# Patient Record
Sex: MALE | Race: WHITE | NOT HISPANIC OR LATINO | Employment: OTHER | URBAN - METROPOLITAN AREA
[De-identification: names, ages, dates, MRNs, and addresses within clinical notes are randomized per-mention and may not be internally consistent; named-entity substitution may affect disease eponyms.]

---

## 2017-01-17 ENCOUNTER — PROCEDURE VISIT (OUTPATIENT)
Dept: OPHTHALMOLOGY | Facility: CLINIC | Age: 77
End: 2017-01-17
Attending: OPHTHALMOLOGY
Payer: MEDICARE

## 2017-01-17 DIAGNOSIS — H35.3112 NONEXUDATIVE AGE-RELATED MACULAR DEGENERATION, RIGHT EYE, INTERMEDIATE DRY STAGE: ICD-10-CM

## 2017-01-17 DIAGNOSIS — H35.3221 EXUDATIVE AGE-RELATED MACULAR DEGENERATION OF LEFT EYE WITH ACTIVE CHOROIDAL NEOVASCULARIZATION: Primary | ICD-10-CM

## 2017-01-17 DIAGNOSIS — H25.13 NS (NUCLEAR SCLEROSIS), BILATERAL: ICD-10-CM

## 2017-01-17 PROCEDURE — 67028 INJECTION EYE DRUG: CPT | Mod: LT,S$GLB,, | Performed by: OPHTHALMOLOGY

## 2017-01-17 PROCEDURE — 92014 COMPRE OPH EXAM EST PT 1/>: CPT | Mod: 25,S$GLB,, | Performed by: OPHTHALMOLOGY

## 2017-01-17 PROCEDURE — 92134 CPTRZ OPH DX IMG PST SGM RTA: CPT | Mod: S$GLB,,, | Performed by: OPHTHALMOLOGY

## 2017-01-17 RX ADMIN — Medication 1.25 MG: at 12:01

## 2017-01-17 NOTE — MR AVS SNAPSHOT
[facility] - Ophthalmology  [address]  [facility] [address]  Phone: [phone]  Fax: [phone]                  Giovanni Zavaleta   2017 10:10 AM   Procedure visit    Description:  Male : 1940   Provider:  DEMOND Encinas MD   Department:  [facility] - Ophthalmology           Reason for Visit     2 mons ck           Diagnoses this Visit        Comments    Exudative age-related macular degeneration of left eye with active choroidal neovascularization    -  Primary     Nonexudative age-related macular degeneration, right eye, intermediate dry stage         NS (nuclear sclerosis), bilateral                To Do List           Goals (5 Years of Data)     None      Follow-Up and Disposition     Return in about 4 weeks (around 2017).      [facility]     [facility] On Call Nurse Care Line -  Assistance  Registered nurses in the [facility] On Call Center provide clinical advisement, health education, appointment booking, and other advisory services.  Call for this free service at [phone].             Medications           Message regarding Medications     Verify the changes and/or additions to your medication regime listed below are the same as discussed with your clinician today.  If any of these changes or additions are incorrect, please notify your healthcare provider.        These medications were administered today        Dose Freq    bevacizumab (AVASTIN) 2.5 mg/0.10 mL 1.25 mg 1.25 mg Clinic/HOD 1 time    Sig: Inject 0.05 mLs (1.25 mg total) into the eye one time.    Class: Normal    Route: Intraocular           Verify that the below list of medications is an accurate representation of the medications you are currently taking.  If none reported, the list may be blank. If incorrect, please contact your healthcare provider. Carry this list with you in case of emergency.           Current Medications     albuterol (VENTOLIN HFA) 90 mcg/actuation inhaler Inhale 2  puffs into the lungs every 6 (six) hours as needed for Wheezing.    aspirin 81 MG Chew Take 81 mg by mouth once daily.    atorvastatin (LIPITOR) 20 MG tablet     clopidogrel (PLAVIX) 75 mg tablet     fenofibrate 160 MG Tab     labetalol (NORMODYNE) 200 MG tablet            Clinical Reference Information           Allergies as of 1/17/2017     Pravastatin      Immunizations Administered on Date of Encounter - 1/17/2017     None      Orders Placed During Today's Visit      Normal Orders This Visit    Posterior Segment OCT Retina-Both eyes     Prior Authorization Order     Future Labs/Procedures Expected by Expires    Posterior Segment OCT Retina-Both eyes  As directed 1/17/2018      [facility] Sign-Up     Activating your [facility] account is as easy as 1-2-3!     1) Visit [email or web address], select Sign Up Now, enter this activation code and your date of birth, then select Next.  [email or web address]  Expires: 3/3/2017 11:59 AM      2) Create a username and password to use when you visit [facility] in the future and select a security question in case you lose your password and select Next.    3) Enter your e-mail address and click Sign Up!    Additional Information  If you have questions, please e-mail [email or web address] or call [phone] to talk to our [facility] staff. Remember, [facility] is NOT to be used for urgent needs. For medical emergencies, dial 911.

## 2017-01-17 NOTE — PATIENT INSTRUCTIONS

## 2017-01-17 NOTE — PROGRESS NOTES
OCT - OD - subfoveal drusen  OS - parafoveal PED with SRF improved      A/P    1. Wet AMD OS    Avastin OS # 2 today    2. Dry AMD OD  AREDS/AG    3. Early NS OU      1 month OCT    Risks, benefits, and alternatives to treatment discussed in detail with the patient.  The patient voiced understanding and wished to proceed with the procedure    Injection Procedure Note:  Diagnosis: Wet AMD OS    Topical Proparacaine and Betadine.  Inject Avastin OS at 6:00 @ 3.5-4mm posterior to limbus  Post Operative Dx: Same  Complications: None  Follow up as above.

## 2017-02-21 ENCOUNTER — PROCEDURE VISIT (OUTPATIENT)
Dept: OPHTHALMOLOGY | Facility: CLINIC | Age: 77
End: 2017-02-21
Payer: MEDICARE

## 2017-02-21 VITALS — HEART RATE: 67 BPM | DIASTOLIC BLOOD PRESSURE: 65 MMHG | SYSTOLIC BLOOD PRESSURE: 108 MMHG

## 2017-02-21 DIAGNOSIS — H35.3112 NONEXUDATIVE AGE-RELATED MACULAR DEGENERATION, RIGHT EYE, INTERMEDIATE DRY STAGE: ICD-10-CM

## 2017-02-21 DIAGNOSIS — H35.3221 EXUDATIVE AGE-RELATED MACULAR DEGENERATION OF LEFT EYE WITH ACTIVE CHOROIDAL NEOVASCULARIZATION: Primary | ICD-10-CM

## 2017-02-21 PROCEDURE — 67028 INJECTION EYE DRUG: CPT | Mod: LT,S$GLB,, | Performed by: OPHTHALMOLOGY

## 2017-02-21 PROCEDURE — 92014 COMPRE OPH EXAM EST PT 1/>: CPT | Mod: 25,S$GLB,, | Performed by: OPHTHALMOLOGY

## 2017-02-21 PROCEDURE — 92134 CPTRZ OPH DX IMG PST SGM RTA: CPT | Mod: S$GLB,,, | Performed by: OPHTHALMOLOGY

## 2017-02-21 RX ADMIN — Medication 1.25 MG: at 12:02

## 2017-02-21 NOTE — PROGRESS NOTES
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 Wet AMD OS    Avastin OS # 3 today      2. Dry AMD OD  AREDS/AG    3. Early NS OU      RTC 4 weeks OCT    Risks, benefits, and alternatives to treatment discussed in detail with the patient.  The patient voiced understanding and wished to proceed with the procedure    Injection Procedure Note:  Diagnosis: Wet AMD OS    Topical Proparacaine and Betadine.  Inject Avastin OS at 6:00 @ 3.5-4mm posterior to limbus  Post Operative Dx: Same  Complications: None  Follow up as above.

## 2017-02-21 NOTE — PATIENT INSTRUCTIONS

## 2017-02-21 NOTE — MR AVS SNAPSHOT
[facility] - Ophthalmology  [address]  [facility] [address]  Phone: [phone]  Fax: [phone]                  Giovanni Zavaleta   2017 10:50 AM   Procedure visit    Description:  Male : 1940   Provider:  DEMOND Encinas MD   Department:  [facility] - Ophthalmology           Reason for Visit     Eye Problem           Diagnoses this Visit        Comments    Exudative age-related macular degeneration of left eye with active choroidal neovascularization    -  Primary     Nonexudative age-related macular degeneration, right eye, intermediate dry stage                To Do List           Goals (5 Years of Data)     None      Follow-Up and Disposition     Return in about 4 weeks (around 3/21/2017).      [facility]     [facility] On Call Nurse Care Line -  Assistance  Registered nurses in the [facility] On Call Center provide clinical advisement, health education, appointment booking, and other advisory services.  Call for this free service at [phone].             Medications           Message regarding Medications     Verify the changes and/or additions to your medication regime listed below are the same as discussed with your clinician today.  If any of these changes or additions are incorrect, please notify your healthcare provider.        These medications were administered today        Dose Freq    bevacizumab (AVASTIN) 2.5 mg/0.10 mL 1.25 mg 1.25 mg Clinic/HOD 1 time    Sig: Inject 0.05 mLs (1.25 mg total) into the eye one time.    Class: Normal    Route: Intraocular           Verify that the below list of medications is an accurate representation of the medications you are currently taking.  If none reported, the list may be blank. If incorrect, please contact your healthcare provider. Carry this list with you in case of emergency.           Current Medications     albuterol (VENTOLIN HFA) 90 mcg/actuation inhaler Inhale 2 puffs into the lungs every 6 (six) hours  as needed for Wheezing.    aspirin 81 MG Chew Take 81 mg by mouth once daily.    atorvastatin (LIPITOR) 20 MG tablet     clopidogrel (PLAVIX) 75 mg tablet     fenofibrate 160 MG Tab     labetalol (NORMODYNE) 200 MG tablet            Clinical Reference Information           Your Vitals Were     BP Pulse                108/65 (BP Location: Left arm, Patient Position: Sitting, BP Method: Automatic) 67          Blood Pressure          Most Recent Value    BP  108/65      Allergies as of 2/21/2017     Pravastatin      Immunizations Administered on Date of Encounter - 2/21/2017     None      Orders Placed During Today's Visit      Normal Orders This Visit    Posterior Segment OCT Retina-Both eyes     Prior Authorization Order     Future Labs/Procedures Expected by Expires    Posterior Segment OCT Retina-Both eyes  As directed 2/21/2018      [facility] Sign-Up     Activating your [facility] account is as easy as 1-2-3!     1) Visit [email or web address], select Sign Up Now, enter this activation code and your date of birth, then select Next.  [email or web address]  Expires: 3/3/2017 11:59 AM      2) Create a username and password to use when you visit [facility] in the future and select a security question in case you lose your password and select Next.    3) Enter your e-mail address and click Sign Up!    Additional Information  If you have questions, please e-mail [email or web address] or call [phone] to talk to our [facility] staff. Remember, [facility] is NOT to be used for urgent needs. For medical emergencies, dial 911.         Language Assistance Services     ATTENTION: Language assistance services are available, free of charge. Please call [phone].      ATENCIÓN: Si habla español, tiene a machuca disposición servicios gratuitos de asistencia lingüística. Llame al [phone].     RAFITA Ý: N?u b?n nói Ti?ng Vi?t, có các d?ch v? h? tr? ngôn ng? mi?n phí dành cho b?n. G?i s? [phone].         [facility]  complies with applicable Federal civil rights laws and does not discriminate on the basis of race, color, national origin, age, disability, or sex.

## 2017-03-28 ENCOUNTER — PROCEDURE VISIT (OUTPATIENT)
Dept: OPHTHALMOLOGY | Facility: CLINIC | Age: 77
End: 2017-03-28
Attending: OPHTHALMOLOGY
Payer: MEDICARE

## 2017-03-28 VITALS — SYSTOLIC BLOOD PRESSURE: 124 MMHG | HEART RATE: 74 BPM | DIASTOLIC BLOOD PRESSURE: 59 MMHG

## 2017-03-28 DIAGNOSIS — H35.3221 EXUDATIVE AGE-RELATED MACULAR DEGENERATION OF LEFT EYE WITH ACTIVE CHOROIDAL NEOVASCULARIZATION: Primary | ICD-10-CM

## 2017-03-28 DIAGNOSIS — H35.3112 NONEXUDATIVE AGE-RELATED MACULAR DEGENERATION, RIGHT EYE, INTERMEDIATE DRY STAGE: ICD-10-CM

## 2017-03-28 PROCEDURE — 92134 CPTRZ OPH DX IMG PST SGM RTA: CPT | Mod: S$GLB,,, | Performed by: OPHTHALMOLOGY

## 2017-03-28 PROCEDURE — 92014 COMPRE OPH EXAM EST PT 1/>: CPT | Mod: 25,S$GLB,, | Performed by: OPHTHALMOLOGY

## 2017-03-28 PROCEDURE — 67028 INJECTION EYE DRUG: CPT | Mod: LT,S$GLB,, | Performed by: OPHTHALMOLOGY

## 2017-03-28 RX ADMIN — Medication 1.25 MG: at 12:03

## 2017-03-28 NOTE — PROGRESS NOTES
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 Wet AMD OS    Avastin OS # 4 today    Start extension      2. Dry AMD OD  AREDS/AG    3. Early NS OU      RTC 6 weeks OCT    Risks, benefits, and alternatives to treatment discussed in detail with the patient.  The patient voiced understanding and wished to proceed with the procedure    Injection Procedure Note:  Diagnosis: Wet AMD OS    Topical Proparacaine and Betadine.  Inject Avastin OS at 6:00 @ 3.5-4mm posterior to limbus  Post Operative Dx: Same  Complications: None  Follow up as above.

## 2017-03-28 NOTE — PATIENT INSTRUCTIONS

## 2017-03-28 NOTE — MR AVS SNAPSHOT
[facility] - Ophthalmology  [address]  [facility] [address]  Phone: [phone]  Fax: [phone]                  Giovanni Zavaleta   3/28/2017 11:00 AM   Procedure visit    Description:  Male : 1940   Provider:  DEMOND Encinas MD   Department:  [facility] - Ophthalmology           Reason for Visit     Macular Degeneration           Diagnoses this Visit        Comments    Exudative age-related macular degeneration of left eye with active choroidal neovascularization    -  Primary     Nonexudative age-related macular degeneration, right eye, intermediate dry stage                To Do List           Goals (5 Years of Data)     None      Follow-Up and Disposition     Return in about 6 weeks (around 2017).      [facility]     [facility] On Call Nurse Care Line -  Assistance  Registered nurses in the [facility] On Call Center provide clinical advisement, health education, appointment booking, and other advisory services.  Call for this free service at [phone].             Medications           Message regarding Medications     Verify the changes and/or additions to your medication regime listed below are the same as discussed with your clinician today.  If any of these changes or additions are incorrect, please notify your healthcare provider.        These medications were administered today        Dose Freq    bevacizumab (AVASTIN) 2.5 mg/0.10 mL 1.25 mg 1.25 mg Clinic/HOD 1 time    Sig: Inject 0.05 mLs (1.25 mg total) into the eye one time.    Class: Normal    Route: Intraocular           Verify that the below list of medications is an accurate representation of the medications you are currently taking.  If none reported, the list may be blank. If incorrect, please contact your healthcare provider. Carry this list with you in case of emergency.           Current Medications     albuterol (VENTOLIN HFA) 90 mcg/actuation inhaler Inhale 2 puffs into the lungs every 6 (six)  hours as needed for Wheezing.    aspirin 81 MG Chew Take 81 mg by mouth once daily.    atorvastatin (LIPITOR) 20 MG tablet     clopidogrel (PLAVIX) 75 mg tablet     fenofibrate 160 MG Tab     labetalol (NORMODYNE) 200 MG tablet            Clinical Reference Information           Your Vitals Were     BP Pulse                124/59 (BP Location: Left arm, Patient Position: Sitting, BP Method: Automatic) 74          Blood Pressure          Most Recent Value    BP  (!)  124/59      Allergies as of 3/28/2017     Pravastatin      Immunizations Administered on Date of Encounter - 3/28/2017     None      Orders Placed During Today's Visit      Normal Orders This Visit    Posterior Segment OCT Retina-Both eyes     Prior Authorization Order     Future Labs/Procedures Expected by Expires    Posterior Segment OCT Retina-Both eyes  As directed 3/28/2018      [facility] Sign-Up     Activating your [facility] account is as easy as 1-2-3!     1) Visit [email or web address], select Sign Up Now, enter this activation code and your date of birth, then select Next.  [email or web address]  Expires: 5/12/2017 12:07 PM      2) Create a username and password to use when you visit [facility] in the future and select a security question in case you lose your password and select Next.    3) Enter your e-mail address and click Sign Up!    Additional Information  If you have questions, please e-mail [email or web address] or call [phone] to talk to our [facility] staff. Remember, [facility] is NOT to be used for urgent needs. For medical emergencies, dial 911.         Language Assistance Services     ATTENTION: Language assistance services are available, free of charge. Please call [phone].      ATENCIÓN: Si habla español, tiene a machuca disposición servicios gratuitos de asistencia lingüística. Llame al [phone].     RAFITA Ý: N?u b?n nói Ti?ng Vi?t, có các d?ch v? h? tr? ngôn ng? mi?n phí dành cho b?n. G?i s? [phone].         [facility] complies with applicable Federal civil rights laws and does not discriminate on the basis of race, color, national origin, age, disability, or sex.

## 2017-05-09 ENCOUNTER — PROCEDURE VISIT (OUTPATIENT)
Dept: OPHTHALMOLOGY | Facility: CLINIC | Age: 77
End: 2017-05-09
Payer: MEDICARE

## 2017-05-09 VITALS — DIASTOLIC BLOOD PRESSURE: 72 MMHG | HEART RATE: 62 BPM | SYSTOLIC BLOOD PRESSURE: 132 MMHG

## 2017-05-09 DIAGNOSIS — H35.3221 EXUDATIVE AGE-RELATED MACULAR DEGENERATION OF LEFT EYE WITH ACTIVE CHOROIDAL NEOVASCULARIZATION: Primary | ICD-10-CM

## 2017-05-09 DIAGNOSIS — H35.3112 NONEXUDATIVE AGE-RELATED MACULAR DEGENERATION, RIGHT EYE, INTERMEDIATE DRY STAGE: ICD-10-CM

## 2017-05-09 PROCEDURE — 67028 INJECTION EYE DRUG: CPT | Mod: LT,S$GLB,, | Performed by: OPHTHALMOLOGY

## 2017-05-09 PROCEDURE — 92014 COMPRE OPH EXAM EST PT 1/>: CPT | Mod: 25,S$GLB,, | Performed by: OPHTHALMOLOGY

## 2017-05-09 RX ADMIN — Medication 1.25 MG: at 11:05

## 2017-05-09 NOTE — MR AVS SNAPSHOT
[facility] - Ophthalmology  [address]  [facility] [address]  Phone: [phone]  Fax: [phone]                  Giovanni Zavaleta   2017 10:20 AM   Procedure visit    Description:  Male : 1940   Provider:  DEMOND Encinas MD   Department:  [facility] - Ophthalmology           Reason for Visit     Macular Degeneration           Diagnoses this Visit        Comments    Exudative age-related macular degeneration of left eye with active choroidal neovascularization    -  Primary     Nonexudative age-related macular degeneration, right eye, intermediate dry stage                To Do List           Goals (5 Years of Data)     None      Follow-Up and Disposition     Return in about 5 weeks (around 2017).      [facility] On Call     [facility] Nurse Care Line -  Assistance  Unless otherwise directed by your provider, please contact [facility], our nurse care line that is available for  assistance.     Registered nurses in the [facility] On Call Center provide: appointment scheduling, clinical advisement, health education, and other advisory services.  Call: [phone] (toll free)               Medications           Message regarding Medications     Verify the changes and/or additions to your medication regime listed below are the same as discussed with your clinician today.  If any of these changes or additions are incorrect, please notify your healthcare provider.        These medications were administered today        Dose Freq    bevacizumab (AVASTIN) 2.5 mg/0.10 mL 1.25 mg 1.25 mg Clinic/HOD 1 time    Sig: Inject 0.05 mLs (1.25 mg total) into the eye one time.    Class: Normal    Route: Intraocular           Verify that the below list of medications is an accurate representation of the medications you are currently taking.  If none reported, the list may be blank. If incorrect, please contact your healthcare provider. Carry this list with you in case of  emergency.           Current Medications     albuterol (VENTOLIN HFA) 90 mcg/actuation inhaler Inhale 2 puffs into the lungs every 6 (six) hours as needed for Wheezing.    aspirin 81 MG Chew Take 81 mg by mouth once daily.    atorvastatin (LIPITOR) 20 MG tablet     clopidogrel (PLAVIX) 75 mg tablet     fenofibrate 160 MG Tab     labetalol (NORMODYNE) 200 MG tablet            Clinical Reference Information           Your Vitals Were     BP Pulse                132/72 (BP Location: Left arm, Patient Position: Sitting, BP Method: Automatic) 62          Blood Pressure          Most Recent Value    BP  132/72      Allergies as of 5/9/2017     Pravastatin      Immunizations Administered on Date of Encounter - 5/9/2017     None      Orders Placed During Today's Visit      Normal Orders This Visit    Prior Authorization Order     Future Labs/Procedures Expected by Expires    Posterior Segment OCT Retina-Both eyes  As directed 5/9/2018      [facility] Sign-Up     Activating your [facility] account is as easy as 1-2-3!     1) Visit [email or web address], select Sign Up Now, enter this activation code and your date of birth, then select Next.  [email or web address]  Expires: 5/12/2017 12:07 PM      2) Create a username and password to use when you visit [facility] in the future and select a security question in case you lose your password and select Next.    3) Enter your e-mail address and click Sign Up!    Additional Information  If you have questions, please e-mail [email or web address] or call [phone] to talk to our [facility] staff. Remember, [facility] is NOT to be used for urgent needs. For medical emergencies, dial 911.         Language Assistance Services     ATTENTION: Language assistance services are available, free of charge. Please call [phone].      ATENCIÓN: Si habla español, tiene a machuca disposición servicios gratuitos de asistencia lingüística. Llame al [phone].     CHÚ Ý: N?u b?n nói Ti?ng Vi?t, có các d?ch  v? h? tr? ngôn ng? mi?n phí dành cho b?n. G?i s? [phone].         [facility] complies with applicable Federal civil rights laws and does not discriminate on the basis of race, color, national origin, age, disability, or sex.

## 2017-05-09 NOTE — PATIENT INSTRUCTIONS
POST INTRAVITREAL INJECTION PATIENT INSTRUCTIONS   Below are some guidelines for what to expect after your treatment and additional care instructions.   * you may experience mild discomfort in your eye after the treatment. Your eye usually feels better within 24 to 48 hours after the procedure.   * you have been given drops that numb the surface of your eye.   DO NOT rub or touch your eye, DO NOT wear contacts until numbness goes away.   * you may experience small spots that appear in your field of vision, these are usually caused by an air bubble or from the medication. It taked a few hours or days for these to go away.   * use of sunglasses will help reduce light sensitivity and glare.   * DO NOT swim or put sink water ( tap water ) or swin for at least 24 hours after the injection   * If you have a gritty, burning, irritating or stinging feeling in the injected eye. This may be a result of the antiseptic used. Use artifical tears or eye lubricant ( from over- the- counter from your local pharmacy ). If you have some at home already please check the expiration date, so not to use anything contaminated in your eye. A cool pack over your eye brow above the injected eye may also relieve discomfort.   Call us right away or go to the Emergency Department if you have a dramatic decrease in vision or extreme pain in the eye.   [facility] [phone]

## 2017-06-13 ENCOUNTER — PROCEDURE VISIT (OUTPATIENT)
Dept: OPHTHALMOLOGY | Facility: CLINIC | Age: 77
End: 2017-06-13
Payer: MEDICARE

## 2017-06-13 VITALS — DIASTOLIC BLOOD PRESSURE: 73 MMHG | HEART RATE: 64 BPM | SYSTOLIC BLOOD PRESSURE: 128 MMHG

## 2017-06-13 DIAGNOSIS — H35.3221 EXUDATIVE AGE-RELATED MACULAR DEGENERATION OF LEFT EYE WITH ACTIVE CHOROIDAL NEOVASCULARIZATION: Primary | ICD-10-CM

## 2017-06-13 DIAGNOSIS — H35.3112 NONEXUDATIVE AGE-RELATED MACULAR DEGENERATION, RIGHT EYE, INTERMEDIATE DRY STAGE: ICD-10-CM

## 2017-06-13 DIAGNOSIS — H25.12 NS (NUCLEAR SCLEROSIS), LEFT: ICD-10-CM

## 2017-06-13 PROCEDURE — 92014 COMPRE OPH EXAM EST PT 1/>: CPT | Mod: 25,S$GLB,, | Performed by: OPHTHALMOLOGY

## 2017-06-13 PROCEDURE — 67028 INJECTION EYE DRUG: CPT | Mod: LT,S$GLB,, | Performed by: OPHTHALMOLOGY

## 2017-06-13 PROCEDURE — 92134 CPTRZ OPH DX IMG PST SGM RTA: CPT | Mod: S$GLB,,, | Performed by: OPHTHALMOLOGY

## 2017-06-13 RX ADMIN — Medication 1.25 MG: at 11:06

## 2017-06-13 NOTE — PROGRESS NOTES
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 Wet AMD OS  -Avastin OS # 5 3/28/17  5/17 - slight increase at 6 weeks    Avastin #6 OS today     2. Dry AMD OD  AREDS/AG    3. Early NS OU    RTC 5 weeks OCT    Risks, benefits, and alternatives to treatment discussed in detail with the patient.  The patient voiced understanding and wished to proceed with the procedure    Injection Procedure Note:  Diagnosis: Wet AMD OS    Topical Proparacaine and Betadine.  Inject Avastin OS at 6:00 @ 3.5-4mm posterior to limbus  Post Operative Dx: Same  Complications: None  Follow up as above.

## 2017-06-13 NOTE — PATIENT INSTRUCTIONS

## 2017-07-21 ENCOUNTER — PROCEDURE VISIT (OUTPATIENT)
Dept: OPHTHALMOLOGY | Facility: CLINIC | Age: 77
End: 2017-07-21
Payer: MEDICARE

## 2017-07-21 VITALS — SYSTOLIC BLOOD PRESSURE: 111 MMHG | HEART RATE: 67 BPM | DIASTOLIC BLOOD PRESSURE: 62 MMHG

## 2017-07-21 DIAGNOSIS — H35.3221 EXUDATIVE AGE-RELATED MACULAR DEGENERATION OF LEFT EYE WITH ACTIVE CHOROIDAL NEOVASCULARIZATION: Primary | ICD-10-CM

## 2017-07-21 DIAGNOSIS — H35.3112 NONEXUDATIVE AGE-RELATED MACULAR DEGENERATION, RIGHT EYE, INTERMEDIATE DRY STAGE: ICD-10-CM

## 2017-07-21 PROCEDURE — 92014 COMPRE OPH EXAM EST PT 1/>: CPT | Mod: 25,S$GLB,, | Performed by: OPHTHALMOLOGY

## 2017-07-21 PROCEDURE — 92134 CPTRZ OPH DX IMG PST SGM RTA: CPT | Mod: S$GLB,,, | Performed by: OPHTHALMOLOGY

## 2017-07-21 PROCEDURE — 67028 INJECTION EYE DRUG: CPT | Mod: LT,S$GLB,, | Performed by: OPHTHALMOLOGY

## 2017-07-21 RX ADMIN — Medication 1.25 MG: at 12:07

## 2017-07-21 NOTE — PROGRESS NOTES
HPI     5wk // OCT// Avastin     Pt sts no change since last visit. 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 Wet AMD OS  -Avastin OS # 6  5/17 - slight increase at 6 weeks  7/17 - stable - resume slow extension    Avastin #7 OS today     2. Dry AMD OD  AREDS/AG    3. Early NS OU    RTC 6-7 weeks OCT    Risks, benefits, and alternatives to treatment discussed in detail with the patient.  The patient voiced understanding and wished to proceed with the procedure    Injection Procedure Note:  Diagnosis: Wet AMD OS    Topical Proparacaine and Betadine.  Inject Avastin OS at 6:00 @ 3.5-4mm posterior to limbus  Post Operative Dx: Same  Complications: None  Follow up as above.

## 2017-07-21 NOTE — PATIENT INSTRUCTIONS
POST INTRAVITREAL INJECTION PATIENT INSTRUCTIONS   Below are some guidelines for what to expect after your treatment and additional care instructions.    You may experience mild discomfort in your eye after the treatment. Usually your eye feels better within 24 to 48 hours after the procedure.      You have been given drops that numb the surface of your eye. DO NOT rub or touch your eye. DO NOT wear contacts until numbness goes away.      You may experience small spots that appear in your field of vision. These are usually caused by an air bubble or from the medication. It takes a few hours or days for these to go away.      The use of sunglasses will help reduce light sensitivity and glare.      DO NOT swim or put sink water (tap water) in your eyes for at least 4 hours after the injection.      You may get a gritty, burning, irritating or stinging feeling in the injected eye as a result of the antiseptic used. Use artificial tears or eye lubricant to reduce the sensation. These are available over-the-counter from your local pharmacy. If you already have some at home, be sure to check the expiration date and to avoid contaminating your eye. A cool pack over your eye brow above the injected eye may also relieve discomfort.     Call us right away or go to the Emergency Department if you have a dramatic decrease in vision or extreme pain in the eye.   [facility] [phone]   Item: [id number]   Revised: 09/2015

## 2017-09-04 ENCOUNTER — HOSPITAL ENCOUNTER (EMERGENCY)
Facility: HOSPITAL | Age: 77
Discharge: HOME | End: 2017-09-05
Attending: EMERGENCY MEDICINE
Payer: MEDICARE

## 2017-09-04 DIAGNOSIS — T67.3XXA HEAT EXHAUSTION DUE TO WATER DEPLETION, INITIAL ENCOUNTER: Primary | ICD-10-CM

## 2017-09-04 DIAGNOSIS — E83.39 HYPOPHOSPHATEMIA: ICD-10-CM

## 2017-09-04 LAB
ALBUMIN SERPL BCP-MCNC: 3.7 G/DL
ALP SERPL-CCNC: 59 U/L
ALT SERPL W/O P-5'-P-CCNC: 21 U/L
ANION GAP SERPL CALC-SCNC: 10 MMOL/L
AST SERPL-CCNC: 13 U/L
BASOPHILS # BLD AUTO: 0 K/UL
BASOPHILS NFR BLD: 0 %
BILIRUB SERPL-MCNC: 0.6 MG/DL
BUN SERPL-MCNC: 24 MG/DL
CALCIUM SERPL-MCNC: 9.3 MG/DL
CHLORIDE SERPL-SCNC: 109 MMOL/L
CK SERPL-CCNC: 54 U/L
CO2 SERPL-SCNC: 22 MMOL/L
CREAT SERPL-MCNC: 1.3 MG/DL
DIFFERENTIAL METHOD: ABNORMAL
EOSINOPHIL # BLD AUTO: 0.1 K/UL
EOSINOPHIL NFR BLD: 0.6 %
ERYTHROCYTE [DISTWIDTH] IN BLOOD BY AUTOMATED COUNT: 13.8 %
EST. GFR  (AFRICAN AMERICAN): >60 ML/MIN/1.73 M^2
EST. GFR  (NON AFRICAN AMERICAN): 53 ML/MIN/1.73 M^2
GLUCOSE SERPL-MCNC: 127 MG/DL
HCT VFR BLD AUTO: 44.2 %
HGB BLD-MCNC: 14.8 G/DL
LYMPHOCYTES # BLD AUTO: 1 K/UL
LYMPHOCYTES NFR BLD: 5.8 %
MAGNESIUM SERPL-MCNC: 1.7 MG/DL
MCH RBC QN AUTO: 26.7 PG
MCHC RBC AUTO-ENTMCNC: 33.5 G/DL
MCV RBC AUTO: 80 FL
MONOCYTES # BLD AUTO: 1 K/UL
MONOCYTES NFR BLD: 6 %
NEUTROPHILS # BLD AUTO: 14.6 K/UL
NEUTROPHILS NFR BLD: 87.6 %
PHOSPHATE SERPL-MCNC: 1.7 MG/DL
PLATELET # BLD AUTO: 211 K/UL
PMV BLD AUTO: 7.8 FL
POTASSIUM SERPL-SCNC: 4.1 MMOL/L
PROT SERPL-MCNC: 6.4 G/DL
RBC # BLD AUTO: 5.53 M/UL
SODIUM SERPL-SCNC: 141 MMOL/L
TROPONIN I SERPL DL<=0.01 NG/ML-MCNC: <0.006 NG/ML
WBC # BLD AUTO: 16.7 K/UL

## 2017-09-04 PROCEDURE — 81003 URINALYSIS AUTO W/O SCOPE: CPT

## 2017-09-04 PROCEDURE — 99284 EMERGENCY DEPT VISIT MOD MDM: CPT | Mod: 25

## 2017-09-04 PROCEDURE — 83735 ASSAY OF MAGNESIUM: CPT

## 2017-09-04 PROCEDURE — 80053 COMPREHEN METABOLIC PANEL: CPT

## 2017-09-04 PROCEDURE — 82550 ASSAY OF CK (CPK): CPT

## 2017-09-04 PROCEDURE — 96360 HYDRATION IV INFUSION INIT: CPT

## 2017-09-04 PROCEDURE — 84100 ASSAY OF PHOSPHORUS: CPT

## 2017-09-04 PROCEDURE — 84484 ASSAY OF TROPONIN QUANT: CPT

## 2017-09-04 PROCEDURE — 36415 COLL VENOUS BLD VENIPUNCTURE: CPT

## 2017-09-04 PROCEDURE — 85025 COMPLETE CBC W/AUTO DIFF WBC: CPT

## 2017-09-04 PROCEDURE — 84443 ASSAY THYROID STIM HORMONE: CPT

## 2017-09-05 ENCOUNTER — PROCEDURE VISIT (OUTPATIENT)
Dept: OPHTHALMOLOGY | Facility: CLINIC | Age: 77
End: 2017-09-05
Payer: MEDICARE

## 2017-09-05 VITALS
TEMPERATURE: 98 F | DIASTOLIC BLOOD PRESSURE: 84 MMHG | RESPIRATION RATE: 20 BRPM | OXYGEN SATURATION: 98 % | HEART RATE: 74 BPM | SYSTOLIC BLOOD PRESSURE: 188 MMHG

## 2017-09-05 VITALS — SYSTOLIC BLOOD PRESSURE: 138 MMHG | HEART RATE: 73 BPM | DIASTOLIC BLOOD PRESSURE: 73 MMHG

## 2017-09-05 DIAGNOSIS — H35.3221 EXUDATIVE AGE-RELATED MACULAR DEGENERATION OF LEFT EYE WITH ACTIVE CHOROIDAL NEOVASCULARIZATION: Primary | ICD-10-CM

## 2017-09-05 DIAGNOSIS — H35.3112 NONEXUDATIVE AGE-RELATED MACULAR DEGENERATION, RIGHT EYE, INTERMEDIATE DRY STAGE: ICD-10-CM

## 2017-09-05 DIAGNOSIS — H25.13 NS (NUCLEAR SCLEROSIS), BILATERAL: ICD-10-CM

## 2017-09-05 LAB
BILIRUB UR QL STRIP: NEGATIVE
CLARITY UR: CLEAR
COLOR UR: YELLOW
GLUCOSE UR QL STRIP: NEGATIVE
HGB UR QL STRIP: ABNORMAL
KETONES UR QL STRIP: NEGATIVE
LEUKOCYTE ESTERASE UR QL STRIP: NEGATIVE
NITRITE UR QL STRIP: NEGATIVE
PH UR STRIP: 6 [PH] (ref 5–8)
PROT UR QL STRIP: NEGATIVE
SP GR UR STRIP: 1.01 (ref 1–1.03)
TSH SERPL DL<=0.005 MIU/L-ACNC: 2.15 UIU/ML
URN SPEC COLLECT METH UR: ABNORMAL
UROBILINOGEN UR STRIP-ACNC: 1 EU/DL

## 2017-09-05 PROCEDURE — 67028 INJECTION EYE DRUG: CPT | Mod: LT,S$GLB,, | Performed by: OPHTHALMOLOGY

## 2017-09-05 PROCEDURE — 92014 COMPRE OPH EXAM EST PT 1/>: CPT | Mod: 25,S$GLB,, | Performed by: OPHTHALMOLOGY

## 2017-09-05 PROCEDURE — 92134 CPTRZ OPH DX IMG PST SGM RTA: CPT | Mod: S$GLB,,, | Performed by: OPHTHALMOLOGY

## 2017-09-05 PROCEDURE — 25000003 PHARM REV CODE 250: Performed by: EMERGENCY MEDICINE

## 2017-09-05 RX ORDER — SODIUM,POTASSIUM PHOSPHATES 280-250MG
1 POWDER IN PACKET (EA) ORAL
Qty: 100 PACKET | Refills: 0 | Status: SHIPPED | OUTPATIENT
Start: 2017-09-05 | End: 2017-12-19

## 2017-09-05 RX ORDER — SODIUM,POTASSIUM PHOSPHATES 280-250MG
2 POWDER IN PACKET (EA) ORAL ONCE
Status: COMPLETED | OUTPATIENT
Start: 2017-09-05 | End: 2017-09-05

## 2017-09-05 RX ADMIN — POTASSIUM & SODIUM PHOSPHATES POWDER PACK 280-160-250 MG 2 PACKET: 280-160-250 PACK at 12:09

## 2017-09-05 RX ADMIN — SODIUM CHLORIDE 1000 ML: 0.9 INJECTION, SOLUTION INTRAVENOUS at 12:09

## 2017-09-05 RX ADMIN — Medication 1.25 MG: at 12:09

## 2017-09-05 NOTE — ED PROVIDER NOTES
Encounter Date: 9/4/2017    SCRIBE #1 NOTE: I, Marco Antonio Diaz, am scribing for, and in the presence of, Dr. Chavez.       History     Chief Complaint   Patient presents with    Extremity Weakness     States been working in the yard for the past several days.  EMS reports admin fluid bolus of 250ml NACL.  IV right hand 20 gauge.       09/04/2017 10:51 PM     Chief Complaint: Extremity Weakness      Giovanni Zavaleta is a 76 y.o. male with a history of Macular degeneration who presents to the ED via EMS with complaints of extremity weakness associated with HA. Pt reports he drinks lots of water. He notes a near syncopal episodes this afternoon while working in the yard. Pt also notes right shoulder pain since several months ago. He denies focal weakness and CP. Allergens include Pravastatin.       The history is provided by the patient.     Review of patient's allergies indicates:   Allergen Reactions    Pravastatin Rash     Past Medical History:   Diagnosis Date    Macular degeneration      Past Surgical History:   Procedure Laterality Date    COLON SURGERY  2012    10 inches removed     CORONARY ARTERY BYPASS GRAFT  2009,2010,2012    Dr Abhishek Lantigua     TONSILLECTOMY      at 17 y/o      Family History   Problem Relation Age of Onset    Heart disease Father     Hypertension Brother      Social History   Substance Use Topics    Smoking status: Former Smoker     Types: Cigarettes     Start date: 1/1/1960     Quit date: 8/1/2008    Smokeless tobacco: Never Used    Alcohol use Not on file     Review of Systems   Constitutional: Negative for fever.   HENT: Negative for sore throat.    Eyes: Negative for redness.   Respiratory: Negative for shortness of breath.    Cardiovascular: Negative for chest pain.   Gastrointestinal: Negative for nausea.   Genitourinary: Negative for dysuria.   Musculoskeletal: Negative for back pain.   Skin: Negative for rash.   Neurological: Positive for weakness and headaches.    Hematological: Does not bruise/bleed easily.       Physical Exam     Initial Vitals   BP Pulse Resp Temp SpO2   -- -- -- -- --      MAP       --         Physical Exam    Nursing note and vitals reviewed.  Constitutional: He appears well-developed and well-nourished.  Non-toxic appearance. No distress.   HENT:   Head: Normocephalic and atraumatic.   Eyes: EOM are normal. Pupils are equal, round, and reactive to light.   Neck: Normal range of motion. Neck supple. No neck rigidity. No JVD present.   Cardiovascular: Normal rate, regular rhythm, normal heart sounds and intact distal pulses. Exam reveals no gallop and no friction rub.    No murmur heard.  Pulmonary/Chest: Breath sounds normal. No respiratory distress. He has no wheezes. He has no rhonchi. He has no rales.   Abdominal: Soft. Bowel sounds are normal. He exhibits no distension. There is no tenderness. There is no rigidity, no rebound and no guarding.   Musculoskeletal: Normal range of motion.   Neurological: He is alert and oriented to person, place, and time. He has normal strength and normal reflexes. No cranial nerve deficit or sensory deficit. He exhibits normal muscle tone. Coordination normal. GCS eye subscore is 4. GCS verbal subscore is 5. GCS motor subscore is 6.   5/5 strength and sensations. Cranial nerves II-XII intact.    Skin: Skin is warm and dry.   Psychiatric: He has a normal mood and affect. His speech is normal and behavior is normal. He is not actively hallucinating.         ED Course   Procedures  Labs Reviewed   CBC W/ AUTO DIFFERENTIAL - Abnormal; Notable for the following:        Result Value    WBC 16.70 (*)     MCV 80 (*)     MCH 26.7 (*)     MPV 7.8 (*)     Gran # 14.6 (*)     Gran% 87.6 (*)     Lymph% 5.8 (*)     All other components within normal limits   COMPREHENSIVE METABOLIC PANEL - Abnormal; Notable for the following:     CO2 22 (*)     Glucose 127 (*)     BUN, Bld 24 (*)     eGFR if non  53 (*)     All  other components within normal limits   URINALYSIS - Abnormal; Notable for the following:     Occult Blood UA Trace (*)     All other components within normal limits   PHOSPHORUS - Abnormal; Notable for the following:     Phosphorus 1.7 (*)     All other components within normal limits   TSH   TROPONIN I   MAGNESIUM   CK     EKG Readings: (Independently Interpreted)   Initial Reading: No STEMI.   Sinus rhythm with first-degree AV block, 87 BPM, left axis deviation, normal ST segments, normal T waves       X-Rays:   Independently Interpreted Readings:   Chest X-Ray: No infiltrates.  No acute abnormalities. Mild left basilar scarring     Medical Decision Making:   History:   Old Medical Records: I decided to obtain old medical records.  Initial Assessment:   76-year-old man with generalized weakness after working in the yard all day for a couple of days.  He is noted to have a low phosphorus level.  No evidence of ischemic cardiac changes.  CT head negative.  Neuro exam normal.  No grave electrolyte abnormalities other than the phosphorus.  When his elevated indicate mild dehydration.  Patient had a large amount hydration prior to coming to the ER and states he is already feeling better.  He received IV fluids with significant improvement.  Ambulated around the ED without ataxia.  Per cardiac workup is negative.  I have very low suspicion for ACS.  It is likely secondary to heat exhaustion and low phosphorus.  He will be given a Phos sup(plementation prescription.  I believe is appropriate for outpatient follow-up.  His discharge improved in no acute distress.  Return precautions were discussed.            Scribe Attestation:   Scribe #1: I performed the above scribed service and the documentation accurately describes the services I performed. I attest to the accuracy of the note.    Attending Attestation:           Physician Attestation for Scribe:  Physician Attestation Statement for Scribe #1: I, Dr. Chavez, reviewed  documentation, as scribed by Marco Antonio Diaz in my presence, and it is both accurate and complete.                 ED Course      Clinical Impression:     1. Heat exhaustion due to water depletion, initial encounter    2. Hypophosphatemia                               Celso Chavez MD  09/05/17 0422

## 2017-09-05 NOTE — PROGRESS NOTES
HPI     DLS 7/.21/17-     Per patient, no noticeable changes x last visit      OCT - OD - subfoveal drusen  OS - parafoveal PED with SRF resolved      A/P    1. Wet AMD OS  -Avastin OS # 7  5/17 - slight increase at 6 weeks  7/17 - stable - resume slow extension  9/17 - Increase at 6 weeks    Avastin #8 OS today     2. Dry AMD OD  AREDS/AG    3. Early NS OU    RTC 4-5 weeks OCT    Risks, benefits, and alternatives to treatment discussed in detail with the patient.  The patient voiced understanding and wished to proceed with the procedure    Injection Procedure Note:  Diagnosis: Wet AMD OS    Topical Proparacaine and Betadine.  Inject Avastin OS at 6:00 @ 3.5-4mm posterior to limbus  Post Operative Dx: Same  Complications: None  Follow up as above.

## 2017-10-10 ENCOUNTER — PROCEDURE VISIT (OUTPATIENT)
Dept: OPHTHALMOLOGY | Facility: CLINIC | Age: 77
End: 2017-10-10
Payer: MEDICARE

## 2017-10-10 VITALS — SYSTOLIC BLOOD PRESSURE: 131 MMHG | HEART RATE: 81 BPM | DIASTOLIC BLOOD PRESSURE: 68 MMHG

## 2017-10-10 DIAGNOSIS — H35.3221 EXUDATIVE AGE-RELATED MACULAR DEGENERATION OF LEFT EYE WITH ACTIVE CHOROIDAL NEOVASCULARIZATION: Primary | ICD-10-CM

## 2017-10-10 DIAGNOSIS — H35.3112 NONEXUDATIVE AGE-RELATED MACULAR DEGENERATION, RIGHT EYE, INTERMEDIATE DRY STAGE: ICD-10-CM

## 2017-10-10 PROCEDURE — 67028 INJECTION EYE DRUG: CPT | Mod: LT,S$GLB,, | Performed by: OPHTHALMOLOGY

## 2017-10-10 PROCEDURE — 92014 COMPRE OPH EXAM EST PT 1/>: CPT | Mod: 25,S$GLB,, | Performed by: OPHTHALMOLOGY

## 2017-10-10 RX ADMIN — Medication 1.25 MG: at 11:10

## 2017-10-10 NOTE — PATIENT INSTRUCTIONS

## 2017-10-10 NOTE — PROGRESS NOTES
HPI     Eye Problem    Additional comments: 4 week check           Comments   DLS 9/5/17- He is still having trouble with vision at night- unchanged HPI     DLS 7/.21/17-     Per patient, no noticeable changes x last visit      OCT - OD - subfoveal drusen  OS - parafoveal PED with SRF resolved      A/P    1. Wet AMD OS  -Avastin OS # 8  5/17 - slight increase at 6 weeks  7/17 - stable - resume slow extension  9/17 - Increase at 6 weeks    Avastin #9 OS today     2. Dry AMD OD  AREDS/AG    3. Early NS OU    RTC 4-5 weeks OCT    Risks, benefits, and alternatives to treatment discussed in detail with the patient.  The patient voiced understanding and wished to proceed with the procedure    Injection Procedure Note:  Diagnosis: Wet AMD OS    Topical Proparacaine and Betadine.  Inject Avastin OS at 6:00 @ 3.5-4mm posterior to limbus  Post Operative Dx: Same  Complications: None  Follow up as above.from previous visits

## 2017-11-14 ENCOUNTER — PROCEDURE VISIT (OUTPATIENT)
Dept: OPHTHALMOLOGY | Facility: CLINIC | Age: 77
End: 2017-11-14
Payer: MEDICARE

## 2017-11-14 DIAGNOSIS — H25.13 NUCLEAR SCLEROSIS OF BOTH EYES: ICD-10-CM

## 2017-11-14 DIAGNOSIS — H35.3112 NONEXUDATIVE AGE-RELATED MACULAR DEGENERATION, RIGHT EYE, INTERMEDIATE DRY STAGE: ICD-10-CM

## 2017-11-14 DIAGNOSIS — H35.3221 EXUDATIVE AGE-RELATED MACULAR DEGENERATION OF LEFT EYE WITH ACTIVE CHOROIDAL NEOVASCULARIZATION: Primary | ICD-10-CM

## 2017-11-14 PROCEDURE — 92134 CPTRZ OPH DX IMG PST SGM RTA: CPT | Mod: S$GLB,,, | Performed by: OPHTHALMOLOGY

## 2017-11-14 PROCEDURE — 67028 INJECTION EYE DRUG: CPT | Mod: LT,S$GLB,, | Performed by: OPHTHALMOLOGY

## 2017-11-14 PROCEDURE — 92014 COMPRE OPH EXAM EST PT 1/>: CPT | Mod: 25,S$GLB,, | Performed by: OPHTHALMOLOGY

## 2017-11-14 RX ORDER — ATORVASTATIN CALCIUM 40 MG/1
TABLET, FILM COATED ORAL
COMMUNITY
Start: 2017-11-13 | End: 2021-01-26 | Stop reason: ALTCHOICE

## 2017-11-14 RX ORDER — CLOTRIMAZOLE AND BETAMETHASONE DIPROPIONATE 10; .64 MG/G; MG/G
CREAM TOPICAL
COMMUNITY
Start: 2017-11-13 | End: 2018-01-23

## 2017-11-14 RX ADMIN — Medication 1.25 MG: at 11:11

## 2017-11-14 NOTE — PROGRESS NOTES
HPI     DLS: 10/10/17     Pt states he has a hard time reading near. Glare is a bother at night. Pt denies floaters and flashes. Pt have been having sinuses.     Eyemeds  Refresh OU PRN     HPI     Eye Problem    Additional comments: 4 week check           Comments   DLS 9/5/17- He is still having trouble with vision at night- unchanged HPI     DLS 7/.21/17-     Per patient, no noticeable changes x last visit      OCT - OD - subfoveal drusen  OS - parafoveal PED with SRF resolved      A/P    1. Wet AMD OS  -Avastin OS # 9  5/17 - slight increase at 6 weeks  7/17 - stable - resume slow extension  9/17 - Increase at 6 weeks    Avastin #10 OS today     2. Dry AMD OD  AREDS/AG    3. Early NS OU    RTC 4-5 weeks OCT    Risks, benefits, and alternatives to treatment discussed in detail with the patient.  The patient voiced understanding and wished to proceed with the procedure    Injection Procedure Note:  Diagnosis: Wet AMD OS    Topical Proparacaine and Betadine.  Inject Avastin OS at 6:00 @ 3.5-4mm posterior to limbus  Post Operative Dx: Same  Complications: None  Follow up as above.from previous visits

## 2017-11-20 NOTE — PROGRESS NOTES
[facility] New Patient/H&P:    Giovanni Zavaleta is a 77 y.o. male referred by Dr Gomez to establish care and evaluate BPH symtpoms.    He was started on Flomax by Dr. Gomez on 10/3/17.  He was last seen 11/3/17 still having nocturia and urinary frequency on FlOmax, and has been referred for evaluation.   PSA 0.91 3/8/17, eGFR 51.  Also noted to have hyperlipidemia, hypertension, CAD, near syncopal event (working in yard, dehydrated), CKD, PVD, DJD. Asa/Plavix  Also noted to be on MiraLAX and stool softener    He reports 8-10 months of worsening urinary frequency and urgency.  DTF 5 times, not bothersome.  NTF at least 3 times, up to 4 times.  He has urgency with all voids and every other day urge incontinence, at which times he may not even be able to make it from the living room to the bathroom.  He drinks 2 cups of coffee per day.  It is only in the last 6-12 months that he has significantly cut down on coffee and was drinking about 10 three-quarter cups per day, prior to which he drank coffee all day long.  After his morning coffee he will either drink iced tea, 2-3 glasses, or flavored water.  He quit smoking 10 years ago.  He denies hematuria or dysuria.  No recurrent urinary tract infections.  At age 20 he had one UTI/kidney infection.  With MiraLAX use he is pretty regular with bowel movements, though has occasional pushing and straining and notes his stools sometimes not soft enough  ++ Spicy foods in his diet and put Crystal hot sauce on almost everything  Urgency as his most bothersome urinary symptom  He reports no difference in his urinary symptoms with Flomax.  No better no worse  He urinated just on arrival to the building at approximately 9:30 and bladder scan postvoid residual at about 10:15 demonstrated only 68 cc residual urine  AUA symptom score: 17/5 (unhappy) - 5: Urgency; 4: Frequency; 3: Nocturia; 2: Intermittency, weak stream; 1: Emptying - medications helped 1 out of 10.       He  does have history of lower extremity bypass with aortic aneurysm repair and aortobifem.  This did then get blocked and needed to be revised ×2.  He is also noted to have bulging disks and spinal stenosis diagnosed 2 years ago by Dr. Hewitt but is not elected to undergo operative management at this time        Past Medical History:   Diagnosis Date    CAD (coronary artery disease)     CKD (chronic kidney disease) stage 3, GFR 30-59 ml/min     Diverticulosis     DJD (degenerative joint disease)     HLD (hyperlipidemia)     Hypertension     Macular degeneration     PVD (peripheral vascular disease)        Past Surgical History:   Procedure Laterality Date    COLON SURGERY  2012    10 inches removed     CORONARY ARTERY BYPASS GRAFT  2009,2010,2012    Dr Abhishek Lantigua     TONSILLECTOMY      at 19 y/o        Family History   Problem Relation Age of Onset    Heart disease Father     Hypertension Brother        Social History     Social History    Marital status:      Spouse name: N/A    Number of children: N/A    Years of education: N/A     Occupational History    Not on file.     Social History Main Topics    Smoking status: Former Smoker     Types: Cigarettes     Start date: 1/1/1960     Quit date: 8/1/2008    Smokeless tobacco: Never Used    Alcohol use Not on file    Drug use: Unknown    Sexual activity: Not on file     Other Topics Concern    Not on file     Social History Narrative    No narrative on file       Review of patient's allergies indicates:   Allergen Reactions    Pravastatin Rash       Medications Reviewed: see MAR    ROS:    Constitutional: denies fevers, chills, night sweats, fatigue, malaise  Respiratory: negative for cough, shortness of breath, wheezing, dyspnea.  Cardiovascular: + for high blood pressure, negative for chest pain, varicose veins, ankle swelling, palpitations, syncope.  GI: negative for abdominal pain, heartburn, indigestion, nausea, vomiting,  "constipation, diarrhea, blood in stool.   Urology: as noted above in HPI  Endocrinology: negative for cold intolerance, excessive thirst, not feeling tired/sluggish, no heat intolerance.   Hematology/Lymph: negative for easy bleeding, easy bruising, swollen glands.  Musculoskeletal: negative for back pain, joint pain, joint swelling, neck pain.  Allergy-Immunology: negative for seasonal allergies, negative for unusual infections.   Skin: negative for boils, breast lumps, hives, itching, rash.   Neurology: negative for, dizziness, headache, tingling/numbness, tremors.   Psych: satisfied with life; negative for, anxiety, depression, suicidal thoughts.     PHYSICAL EXAM:    Vitals:    12/01/17 0918   BP: 109/68   Pulse: 69   Resp: 18     Body mass index is 30.94 kg/m². Weight: 89.6 kg (197 lb 8.5 oz) Height: 5' 7" (170.2 cm)       General: Alert, cooperative, no distress, appears stated age  Head: Normocephalic, without obvious abnormality, atraumatic  Neck: no masses, no thyromegaly, no lymphadenopathy  Eyes: PERRL, conjunctiva/corneas clear  Lungs: Respirations unlabored, normal effort, no accessory muscle use  CV: Warm and well perfused extremities  Abdomen: Soft, non-tender, no CVA tenderness, no hepatosplenomegaly, no hernia  Penis: phallus normal, circumcised, well cared for, no plaques or lesions.   Scrotum: no cysts, no lesions, no rash, no hydrocele, L g2 varicocele.   Epididymes: normal, nontender, symmetrical, no masses or cysts.   Testes: normal, both descended, no masses.   Urethra: palpably normal with orthotopic meatus of normal size    IMANI: normal sphincter tone, no masses, no hemmorrhoids   PROSTATE: 45-50g, no nodules, non-tender, symmetrical.   Extremities: Extremities normal, atraumatic, no cyanosis or edema  Skin: Normal color, texture, and turgor, no rashes or lesions  Psych: Appropriate, well oriented, normal affect, normal mood  Neuro: Non-focal      Assessment/Diagnosis:    1. Urge incontinence " of urine     2. BPH with urinary obstruction  POCT Bladder Scan       Plans:  Discussed conservative measures to control urgency and frequency including avoiding bladder irritants, timed voiding (going by the clock - though also go by need), not postponing voiding, and bowel regimen as distended bowel has extrinsic compressive effect on bladder.   - miralax (1-2 caps per day), colace/stool softener (1-2 caps per day), fiber, prunes  - can use any and all in any combination and change doses up/down as needed to find right regimen  Discussed bladder irritants include coffe (even decaf), tea, alcohol, soda, spicy foods, acidic juices (orange, tomato), vinegar, and artificial sweeteners/sugary beverages.  He should make efforts to limit these bladder irritants, especially in the afternoon and evening hours.  Advised increased water intake.    He does also have an enlarged prostate with obstructive lower urinary tract symptoms.  We did discuss that sometimes in the setting of long-standing obstruction, due to effect on bladder, urgency can be part of the symptom complex, and with relief of prostatic obstruction urgency may improve.  At this time he has had no change on initial medical therapy with alpha blocker, and with prostate greater than 40 g we discussed continuing Flomax, though starting finasteride at this time to shrink prostate (may take 6 weeks to have maximum effect). In addition to meds, needs to be adherent to above recommendations as conservative treatment for his urgency.  We'll hold off on discussions of medical therapy for urgency at this time.    Next visit: 3 months for uroflow/pvr and reassessment of symptoms, to evaluate for obstructive voiding pattern on dual medical therapy.  If symptoms persist with above recommendations:  Cystoscopy (look through urinary channel), and ultrasound to measure prostate size.  Offered these procedures upfront to make more accurate treatment recommendations, as he may  be candidate for minimally invasive treatment of prostatic obstruction if found, such as Urolift or TURP, but he would prefer not to have procedures at this time and start with medical therapy.  Should his urgency persist, may also need future urodynamics    We also discussed that with history of normal PSA and PSA of 0.9 at age 77 with a normal digital rectal exam, that prostate cancer screening and PSA screening can be discontinued.  Brief discussion about the natural history of prostate cancer and recommendations and guidelines for treatment and screening.

## 2017-12-01 ENCOUNTER — OFFICE VISIT (OUTPATIENT)
Dept: UROLOGY | Facility: CLINIC | Age: 77
End: 2017-12-01
Payer: MEDICARE

## 2017-12-01 VITALS
BODY MASS INDEX: 31.01 KG/M2 | HEIGHT: 67 IN | WEIGHT: 197.56 LBS | RESPIRATION RATE: 18 BRPM | DIASTOLIC BLOOD PRESSURE: 68 MMHG | HEART RATE: 69 BPM | SYSTOLIC BLOOD PRESSURE: 109 MMHG

## 2017-12-01 DIAGNOSIS — N13.8 BPH WITH URINARY OBSTRUCTION: ICD-10-CM

## 2017-12-01 DIAGNOSIS — N39.41 URGE INCONTINENCE OF URINE: Primary | ICD-10-CM

## 2017-12-01 DIAGNOSIS — N40.1 BPH WITH URINARY OBSTRUCTION: ICD-10-CM

## 2017-12-01 PROCEDURE — 99204 OFFICE O/P NEW MOD 45 MIN: CPT | Mod: S$GLB,,, | Performed by: UROLOGY

## 2017-12-01 PROCEDURE — 99999 PR PBB SHADOW E&M-EST. PATIENT-LVL III: CPT | Mod: PBBFAC,,, | Performed by: UROLOGY

## 2017-12-01 RX ORDER — TAMSULOSIN HYDROCHLORIDE 0.4 MG/1
0.4 CAPSULE ORAL DAILY
Qty: 30 CAPSULE | Refills: 11 | Status: ON HOLD | OUTPATIENT
Start: 2017-12-01 | End: 2018-05-22

## 2017-12-01 RX ORDER — FINASTERIDE 5 MG/1
5 TABLET, FILM COATED ORAL DAILY
Qty: 30 TABLET | Refills: 11 | Status: SHIPPED | OUTPATIENT
Start: 2017-12-01 | End: 2018-05-14 | Stop reason: SDUPTHER

## 2017-12-01 NOTE — PATIENT INSTRUCTIONS
Discussed conservative measures to control urgency and frequency including avoiding bladder irritants, timed voiding (going by the clock - though also go by need), not postponing voiding, and bowel regimen as distended bowel has extrinsic compressive effect on bladder.   - miralax (1-2 caps per day), colace/stool softener (1-2 caps per day), fiber, prunes  - can use any and all in any combination and change doses up/down as needed to find right regimen    Discussed bladder irritants include coffe (even decaf), tea, alcohol, soda, spicy foods, acidic juices (orange, tomato), vinegar, and artificial sweeteners/sugary beverages.    Continue flomax  Will start finasteride at this time to shrink prostate (may take 6 weeks to have maximum effect)  In addition to meds, do above recommendations    Next visit: 3 months for uroflow (measure flow of urine) so come with full bladder - if cant hold it, come early to drink fluids    If symptoms persist with above recommendations:  Cystoscopy (look through urinary channel), and ultrasound to measure prostate size

## 2017-12-01 NOTE — LETTER
December 3, 2017      Wisam Gomez MD  [address]  [place] [address]           [place] [facility]  [address] [place] [address] [address]  [place] [address]  Phone: [phone]  Fax: [phone]          Patient: Giovanni Zavaleta   MR Number: [id number]   YOB: 1940   Date of Visit: 12/1/2017       Dear Dr. Wisam Gomez:    Thank you for referring Giovanni Zavaleta to me for evaluation. Attached you will find relevant portions of my assessment and plan of care.    If you have questions, please do not hesitate to call me. I look forward to following Giovanni Zavaleta along with you.  As always, please do not hesitate to contact me for the urologic needs of your patients    Sincerely,      Itz Toledo MD    Enclosure  CC:  No Recipients    If you would like to receive this communication electronically, please contact [email or web address] or [phone] to request more information on [facility] Link access.    For providers and/or their staff who would like to refer a patient to [facility], please contact us through our one-stop-shop provider referral line, [facility], at [phone].    If you feel you have received this communication in error or would no longer like to receive these types of communications, please e-mail [email or web address]

## 2017-12-19 ENCOUNTER — PROCEDURE VISIT (OUTPATIENT)
Dept: OPHTHALMOLOGY | Facility: CLINIC | Age: 77
End: 2017-12-19
Payer: MEDICARE

## 2017-12-19 VITALS — DIASTOLIC BLOOD PRESSURE: 71 MMHG | SYSTOLIC BLOOD PRESSURE: 118 MMHG | HEART RATE: 70 BPM

## 2017-12-19 DIAGNOSIS — H35.3221 EXUDATIVE AGE-RELATED MACULAR DEGENERATION OF LEFT EYE WITH ACTIVE CHOROIDAL NEOVASCULARIZATION: Primary | ICD-10-CM

## 2017-12-19 DIAGNOSIS — H35.3112 NONEXUDATIVE AGE-RELATED MACULAR DEGENERATION, RIGHT EYE, INTERMEDIATE DRY STAGE: ICD-10-CM

## 2017-12-19 DIAGNOSIS — H25.13 NUCLEAR SCLEROSIS OF BOTH EYES: ICD-10-CM

## 2017-12-19 PROCEDURE — 67028 INJECTION EYE DRUG: CPT | Mod: LT,S$GLB,, | Performed by: OPHTHALMOLOGY

## 2017-12-19 PROCEDURE — 92134 CPTRZ OPH DX IMG PST SGM RTA: CPT | Mod: S$GLB,,, | Performed by: OPHTHALMOLOGY

## 2017-12-19 PROCEDURE — 92014 COMPRE OPH EXAM EST PT 1/>: CPT | Mod: 25,S$GLB,, | Performed by: OPHTHALMOLOGY

## 2017-12-19 RX ADMIN — Medication 1.25 MG: at 12:12

## 2017-12-19 NOTE — PATIENT INSTRUCTIONS

## 2017-12-19 NOTE — PROGRESS NOTES
HPI     5 week / OCT / Avastin   DLS- 11/14/2017       Pt states no change noticed in va. Denies pain in eyes just slight sinus   pain above eyes x couple years     (-)Flashes (-)Floaters  (+)Photophobia  (-)Glare      HPI     DLS: 10/10/17     Pt states he has a hard time reading near. Glare is a bother at night. Pt denies floaters and flashes. Pt have been having sinuses.     Eyemeds  Refresh OU PRN     HPI     Eye Problem    Additional comments: 4 week check           Comments   DLS 9/5/17- He is still having trouble with vision at night- unchanged HPI     DLS 7/.21/17-     Per patient, no noticeable changes x last visit      OCT - OD - subfoveal drusen  OS - parafoveal PED with SRF resolved      A/P    1. Wet AMD OS  -Avastin OS # 10  5/17 - slight increase at 6 weeks  7/17 - stable - resume slow extension  9/17 - Increase at 6 weeks    Avastin #11 OS today     2. Dry AMD OD  AREDS/AG    3. Early NS OU    RTC 5 weeks OCT    Risks, benefits, and alternatives to treatment discussed in detail with the patient.  The patient voiced understanding and wished to proceed with the procedure    Injection Procedure Note:  Diagnosis: Wet AMD OS    Topical Proparacaine and Betadine.  Inject Avastin OS at 6:00 @ 3.5-4mm posterior to limbus  Post Operative Dx: Same  Complications: None  Follow up as above.from previous visits

## 2018-01-23 ENCOUNTER — PROCEDURE VISIT (OUTPATIENT)
Dept: OPHTHALMOLOGY | Facility: CLINIC | Age: 78
End: 2018-01-23
Payer: MEDICARE

## 2018-01-23 VITALS — SYSTOLIC BLOOD PRESSURE: 121 MMHG | DIASTOLIC BLOOD PRESSURE: 60 MMHG | HEART RATE: 68 BPM

## 2018-01-23 DIAGNOSIS — H35.3112 NONEXUDATIVE AGE-RELATED MACULAR DEGENERATION, RIGHT EYE, INTERMEDIATE DRY STAGE: ICD-10-CM

## 2018-01-23 DIAGNOSIS — H25.13 NUCLEAR SCLEROSIS OF BOTH EYES: ICD-10-CM

## 2018-01-23 DIAGNOSIS — H35.3221 EXUDATIVE AGE-RELATED MACULAR DEGENERATION OF LEFT EYE WITH ACTIVE CHOROIDAL NEOVASCULARIZATION: Primary | ICD-10-CM

## 2018-01-23 PROCEDURE — 67028 INJECTION EYE DRUG: CPT | Mod: LT,S$GLB,, | Performed by: OPHTHALMOLOGY

## 2018-01-23 PROCEDURE — 92134 CPTRZ OPH DX IMG PST SGM RTA: CPT | Mod: S$GLB,,, | Performed by: OPHTHALMOLOGY

## 2018-01-23 PROCEDURE — 92014 COMPRE OPH EXAM EST PT 1/>: CPT | Mod: 25,S$GLB,, | Performed by: OPHTHALMOLOGY

## 2018-01-23 RX ADMIN — Medication 1.25 MG: at 11:01

## 2018-01-23 NOTE — PROGRESS NOTES
HPI     5 week / OCT / Avastin   DLS- 12/19/2017 Dr. Encinas       Pt states no change noticed in va. Had subconj heme a couple weeks after   last appointment. No other changes.     (-)Flashes (+)Floaters  (+)Photophobia  (-)Glare      HPI     5 week / OCT / Avastin   DLS- 11/14/2017       Pt states no change noticed in va. Denies pain in eyes just slight sinus   pain above eyes x couple years     (-)Flashes (-)Floaters  (+)Photophobia  (-)Glare      HPI     DLS: 10/10/17     Pt states he has a hard time reading near. Glare is a bother at night. Pt denies floaters and flashes. Pt have been having sinuses.     Eyemeds  Refresh OU PRN     HPI     Eye Problem    Additional comments: 4 week check           Comments   DLS 9/5/17- He is still having trouble with vision at night- unchanged HPI     DLS 7/.21/17-     Per patient, no noticeable changes x last visit      OCT - OD - subfoveal drusen  OS - parafoveal PED with SRF resolved      A/P    1. Wet AMD OS  -Avastin OS # 11  5/17 - slight increase at 6 weeks  7/17 - stable - resume slow extension  9/17 - Increase at 6 weeks    Avastin #12 OS today     2. Dry AMD OD  AREDS/AG    3. Early NS OU    RTC 5 weeks OCT    Risks, benefits, and alternatives to treatment discussed in detail with the patient.  The patient voiced understanding and wished to proceed with the procedure    Injection Procedure Note:  Diagnosis: Wet AMD OS    Topical Proparacaine and Betadine.  Inject Avastin OS at 6:00 @ 3.5-4mm posterior to limbus  Post Operative Dx: Same  Complications: None  Follow up as above.from previous visits

## 2018-01-23 NOTE — PATIENT INSTRUCTIONS

## 2018-02-27 ENCOUNTER — PROCEDURE VISIT (OUTPATIENT)
Dept: OPHTHALMOLOGY | Facility: CLINIC | Age: 78
End: 2018-02-27
Payer: MEDICARE

## 2018-02-27 VITALS — SYSTOLIC BLOOD PRESSURE: 135 MMHG | HEART RATE: 70 BPM | DIASTOLIC BLOOD PRESSURE: 70 MMHG

## 2018-02-27 DIAGNOSIS — H35.3112 NONEXUDATIVE AGE-RELATED MACULAR DEGENERATION, RIGHT EYE, INTERMEDIATE DRY STAGE: ICD-10-CM

## 2018-02-27 DIAGNOSIS — H35.3221 EXUDATIVE AGE-RELATED MACULAR DEGENERATION OF LEFT EYE WITH ACTIVE CHOROIDAL NEOVASCULARIZATION: Primary | ICD-10-CM

## 2018-02-27 PROCEDURE — 92134 CPTRZ OPH DX IMG PST SGM RTA: CPT | Mod: S$GLB,,, | Performed by: OPHTHALMOLOGY

## 2018-02-27 PROCEDURE — 99499 UNLISTED E&M SERVICE: CPT | Mod: S$GLB,,, | Performed by: OPHTHALMOLOGY

## 2018-02-27 PROCEDURE — 67028 INJECTION EYE DRUG: CPT | Mod: LT,S$GLB,, | Performed by: OPHTHALMOLOGY

## 2018-02-27 RX ORDER — POLYETHYLENE GLYCOL 3350 17 G/17G
POWDER, FOR SOLUTION ORAL
COMMUNITY
Start: 2018-01-09 | End: 2018-07-10

## 2018-02-27 RX ADMIN — Medication 1.25 MG: at 11:02

## 2018-02-27 NOTE — PATIENT INSTRUCTIONS

## 2018-02-27 NOTE — PROGRESS NOTES
HPI     Eye Problem    Additional comments: 5 week check           Comments   DLS 1/23/18- No changes x last visit      HPI     5 week / OCT / Avastin   DLS- 12/19/2017 Dr. Encinas       Pt states no change noticed in va. Had subconj heme a couple weeks after   last appointment. No other changes.     (-)Flashes (+)Floaters  (+)Photophobia  (-)Glare      HPI     5 week / OCT / Avastin   DLS- 11/14/2017       Pt states no change noticed in va. Denies pain in eyes just slight sinus   pain above eyes x couple years     (-)Flashes (-)Floaters  (+)Photophobia  (-)Glare      HPI     DLS: 10/10/17     Pt states he has a hard time reading near. Glare is a bother at night. Pt denies floaters and flashes. Pt have been having sinuses.     Eyemeds  Refresh OU PRN     HPI     Eye Problem    Additional comments: 4 week check           Comments   DLS 9/5/17- He is still having trouble with vision at night- unchanged HPI     DLS 7/.21/17-     Per patient, no noticeable changes x last visit      OCT - OD - subfoveal drusen  OS - parafoveal PED with SRF resolved      A/P    1. Wet AMD OS  -Avastin OS # 12  5/17 - slight increase at 6 weeks  7/17 - stable - resume slow extension  9/17 - Increase at 6 weeks  2/18 - stable - try slow extension again    Avastin #13 OS today     2. Dry AMD OD  AREDS/AG    3. Early NS OU    RTC 6 weeks OCT    Risks, benefits, and alternatives to treatment discussed in detail with the patient.  The patient voiced understanding and wished to proceed with the procedure    Injection Procedure Note:  Diagnosis: Wet AMD OS    Topical Proparacaine and Betadine.  Inject Avastin OS at 6:00 @ 3.5-4mm posterior to limbus  Post Operative Dx: Same  Complications: None  Follow up as above.from previous visits

## 2018-04-10 ENCOUNTER — PROCEDURE VISIT (OUTPATIENT)
Dept: OPHTHALMOLOGY | Facility: CLINIC | Age: 78
End: 2018-04-10
Payer: MEDICARE

## 2018-04-10 VITALS — DIASTOLIC BLOOD PRESSURE: 69 MMHG | SYSTOLIC BLOOD PRESSURE: 119 MMHG | HEART RATE: 71 BPM

## 2018-04-10 DIAGNOSIS — H35.3112 NONEXUDATIVE AGE-RELATED MACULAR DEGENERATION, RIGHT EYE, INTERMEDIATE DRY STAGE: ICD-10-CM

## 2018-04-10 DIAGNOSIS — H35.3221 EXUDATIVE AGE-RELATED MACULAR DEGENERATION OF LEFT EYE WITH ACTIVE CHOROIDAL NEOVASCULARIZATION: Primary | ICD-10-CM

## 2018-04-10 PROCEDURE — 92014 COMPRE OPH EXAM EST PT 1/>: CPT | Mod: S$GLB,,, | Performed by: OPHTHALMOLOGY

## 2018-04-10 PROCEDURE — 67028 INJECTION EYE DRUG: CPT | Mod: LT,S$GLB,, | Performed by: OPHTHALMOLOGY

## 2018-04-10 PROCEDURE — 92134 CPTRZ OPH DX IMG PST SGM RTA: CPT | Mod: S$GLB,,, | Performed by: OPHTHALMOLOGY

## 2018-04-10 RX ADMIN — Medication 1.25 MG: at 11:04

## 2018-04-10 NOTE — PROGRESS NOTES
HPI     Eye Problem    Additional comments: 6 week check           Comments   DLS 2/27/18- Not much change x last visit but over the past year he has noticed more difficulty reading        OCT - OD - subfoveal drusen  OS - parafoveal PED with SRF resolved      A/P    1. Wet AMD OS  -Avastin OS # 13  5/17 - slight increase at 6 weeks  7/17 - stable - resume slow extension  9/17 - Increase at 6 weeks  2/18 - stable - try slow extension again  4/18 continue slow extension    Avastin OS today     2. Dry AMD OD  AREDS/AG    3. Early NS OU    RTC 7 weeks OCT    Risks, benefits, and alternatives to treatment discussed in detail with the patient.  The patient voiced understanding and wished to proceed with the procedure    Injection Procedure Note:  Diagnosis: Wet AMD OS    Topical Proparacaine and Betadine.  Inject Avastin OS at 6:00 @ 3.5-4mm posterior to limbus  Post Operative Dx: Same  Complications: None  Follow up as above.from previous visits

## 2018-04-10 NOTE — PATIENT INSTRUCTIONS

## 2018-04-13 ENCOUNTER — TELEPHONE (OUTPATIENT)
Dept: UROLOGY | Facility: CLINIC | Age: 78
End: 2018-04-13

## 2018-04-16 ENCOUNTER — OFFICE VISIT (OUTPATIENT)
Dept: UROLOGY | Facility: CLINIC | Age: 78
End: 2018-04-16
Payer: MEDICARE

## 2018-04-16 VITALS
BODY MASS INDEX: 31.15 KG/M2 | RESPIRATION RATE: 18 BRPM | WEIGHT: 198.44 LBS | DIASTOLIC BLOOD PRESSURE: 51 MMHG | HEART RATE: 64 BPM | HEIGHT: 67 IN | SYSTOLIC BLOOD PRESSURE: 104 MMHG

## 2018-04-16 DIAGNOSIS — N40.1 BPH WITH URINARY OBSTRUCTION: Primary | ICD-10-CM

## 2018-04-16 DIAGNOSIS — N13.8 BPH WITH URINARY OBSTRUCTION: Primary | ICD-10-CM

## 2018-04-16 PROCEDURE — 99999 PR PBB SHADOW E&M-EST. PATIENT-LVL IV: CPT | Mod: PBBFAC,,, | Performed by: UROLOGY

## 2018-04-16 PROCEDURE — 99214 OFFICE O/P EST MOD 30 MIN: CPT | Mod: S$GLB,,, | Performed by: UROLOGY

## 2018-04-16 RX ORDER — DIAZEPAM 5 MG/1
5 TABLET ORAL ONCE
Qty: 1 TABLET | Refills: 0 | Status: SHIPPED | OUTPATIENT
Start: 2018-04-16 | End: 2018-04-30 | Stop reason: SDUPTHER

## 2018-04-16 NOTE — LETTER
April 23, 2018        Wisam Gomez MD  [address]  [place] [address]             [place] [facility]  [address] [place] [address] [address]  [place] [address]  Phone: [phone]  Fax: [phone]   Patient: Giovanni Zavaleta Sr.   MR Number: [id number]   YOB: 1940   Date of Visit: 4/16/2018       Dear Dr. Gomez:    Thank you for referring Giovanni Zavaleta to me for evaluation. Attached you will find relevant portions of my assessment and plan of care.    If you have questions, please do not hesitate to call me. I look forward to following Giovanni Zavaleta along with you.    Sincerely,        Itz Toledo MD            CC  No Recipients    Enclosure

## 2018-04-21 NOTE — PROGRESS NOTES
"[facility] Urology Progress Note    Giovanni Zavaleta Sr. is a 77 y.o. male who presents for fu of BPH/LUTS    Last seen on 12/1/17 at referral of Dr Gomez for worsening urgency, frequency, and LUTS despite flomax. PSA 0.91 3/8/17, eGFR 51.  Also noted to have hyperlipidemia, hypertension, CAD, near syncopal event (working in yard, dehydrated), CKD, PVD, DJD. Asa/Plavix  He does have history of lower extremity bypass with aortic aneurysm repair and aortobifem.  This did then get blocked and needed to be revised ×2.  He is also noted to have bulging disks and spinal stenosis diagnosed 2 years ago by Dr. Hewitt but has not elected to undergo operative management at this time  DTF 5, NTF 3-4, urgency with all voids and QOD day urge incontinence. 2 cups coffee/day down from 10 all day long, but also sig tea intake and ++ spicy foods (crystal on everything), quit smoking 10 yrs ago  No difference in sxs on flomax. PVR 68cc. AUA SS: 17/5 (unhappy) : 5: Urgency; 4: Frequency; 3: Nocturia; 2: Intermittency, weak stream; 1: Emptying - medications helped 1 out of 10.   Continued Flomax, added finasteride, discussed conservative measures to decrease urgency and frequency    He returns today noting:   Drank a lot before coming in but couldn't void - bladder scan only 60cc  AUA symptom score 17/4 (5: Urgency; 3: Frequency, nocturia; 2: Emptying, intermittency, weak stream) - meds helped 3 out of 10  Cystoscopy 15-20 years ago had "something in his bladder " - he reports this was not a tumor or a stone but was a deposit/buildup.  Frequency every 2-2-1/2 hours.  Nocturia 3 times at night.  He has some small dribbling urge incontinence once per day.  He does have soft daily bowel movements with MiraLAX  He has significantly cut back on spicy foods - used to put hot sauce on almost everything he now only does this every Monday with his red beans and occasionally when he needs to get it.  Subjectively he is "a little better "  He eats " "late at night close to bed and drinks lemon water  Still drinking 2-3 glasses of tea a day  Has cut down to 1-2 cups of coffee in the morning from 3-4 and still has about 2 cups in the afternoon but no longer after 3 pm    ROS: A comprehensive 10 system review was performed and is negative except as noted above in HPI    PHYSICAL EXAM:    Vitals:    04/16/18 1117   BP: (!) 104/51   Pulse: 64   Resp: 18     Body mass index is 31.08 kg/m². Weight: 90 kg (198 lb 6.6 oz) Height: 5' 7" (170.2 cm)       General: Alert, cooperative, no distress, appears stated age   Head: Normocephalic, without obvious abnormality, atraumatic   Eyes: PERRL, conjunctiva/corneas clear   Lungs: Respirations unlabored   Heart: Warm and well perfused   Abdomen: soft NT ND  Extremities: Extremities normal, atraumatic, no cyanosis or edema   Skin: Skin color, texture, turgor normal, no rashes or lesions   Psych: Appropriate   Neurologic: Non-focal       ASSESSMENT   1. BPH with urinary obstruction  POCT Bladder Scan    Case Request Operating Room: CYSTOSCOPY, TRANSRECTAL ULTRASOUND    Place in Outpatient    Vital Signs        Plan    He has had minimal improvement in his lower urinary tract symptoms on Flomax and finasteride dual medical therapy.  He does still have some urgency with mild urge incontinence and has made only slight effort at conservative measures and dietary changes to limiting bladder irritants as he is drinking a lot of coffee and tea.  Again stressed the importance of conservative measures to decrease urgency and frequency.  We did discuss that some of his urgency and frequency may be a result of long-standing obstruction from enlarged prostate as he does have a 40-50 g digital rectal exam.  As his LUTS are no different on dual medical therapy, before starting any OAB meds, discussed further evaluation of his lower tract for significant obstruction with cystoscopy, as well as concurrent transrectal ultrasound for accurate " volumetric measurement of prostate to help guide further recommendations.  Procedures in detail discussed including local anesthesia with intraurethral lidocaine jelly.  He did request oral sedation with Valium and 1 doses been prescribed to his pharmacy.

## 2018-04-23 RX ORDER — LIDOCAINE HYDROCHLORIDE 20 MG/ML
JELLY TOPICAL ONCE
Status: CANCELLED | OUTPATIENT
Start: 2018-04-23 | End: 2018-04-23

## 2018-04-30 RX ORDER — DIAZEPAM 5 MG/1
5 TABLET ORAL ONCE
Qty: 1 TABLET | Refills: 0 | Status: SHIPPED | OUTPATIENT
Start: 2018-04-30 | End: 2018-10-01

## 2018-04-30 NOTE — TELEPHONE ENCOUNTER
----- Message from Dana Hannah sent at 4/30/2018  7:44 AM CDT -----  Contact: Kathy  Patient's wife regarding medication that was to be taken just before procedure; had filled and now can not find medication.     [facility]/pharmacy #[id number] - [place], [place] - [address] AT [facility]  [address]  [place] [address]  Phone: [phone] Fax: [phone]    Also please call as needs to reschedule procedure [phone] or [phone]. Thanks!

## 2018-04-30 NOTE — TELEPHONE ENCOUNTER
Patient is scheduled for TRUS 5/15.  The patient got the Valium filled at the pharmacy, but now cannot find it.  Wife says she has looked everywhere.  Would like to have another one sent to then [facility] pharmacy.      Patient would like reschedule the procedure, as 15 is their anniversary.  Would like to reschedule to 5/22.  [facility] has been contacted to move patient. Surgery book updated..

## 2018-05-22 ENCOUNTER — SURGERY (OUTPATIENT)
Age: 78
End: 2018-05-22

## 2018-05-22 ENCOUNTER — HOSPITAL ENCOUNTER (OUTPATIENT)
Facility: AMBULARY SURGERY CENTER | Age: 78
Discharge: HOME | End: 2018-05-22
Attending: UROLOGY | Admitting: UROLOGY
Payer: MEDICARE

## 2018-05-22 DIAGNOSIS — N40.1 BPH WITH URINARY OBSTRUCTION: ICD-10-CM

## 2018-05-22 DIAGNOSIS — N13.8 BPH WITH URINARY OBSTRUCTION: ICD-10-CM

## 2018-05-22 PROCEDURE — 76872 US TRANSRECTAL: CPT | Mod: 26,,, | Performed by: UROLOGY

## 2018-05-22 PROCEDURE — 76872 US TRANSRECTAL: CPT | Performed by: UROLOGY

## 2018-05-22 PROCEDURE — 52000 CYSTOURETHROSCOPY: CPT | Mod: ,,, | Performed by: UROLOGY

## 2018-05-22 PROCEDURE — 52000 CYSTOURETHROSCOPY: CPT | Performed by: UROLOGY

## 2018-05-22 RX ORDER — LIDOCAINE HYDROCHLORIDE 20 MG/ML
JELLY TOPICAL ONCE
Status: COMPLETED | OUTPATIENT
Start: 2018-05-22 | End: 2018-05-22

## 2018-05-22 RX ORDER — TAMSULOSIN HYDROCHLORIDE 0.4 MG/1
0.8 CAPSULE ORAL DAILY
Qty: 60 CAPSULE | Refills: 11 | Status: SHIPPED | OUTPATIENT
Start: 2018-05-22 | End: 2021-02-24 | Stop reason: SDUPTHER

## 2018-05-22 RX ORDER — LIDOCAINE HYDROCHLORIDE 20 MG/ML
JELLY TOPICAL
Status: DISPENSED
Start: 2018-05-22 | End: 2018-05-23

## 2018-05-22 RX ORDER — CIPROFLOXACIN 500 MG/1
500 TABLET ORAL 2 TIMES DAILY
Qty: 6 TABLET | Refills: 0 | Status: SHIPPED | OUTPATIENT
Start: 2018-05-22 | End: 2018-07-10

## 2018-05-22 RX ADMIN — LIDOCAINE HYDROCHLORIDE 5 ML: 20 JELLY TOPICAL at 01:05

## 2018-05-22 NOTE — H&P
"[facility] Urology Progress Note     Giovanni Zavaleta Sr. is a 77 y.o. male who presents for fu of BPH/LUTS     Last seen on 12/1/17 at referral of Dr Gomez for worsening urgency, frequency, and LUTS despite flomax. PSA 0.91 3/8/17, eGFR 51.  Also noted to have hyperlipidemia, hypertension, CAD, near syncopal event (working in yard, dehydrated), CKD, PVD, DJD. Asa/Plavix  He does have history of lower extremity bypass with aortic aneurysm repair and aortobifem.  This did then get blocked and needed to be revised ×2.  He is also noted to have bulging disks and spinal stenosis diagnosed 2 years ago by Dr. Hewitt but has not elected to undergo operative management at this time  DTF 5, NTF 3-4, urgency with all voids and QOD day urge incontinence. 2 cups coffee/day down from 10 all day long, but also sig tea intake and ++ spicy foods (crystal on everything), quit smoking 10 yrs ago  No difference in sxs on flomax. PVR 68cc. AUA SS: 17/5 (unhappy) : 5: Urgency; 4: Frequency; 3: Nocturia; 2: Intermittency, weak stream; 1: Emptying - medications helped 1 out of 10.   Continued Flomax, added finasteride, discussed conservative measures to decrease urgency and frequency     He returns today noting:   Drank a lot before coming in but couldn't void - bladder scan only 60cc  AUA symptom score 17/4 (5: Urgency; 3: Frequency, nocturia; 2: Emptying, intermittency, weak stream) - meds helped 3 out of 10  Cystoscopy 15-20 years ago had "something in his bladder " - he reports this was not a tumor or a stone but was a deposit/buildup.  Frequency every 2-2-1/2 hours.  Nocturia 3 times at night.  He has some small dribbling urge incontinence once per day.  He does have soft daily bowel movements with MiraLAX  He has significantly cut back on spicy foods - used to put hot sauce on almost everything he now only does this every Monday with his red beans and occasionally when he needs to get it.  Subjectively he is "a little better "  He " "eats late at night close to bed and drinks lemon water  Still drinking 2-3 glasses of tea a day  Has cut down to 1-2 cups of coffee in the morning from 3-4 and still has about 2 cups in the afternoon but no longer after 3 pm     ROS: A comprehensive 10 system review was performed and is negative except as noted above in HPI     PHYSICAL EXAM:         Vitals:     04/16/18 1117   BP: (!) 104/51   Pulse: 64   Resp: 18      Body mass index is 31.08 kg/m². Weight: 90 kg (198 lb 6.6 oz) Height: 5' 7" (170.2 cm)         General: Alert, cooperative, no distress, appears stated age   Head: Normocephalic, without obvious abnormality, atraumatic   Eyes: PERRL, conjunctiva/corneas clear   Lungs: Respirations unlabored   Heart: Warm and well perfused   Abdomen: soft NT ND  Extremities: Extremities normal, atraumatic, no cyanosis or edema   Skin: Skin color, texture, turgor normal, no rashes or lesions   Psych: Appropriate   Neurologic: Non-focal         ASSESSMENT   1. BPH with urinary obstruction  POCT Bladder Scan     Case Request Operating Room: CYSTOSCOPY, TRANSRECTAL ULTRASOUND     Place in Outpatient     Vital Signs          Plan    He has had minimal improvement in his lower urinary tract symptoms on Flomax and finasteride dual medical therapy.  He does still have some urgency with mild urge incontinence and has made only slight effort at conservative measures and dietary changes to limiting bladder irritants as he is drinking a lot of coffee and tea.  Again stressed the importance of conservative measures to decrease urgency and frequency.  We did discuss that some of his urgency and frequency may be a result of long-standing obstruction from enlarged prostate as he does have a 40-50 g digital rectal exam.  As his LUTS are no different on dual medical therapy, before starting any OAB meds, discussed further evaluation of his lower tract for significant obstruction with cystoscopy, as well as concurrent transrectal " ultrasound for accurate volumetric measurement of prostate to help guide further recommendations.  Procedures in detail discussed including local anesthesia with intraurethral lidocaine jelly.  He did request oral sedation with Valium and 1 doses been prescribed to his pharmacy.    Pt is acceptable candidate for procedure at [facility]

## 2018-05-22 NOTE — OP NOTE
[facility] Urology Operative Report     Date: 05/22/2018     Staff Surgeon: Itz Toledo MD     Pre-Op Diagnosis: refractory BPH/LUTS     Post-Op Diagnosis: same     Procedure(s) Performed:   1. Cystoscopy, flexible  2. Transrectal ultrasound measurement of prostate volume     Specimen(s): none     Anesthesia: Local: 2% xylocaine jelly per urethra (urojet)     Findings:   Prostate volume 35.5cc (W 52.2, H 30.2, L 43.1), no median lobe  Cysto: significant lateral lobe obstruction with kissing lobes centrally, no median lobe, short prostatic urethra, grade 1-2 trabeculations     Estimated Blood Loss: none     Drains: none     Complications: none      Indications for procedure: Mr Zavaleta is a 78 yo M with moderate LUTS which continue despite flomax and finasteride at [facility] 17/4, including urgency. Bowel regimen with miralax, working on decreasing coffee.  Here today for cystoscopic evaluation and TRUS to evaluate prostate for further treatment recs.     Procedure in detail:  After informed consent, the patient was first placed in left lateral position.    US probe inserted into rectum and prostate visualized on screen. No ultrasound abnormalities of the prostate. Measurements taken as above with total volume of 35.5cc. On saggital view, he had no median lobe.     Ultrasound probe removed and he was then placed in  supine position and prepped and drapped in standard cystoscopic fashion and 2% xylocaine jelly was instilled into the urethra. A flexible cystoscope was passed into the bladder via the urethra.      Anterior urethra appeared normal without any lesions or narrowings. The prostatic urethra demonstrated significant ingrowth of bilateral lateral lobes with central kissing lateral lobes, though prostatic urethra overall short. Bladder systematically inspected and no mucosal lesions or tumors seen. Bladder otherwise normal except for mild grade 1-2 trabeculations diffusely, no cellules.    Bilateral ureteral  orifices seen in orthotopic position on the trigone with clear efflux bilaterally     On retroflexion, open bladder neck without median lobe with mild erythema around bladder neck. Ureteral orifices clearly seen in orthotopic position on trigone on retroflexion unobstructed.      Patient tolerated the procedure well. No complications     Disposition:   home today status post uncomplicated procedure as above  Follow up: 3 months for uroflow/pvr, symptom reassessment  - dual medical therapy flomax+finasteride in interim, with increase of dose of flomax to 0.8mg, and add myrbetriq for urgency. Continue to decrease coffee intake (he is down to 3-4 cups/day)  - consider future Urolift vs. TURP    Discharge:  home today status post uncomplicated procedure as above  Diet - resume home diet  Follow up: 3 months for uroflow/pvr, symptom reassessment  Instructions: take abx as directed, drink a lot of water, may see blood in urine  Meds:     Medication List      START taking these medications    ciprofloxacin HCl 500 MG tablet  Commonly known as:  CIPRO  Take 1 tablet (500 mg total) by mouth 2 (two) times daily.     mirabegron 50 mg Tb24  Take 1 tablet (50 mg total) by mouth once daily.        CHANGE how you take these medications    tamsulosin 0.4 mg Cp24  Commonly known as:  FLOMAX  Take 2 capsules (0.8 mg total) by mouth once daily.  What changed:  how much to take        CONTINUE taking these medications    albuterol 90 mcg/actuation inhaler  Commonly known as:  VENTOLIN HFA  Inhale 2 puffs into the lungs every 6 (six) hours as needed for Wheezing.     * atorvastatin 20 MG tablet  Commonly known as:  LIPITOR     * atorvastatin 40 MG tablet  Commonly known as:  LIPITOR     clopidogrel 75 mg tablet  Commonly known as:  PLAVIX     diazePAM 5 MG tablet  Commonly known as:  VALIUM  Take 1 tablet (5 mg total) by mouth once. At check in for procedure     fenofibrate 160 MG Tab     finasteride 5 mg tablet  Commonly known as:   PROSCAR  Take 1 tablet (5 mg total) by mouth once daily.     labetalol 200 MG tablet  Commonly known as:  NORMODYNE     polyethylene glycol 17 gram/dose powder  Commonly known as:  GLYCOLAX        * This list has 2 medication(s) that are the same as other medications prescribed for you. Read the directions carefully, and ask your doctor or other care provider to review them with you.               Where to Get Your Medications      These medications were sent to [facility]/pharmacy #[id number] - [place], [place] - [address] AT [facility]  [address], [place] [address]    Phone:  [phone]   · ciprofloxacin HCl 500 MG tablet  · mirabegron 50 mg Tb24  · tamsulosin 0.4 mg Cp24

## 2018-05-22 NOTE — DISCHARGE INSTRUCTIONS
Recovery After Procedural Sedation (Adult)  You have been given medicine by vein to make you sleep during your surgery. This may have included both a pain medicine and sleeping medicine. Most of the effects have worn off. But you may still have some drowsiness for the next 6 to 8 hours.  Home care  Follow these guidelines when you get home:  · For the next 8 hours, you should be watched by a responsible adult. This person should make sure your condition is not getting worse.  · Don't drink any alcohol for the next 24 hours.  · Don't drive, operate dangerous machinery, or make important business or personal decisions during the next 24 hours.  Note: Your healthcare provider may tell you not to take any medicine by mouth for pain or sleep in the next 4 hours. These medicines may react with the medicines you were given in the hospital. This could cause a much stronger response than usual.  Follow-up care  Follow up with your healthcare provider if you are not alert and back to your usual level of activity within 12 hours.  When to seek medical advice  Call your healthcare provider right away if any of these occur:  · Drowsiness gets worse  · Weakness or dizziness gets worse  · Repeated vomiting  · You can't be awakened   Date Last Reviewed: 10/18/2016  © 7320-6090 [employer]. [address] [place] [address], [place] [address]. All rights reserved. This information is not intended as a substitute for professional medical care. Always follow your healthcare professional's instructions.        Cystoscopy    Cystoscopy is a procedure that lets your doctor look directly inside your urethra and bladder. It can be used to:  · Help diagnose a problem with your urethra, bladder, or kidneys.  · Take a sample (biopsy) of bladder or urethral tissue.  · Treat certain problems (such as removing kidney stones).  · Place a stent to bypass an obstruction.  · Take special X-rays of the kidneys.  Based on the findings, your  doctor may recommend other tests or treatments.  What is a cystoscope?  A cystoscope is a telescope-like instrument that contains lenses and fiberoptics (small glass wires that make bright light). The cystoscope may be straight and rigid, or flexible to bend around curves in the urethra. The doctor may look directly into the cystoscope, or project the image onto a monitor.  Getting ready  · Ask your doctor if you should stop taking any medicines before the procedure.  · Ask whether you should avoid eating or drinking anything after midnight before the procedure.  · Follow any other instructions your doctor gives you.  Tell your doctor before the exam if you:  · Take any medicines, such as aspirin or blood thinners  · Have allergies to any medicines  · Are pregnant   The procedure  Cystoscopy is done in the doctors office, surgery center, or hospital. The doctor and a nurse are present during the procedure. It takes only a few minutes, longer if a biopsy, X-ray, or treatment needs to be done.  During the procedure:  · You lie on an exam table on your back, knees bent and legs apart. You are covered with a drape.  · Your urethra and the area around it are washed. Anesthetic jelly may be applied to numb the urethra. Other pain medicine is usually not needed. In some cases, you may be offered a mild sedative to help you relax. If a more extensive procedure is to be done, such as a biopsy or kidney stone removal, general anesthesia may be needed.  · The cystoscope is inserted. A sterile fluid is put into the bladder to expand it. You may feel pressure from this fluid.  · When the procedure is done, the cystoscope is removed.  After the procedure  If you had a sedative, general anesthesia, or spinal anesthesia, you must have someone drive you home. Once youre home:  · Drink plenty of fluids.  · You may have burning or light bleeding when you urinate--this is normal.  · Medicines may be prescribed to ease any discomfort  or prevent infection. Take these as directed.  · Call your doctor if you have heavy bleeding or blood clots, burning that lasts more than a day, a fever over 100°F  (38° C), or trouble urinating.  Date Last Reviewed: 1/1/2017  © 0594-4953 [employer]. [address] [place] [address], [place] [address]. All rights reserved. This information is not intended as a substitute for professional medical care. Always follow your healthcare professional's instructions.

## 2018-05-23 VITALS
SYSTOLIC BLOOD PRESSURE: 153 MMHG | RESPIRATION RATE: 18 BRPM | BODY MASS INDEX: 31.08 KG/M2 | DIASTOLIC BLOOD PRESSURE: 76 MMHG | TEMPERATURE: 99 F | OXYGEN SATURATION: 97 % | HEIGHT: 67 IN | HEART RATE: 64 BPM | WEIGHT: 198 LBS

## 2018-05-29 ENCOUNTER — PROCEDURE VISIT (OUTPATIENT)
Dept: OPHTHALMOLOGY | Facility: CLINIC | Age: 78
End: 2018-05-29
Payer: MEDICARE

## 2018-05-29 VITALS — DIASTOLIC BLOOD PRESSURE: 60 MMHG | HEART RATE: 61 BPM | SYSTOLIC BLOOD PRESSURE: 117 MMHG

## 2018-05-29 DIAGNOSIS — H35.3112 NONEXUDATIVE AGE-RELATED MACULAR DEGENERATION, RIGHT EYE, INTERMEDIATE DRY STAGE: ICD-10-CM

## 2018-05-29 DIAGNOSIS — H35.3221 EXUDATIVE AGE-RELATED MACULAR DEGENERATION OF LEFT EYE WITH ACTIVE CHOROIDAL NEOVASCULARIZATION: Primary | ICD-10-CM

## 2018-05-29 PROCEDURE — 67028 INJECTION EYE DRUG: CPT | Mod: LT,S$GLB,, | Performed by: OPHTHALMOLOGY

## 2018-05-29 PROCEDURE — 92134 CPTRZ OPH DX IMG PST SGM RTA: CPT | Mod: S$GLB,,, | Performed by: OPHTHALMOLOGY

## 2018-05-29 PROCEDURE — 99499 UNLISTED E&M SERVICE: CPT | Mod: S$GLB,,, | Performed by: OPHTHALMOLOGY

## 2018-05-29 RX ADMIN — Medication 1.25 MG: at 12:05

## 2018-05-29 NOTE — PROGRESS NOTES
HPI     DLS 04/10/18   76 Y/O M here today for his 7 wk ARMD w/ Active Choroidal   NVO ck after Avastin injection OS. stj        OCT - OD - subfoveal drusen  OS - parafoveal PED with SRF resolved      A/P    1. Wet AMD OS  -Avastin OS # 14  5/17 - slight increase at 6 weeks  7/17 - stable - resume slow extension  9/17 - Increase at 6 weeks  2/18 - stable - try slow extension again  4/18 continue slow extension    Avastin OS today     2. Dry AMD OD  AREDS/AG    3. Early NS OU    RTC 6 weeks OCT    Risks, benefits, and alternatives to treatment discussed in detail with the patient.  The patient voiced understanding and wished to proceed with the procedure    Injection Procedure Note:  Diagnosis: Wet AMD OS    Topical Proparacaine and Betadine.  Inject Avastin OS at 6:00 @ 3.5-4mm posterior to limbus  Post Operative Dx: Same  Complications: None  Follow up as above.from previous visits

## 2018-05-29 NOTE — PATIENT INSTRUCTIONS

## 2018-06-18 RX ORDER — FINASTERIDE 5 MG/1
TABLET, FILM COATED ORAL
Qty: 30 TABLET | Refills: 6 | Status: SHIPPED | OUTPATIENT
Start: 2018-06-18 | End: 2019-05-20 | Stop reason: SDUPTHER

## 2018-07-10 ENCOUNTER — PROCEDURE VISIT (OUTPATIENT)
Dept: OPHTHALMOLOGY | Facility: CLINIC | Age: 78
End: 2018-07-10
Payer: MEDICARE

## 2018-07-10 VITALS — SYSTOLIC BLOOD PRESSURE: 133 MMHG | HEART RATE: 81 BPM | DIASTOLIC BLOOD PRESSURE: 70 MMHG

## 2018-07-10 DIAGNOSIS — H35.3221 EXUDATIVE AGE-RELATED MACULAR DEGENERATION OF LEFT EYE WITH ACTIVE CHOROIDAL NEOVASCULARIZATION: Primary | ICD-10-CM

## 2018-07-10 DIAGNOSIS — H35.30 ARMD (AGE RELATED MACULAR DEGENERATION): ICD-10-CM

## 2018-07-10 DIAGNOSIS — H35.3112 NONEXUDATIVE AGE-RELATED MACULAR DEGENERATION, RIGHT EYE, INTERMEDIATE DRY STAGE: ICD-10-CM

## 2018-07-10 PROCEDURE — 67028 INJECTION EYE DRUG: CPT | Mod: LT,S$GLB,, | Performed by: OPHTHALMOLOGY

## 2018-07-10 PROCEDURE — 92134 CPTRZ OPH DX IMG PST SGM RTA: CPT | Mod: S$GLB,,, | Performed by: OPHTHALMOLOGY

## 2018-07-10 PROCEDURE — 99499 UNLISTED E&M SERVICE: CPT | Mod: S$GLB,,, | Performed by: OPHTHALMOLOGY

## 2018-07-10 RX ADMIN — Medication 1.25 MG: at 11:07

## 2018-07-10 NOTE — PATIENT INSTRUCTIONS

## 2018-07-10 NOTE — PROGRESS NOTES
HPI     6 week / OCT / Avastin   DLS- 05/29/2018 Dr. Encinas     Pt sts vision is dim not as bright as it use to be getting worse   gradually.   Denies pain   (-)Flashes (-)Floaters  (-)Photophobia  (-)Glare      OCT - OD - subfoveal drusen  OS - parafoveal PED with SRF improving      A/P    1. Wet AMD OS  -Avastin OS # 15  5/17 - slight increase at 6 weeks  7/17 - stable - resume slow extension  9/17 - Increase at 6 weeks  2/18 - stable - try slow extension again  4/18 continue slow extension    Avastin OS today     2. Dry AMD OD  AREDS/AG    3. Early NS OU    RTC 7 weeks OCT    Risks, benefits, and alternatives to treatment discussed in detail with the patient.  The patient voiced understanding and wished to proceed with the procedure    Injection Procedure Note:  Diagnosis: Wet AMD OS    Topical Proparacaine and Betadine.  Inject Avastin OS at 6:00 @ 3.5-4mm posterior to limbus  Post Operative Dx: Same  Complications: None  Follow up as above.from previous visits

## 2018-08-21 ENCOUNTER — PROCEDURE VISIT (OUTPATIENT)
Dept: OPHTHALMOLOGY | Facility: CLINIC | Age: 78
End: 2018-08-21
Payer: MEDICARE

## 2018-08-21 VITALS — HEART RATE: 72 BPM | DIASTOLIC BLOOD PRESSURE: 75 MMHG | SYSTOLIC BLOOD PRESSURE: 148 MMHG

## 2018-08-21 DIAGNOSIS — H35.3221 EXUDATIVE AGE-RELATED MACULAR DEGENERATION OF LEFT EYE WITH ACTIVE CHOROIDAL NEOVASCULARIZATION: Primary | ICD-10-CM

## 2018-08-21 DIAGNOSIS — H35.3112 NONEXUDATIVE AGE-RELATED MACULAR DEGENERATION, RIGHT EYE, INTERMEDIATE DRY STAGE: ICD-10-CM

## 2018-08-21 PROCEDURE — 92134 CPTRZ OPH DX IMG PST SGM RTA: CPT | Mod: S$GLB,,, | Performed by: OPHTHALMOLOGY

## 2018-08-21 PROCEDURE — 92014 COMPRE OPH EXAM EST PT 1/>: CPT | Mod: 25,S$GLB,, | Performed by: OPHTHALMOLOGY

## 2018-08-21 PROCEDURE — 67028 INJECTION EYE DRUG: CPT | Mod: LT,S$GLB,, | Performed by: OPHTHALMOLOGY

## 2018-08-21 RX ADMIN — Medication 1.25 MG: at 12:08

## 2018-08-21 NOTE — PATIENT INSTRUCTIONS

## 2018-08-21 NOTE — PROGRESS NOTES
HPI     6 week / OCT / Avastin   DLS- 07/10/2018 Dr. Encinas     Pt sts vision is about since the last visit.  Denies pain   (-)Flashes (-)Floaters  (-)Photophobia  (-)Glare    At's PRN       HPI     6 week / OCT / Avastin   DLS- 05/29/2018 Dr. Encinas     Pt sts vision is dim not as bright as it use to be getting worse   gradually.   Denies pain   (-)Flashes (-)Floaters  (-)Photophobia  (-)Glare      OCT - OD - subfoveal drusen  OS - parafoveal PED with SRF improving      A/P    1. Wet AMD OS  -Avastin OS # 15  5/17 - slight increase at 6 weeks  7/17 - stable - resume slow extension  9/17 - Increase at 6 weeks  2/18 - stable - try slow extension again  4/18 continue slow extension  8/18 - slight increase of fluid at 7 weejs    Avastin OS today     Plan PDT OS in 2 weeks    2. Dry AMD OD  AREDS/AG    3. Early NS OU    RTC 2 weeks FA/ICG/PDT OS    Risks, benefits, and alternatives to treatment discussed in detail with the patient.  The patient voiced understanding and wished to proceed with the procedure    Injection Procedure Note:  Diagnosis: Wet AMD OS    Topical Proparacaine and Betadine.  Inject Avastin OS at 6:00 @ 3.5-4mm posterior to limbus  Post Operative Dx: Same  Complications: None  Follow up as above.from previous visits

## 2018-10-01 ENCOUNTER — OFFICE VISIT (OUTPATIENT)
Dept: ORTHOPEDICS | Facility: CLINIC | Age: 78
End: 2018-10-01
Payer: MEDICARE

## 2018-10-01 VITALS
DIASTOLIC BLOOD PRESSURE: 68 MMHG | HEIGHT: 67 IN | BODY MASS INDEX: 28.72 KG/M2 | SYSTOLIC BLOOD PRESSURE: 114 MMHG | WEIGHT: 183 LBS | HEART RATE: 67 BPM

## 2018-10-01 DIAGNOSIS — M19.011 OSTEOARTHRITIS OF GLENOHUMERAL JOINT, RIGHT: Primary | ICD-10-CM

## 2018-10-01 DIAGNOSIS — M54.12 CERVICAL RADICULITIS: ICD-10-CM

## 2018-10-01 PROCEDURE — 1101F PT FALLS ASSESS-DOCD LE1/YR: CPT | Mod: ,,, | Performed by: ORTHOPAEDIC SURGERY

## 2018-10-01 PROCEDURE — 72040 X-RAY EXAM NECK SPINE 2-3 VW: CPT | Mod: ,,, | Performed by: ORTHOPAEDIC SURGERY

## 2018-10-01 PROCEDURE — 99214 OFFICE O/P EST MOD 30 MIN: CPT | Mod: ,,, | Performed by: ORTHOPAEDIC SURGERY

## 2018-10-01 RX ORDER — DIAZEPAM 5 MG/1
5 TABLET ORAL
Qty: 2 TABLET | Refills: 0 | Status: SHIPPED | OUTPATIENT
Start: 2018-10-01 | End: 2019-01-15

## 2018-10-01 NOTE — PROGRESS NOTES
[facility]    Subjective:     Chief Complaint:   Chief Complaint   Patient presents with    Right Shoulder - Pain     Right shoulder pain x states that the injection only helped for about 3 days. States that the pain is starting to get worse. States that he is having problems with lifting his arm above his head.        Past Medical History:   Diagnosis Date    CAD (coronary artery disease)     CKD (chronic kidney disease) stage 3, GFR 30-59 ml/min     Diverticulosis     DJD (degenerative joint disease)     HLD (hyperlipidemia)     Hypertension     Macular degeneration     PVD (peripheral vascular disease)        Past Surgical History:   Procedure Laterality Date    CHOLECYSTECTOMY      COLON SURGERY  2012    10 inches removed     CORONARY ARTERY BYPASS GRAFT  2009,2010,2012    Dr Abhishek Lantigua     CYSTOSCOPY N/A 5/22/2018    Performed by [facility]    TONSILLECTOMY      at 19 y/o     TRANSRECTAL ULTRASOUND N/A 5/22/2018    Performed by [facility] at [facility]       Current Outpatient Medications   Medication Sig    albuterol (VENTOLIN HFA) 90 mcg/actuation inhaler Inhale 2 puffs into the lungs every 6 (six) hours as needed for Wheezing.    atorvastatin (LIPITOR) 20 MG tablet     atorvastatin (LIPITOR) 40 MG tablet     clopidogrel (PLAVIX) 75 mg tablet     fenofibrate 160 MG Tab     finasteride (PROSCAR) 5 mg tablet TAKE 1 TABLET ONE TIME DAILY    labetalol (NORMODYNE) 200 MG tablet     mirabegron 50 mg Tb24 Take 1 tablet (50 mg total) by mouth once daily.    tamsulosin (FLOMAX) 0.4 mg Cp24 Take 2 capsules (0.8 mg total) by mouth once daily.    diazePAM (VALIUM) 5 MG tablet Take 1 tablet (5 mg total) by mouth once. At check in for procedure     No current facility-administered medications for this visit.        Review of patient's allergies indicates:   Allergen Reactions    Pravastatin Rash       Family History   Problem Relation Age of Onset     Heart disease Father     Hypertension Brother        Social History     Socioeconomic History    Marital status:      Spouse name: Not on file    Number of children: Not on file    Years of education: Not on file    Highest education level: Not on file   Social Needs    Financial resource strain: Not on file    Food insecurity - worry: Not on file    Food insecurity - inability: Not on file    Transportation needs - medical: Not on file    Transportation needs - non-medical: Not on file   Occupational History    Not on file   Tobacco Use    Smoking status: Former Smoker     Types: Cigarettes     Start date: 1/1/1960     Last attempt to quit: 8/1/2008     Years since quitting: 10.1    Smokeless tobacco: Never Used   Substance and Sexual Activity    Alcohol use: Not on file    Drug use: Not on file    Sexual activity: Not on file   Other Topics Concern    Not on file   Social History Narrative    Not on file       History of present illness so this man had injection right shoulder he said he gets good relief but only a few days pain is right back. He's tried some exercises but that has not solve the problem either. Pain in his right shoulder with activities. At rest he does pretty well. Some of the pain goes down his arm on the right side. No issues left shoulder. He is right-handed. Also spine issues. He scattered diagnosis spinal stenosis lumbar but has not had much in way of treatment. He does have a stiff neck as well as.  Review of Systems:    Constitution: Negative for chills, fever, and sweats.  Negative for unexplained weight loss.    HENT:  Negative for headaches and blurry vision.    Cardiovascular:Negative for chest pain or irregular heart beat. Negative for hypertension.    Respiratory:  Negative for cough and shortness of breath.    Gastrointestinal: Negative for abdominal pain, heartburn, melena, nausea, and vomitting.    Genitourinary:  Negative bladder incontinence and  dysuria.    Musculoskeletal:  See HPI for details.     Neurological: Negative for numbness.    Psychiatric/Behavioral: Negative for depression.  The patient is not nervous/anxious.      Endocrine: Negative for polyuria    Hematologic/Lymphatic: Negative for bleeding problem.  Does not bruise/bleed easily.    Skin: Negative for poor would healing and rash    Objective:      Physical Examination:    Vital Signs:    Vitals:    10/01/18 0815   BP: 114/68   Pulse: 67       Body mass index is 28.66 kg/m².    This a well-developed, well nourished patient in no acute distress.  They are alert and oriented and cooperative to examination.        So physical exam shows he does have some decreased motion C-spine Spurling to the right gives him a little bit right shoulder pain but I don't  a strong pain down the arm. The right shoulder clearly has decreased motion with pain. For flexion to about 120 abduction 75 Max ,,,,internal and external rotation severely limited right shoulder but not on the left.  Pertinent New Results:    XRAY Report / Interpretation:   X-ray cervical spine AP and lateral show a flattening normal cervical lordosis. The AP shows significant tenderness facet disease on both sides. No scoliosis. Lateral is hard to interpret since we see only to C5. Cannot evaluate C5-6 and C6-7. There is some mild arthritic changes C4-5.    Assessment/Plan:      So my impression is he scattered I think both some arthritic change right shoulder. Prior x-ray had shown moderate left humeral arthritis with spurring of the humeral head but there was some preservation of the joint space. In the subdeltoid space still looks good. I think he has both arthritis right shoulder because he has painful limited motion and he has cervical radiculopathy. Our plan is MRI the shoulder which would give us some information when it is time to operate on the shoulder and MRI cervical spine to see if he has significant signs for  radiculopathy which I think he will and we would treat that with epidurals in his neck. Now he scattered a lot other arthritic issues I don't know if he wants to proceed with surgery on her shoulder or not. I would not reinject the shoulder since he only had a few days of relief.      This note was created using Dragon voice recognition software that occasionally misinterpreted phrases or words.

## 2018-10-09 ENCOUNTER — PROCEDURE VISIT (OUTPATIENT)
Dept: OPHTHALMOLOGY | Facility: CLINIC | Age: 78
End: 2018-10-09
Payer: MEDICARE

## 2018-10-09 VITALS — HEART RATE: 68 BPM | SYSTOLIC BLOOD PRESSURE: 115 MMHG | DIASTOLIC BLOOD PRESSURE: 69 MMHG

## 2018-10-09 DIAGNOSIS — H35.3112 NONEXUDATIVE AGE-RELATED MACULAR DEGENERATION, RIGHT EYE, INTERMEDIATE DRY STAGE: ICD-10-CM

## 2018-10-09 DIAGNOSIS — H35.3221 EXUDATIVE AGE-RELATED MACULAR DEGENERATION OF LEFT EYE WITH ACTIVE CHOROIDAL NEOVASCULARIZATION: Primary | ICD-10-CM

## 2018-10-09 PROCEDURE — 92235 FLUORESCEIN ANGRPH MLTIFRAME: CPT | Mod: 50,PBBFAC | Performed by: OPHTHALMOLOGY

## 2018-10-09 PROCEDURE — 99499 UNLISTED E&M SERVICE: CPT | Mod: S$PBB,,, | Performed by: OPHTHALMOLOGY

## 2018-10-09 PROCEDURE — 67221 OCULAR PHOTODYNAMIC THER: CPT | Mod: S$PBB,LT,, | Performed by: OPHTHALMOLOGY

## 2018-10-09 PROCEDURE — 67221 OCULAR PHOTODYNAMIC THER: CPT | Mod: PBBFAC,LT | Performed by: OPHTHALMOLOGY

## 2018-10-09 PROCEDURE — 96372 THER/PROPH/DIAG INJ SC/IM: CPT | Mod: PBBFAC

## 2018-10-09 PROCEDURE — 92242 FLUORESCEIN&ICG ANGIOGRAPHY: CPT | Mod: PBBFAC | Performed by: OPHTHALMOLOGY

## 2018-10-09 PROCEDURE — 96374 THER/PROPH/DIAG INJ IV PUSH: CPT | Mod: PBBFAC,59

## 2018-10-09 RX ADMIN — VERTEPORFIN FOR INJECTION 15 MG: 15 INJECTION, POWDER, LYOPHILIZED, FOR SOLUTION INTRAVENOUS at 12:10

## 2018-10-09 NOTE — PROGRESS NOTES
HPI     DLS 8/21/18    weeks ck     Here for PDT    Pt noticed that VA is the same     Pt states about 3 weeks ago while looking up at the clouds, noticed multiple floater with one big metallic floater in the left eye---multiple floaters is gone but still seeing the big one  DLS- 07/10/2018 Dr. Encinas          OCT - OD - subfoveal drusen  OS - parafoveal PED with SRF improving    FA/ICG - late leakage in area of PED      A/P    1. Wet AMD OS  -Avastin OS # 16  5/17 - slight increase at 6 weeks  7/17 - stable - resume slow extension  9/17 - Increase at 6 weeks  2/18 - stable - try slow extension again  4/18 continue slow extension  8/18 - slight increase of fluid at 7 weejs    Plan PDT OSToday    2. Dry AMD OD  AREDS/AG    3. Early NS OU    6 weeks OCT    Risks, benefits, and alternatives to treatment discussed in detail with the patient.  The patient voiced understanding and wished to proceed with the procedure    Laser Procedure Note  Dx: Wet AMD OS  Laser: PDT OS  Opal  Spot: smallest to cover leakage  Dur: 42s  #:  2  Complications: None  F/U as above

## 2018-12-04 ENCOUNTER — OFFICE VISIT (OUTPATIENT)
Dept: OPHTHALMOLOGY | Facility: CLINIC | Age: 78
End: 2018-12-04
Payer: MEDICARE

## 2018-12-04 VITALS — DIASTOLIC BLOOD PRESSURE: 65 MMHG | SYSTOLIC BLOOD PRESSURE: 99 MMHG | HEART RATE: 65 BPM

## 2018-12-04 DIAGNOSIS — H35.3221 EXUDATIVE AGE-RELATED MACULAR DEGENERATION OF LEFT EYE WITH ACTIVE CHOROIDAL NEOVASCULARIZATION: Primary | ICD-10-CM

## 2018-12-04 DIAGNOSIS — H35.3112 NONEXUDATIVE AGE-RELATED MACULAR DEGENERATION, RIGHT EYE, INTERMEDIATE DRY STAGE: ICD-10-CM

## 2018-12-04 PROCEDURE — 92134 CPTRZ OPH DX IMG PST SGM RTA: CPT | Mod: S$GLB,,, | Performed by: OPHTHALMOLOGY

## 2018-12-04 PROCEDURE — 92014 COMPRE OPH EXAM EST PT 1/>: CPT | Mod: 25,S$GLB,, | Performed by: OPHTHALMOLOGY

## 2018-12-04 PROCEDURE — 99999 PR PBB SHADOW E&M-EST. PATIENT-LVL III: CPT | Mod: PBBFAC,,, | Performed by: OPHTHALMOLOGY

## 2018-12-04 PROCEDURE — 67028 INJECTION EYE DRUG: CPT | Mod: LT,S$GLB,, | Performed by: OPHTHALMOLOGY

## 2018-12-04 RX ADMIN — Medication 1.25 MG: at 11:12

## 2018-12-04 NOTE — PROGRESS NOTES
HPI     Eye Problem      Additional comments: 6 week check              Comments     DLS 10/9/18- He feels like over the past year his vision has gradually declined. He now has to use magnifier all the time to read the newspaper whereas before he only needed it sometimes      OCT - OD - subfoveal drusen  OS - parafoveal PED with SRF increase    Prior FA/ICG - late leakage in area of PED      A/P    1. Wet AMD OS  -Avastin OS # 16  S/p PDT OS 12/4/18 5/17 - slight increase at 6 weeks  7/17 - stable - resume slow extension  9/17 - Increase at 6 weeks  2/18 - stable - try slow extension again  4/18 continue slow extension  8/18 - slight increase of fluid at 7 weejs    Plan Avastin today    2. Dry AMD OD  AREDS/AG    3. Early NS OU    6 weeks OCT    Risks, benefits, and alternatives to treatment discussed in detail with the patient.  The patient voiced understanding and wished to proceed with the procedure    Injection Procedure Note:  Diagnosis: Wet AMD OS    Patient Identified and Time Out complete  Topical Proparacaine and Betadine.  Inject Avastin OS at 6:00 @ 3.5-4mm posterior to limbus  Post Operative Dx: Same  Complications: None  Follow up as above.

## 2018-12-04 NOTE — PATIENT INSTRUCTIONS

## 2019-01-15 ENCOUNTER — PROCEDURE VISIT (OUTPATIENT)
Dept: OPHTHALMOLOGY | Facility: CLINIC | Age: 79
End: 2019-01-15
Payer: MEDICARE

## 2019-01-15 VITALS — DIASTOLIC BLOOD PRESSURE: 72 MMHG | HEART RATE: 79 BPM | SYSTOLIC BLOOD PRESSURE: 129 MMHG

## 2019-01-15 DIAGNOSIS — H35.3221 EXUDATIVE AGE-RELATED MACULAR DEGENERATION OF LEFT EYE WITH ACTIVE CHOROIDAL NEOVASCULARIZATION: Primary | ICD-10-CM

## 2019-01-15 DIAGNOSIS — H35.3112 NONEXUDATIVE AGE-RELATED MACULAR DEGENERATION, RIGHT EYE, INTERMEDIATE DRY STAGE: ICD-10-CM

## 2019-01-15 PROCEDURE — 92134 CPTRZ OPH DX IMG PST SGM RTA: CPT | Mod: S$GLB,,, | Performed by: OPHTHALMOLOGY

## 2019-01-15 PROCEDURE — 92014 PR EYE EXAM, EST PATIENT,COMPREHESV: ICD-10-PCS | Mod: 25,S$GLB,, | Performed by: OPHTHALMOLOGY

## 2019-01-15 PROCEDURE — 92134 POSTERIOR SEGMENT OCT RETINA (OCULAR COHERENCE TOMOGRAPHY)-BOTH EYES: ICD-10-PCS | Mod: S$GLB,,, | Performed by: OPHTHALMOLOGY

## 2019-01-15 PROCEDURE — 67028 INJECTION EYE DRUG: CPT | Mod: LT,S$GLB,, | Performed by: OPHTHALMOLOGY

## 2019-01-15 PROCEDURE — 92014 COMPRE OPH EXAM EST PT 1/>: CPT | Mod: 25,S$GLB,, | Performed by: OPHTHALMOLOGY

## 2019-01-15 PROCEDURE — 67028 PR INJECT INTRAVITREAL PHARMCOLOGIC: ICD-10-PCS | Mod: LT,S$GLB,, | Performed by: OPHTHALMOLOGY

## 2019-01-15 RX ADMIN — Medication 1.25 MG: at 12:01

## 2019-01-15 NOTE — PATIENT INSTRUCTIONS

## 2019-01-15 NOTE — PROGRESS NOTES
HPI     6 wk / OCT / Avastin  DLS-12/04/2018 Dr. Encinas     Pt sts still has same floater & Reading vision getting weaker. No other changes denies pain   (+)Flashes sees 2-3 colors when turning real fast (+)Floaters  (-)Photophobia  (-)Glare    Refresh Tears PRN       OCT - OD - subfoveal drusen  OS - parafoveal PED with SRF decrease    Prior FA/ICG - late leakage in area of PED      A/P    1. Wet AMD OS  -Avastin OS # 16  S/p PDT OS 12/4/18 5/17 - slight increase at 6 weeks  7/17 - stable - resume slow extension  9/17 - Increase at 6 weeks  2/18 - stable - try slow extension again  4/18 continue slow extension  8/18 - slight increase of fluid at 7 weeks  1/19 - SRF resolved! Try extension        2. Dry AMD OD  AREDS/AG    3. Early NS OU    9-10 weeks OCT    Risks, benefits, and alternatives to treatment discussed in detail with the patient.  The patient voiced understanding and wished to proceed with the procedure    Injection Procedure Note:  Diagnosis: Wet AMD OS    Patient Identified and Time Out complete  Topical Proparacaine and Betadine.  Inject Avastin OS at 6:00 @ 3.5-4mm posterior to limbus  Post Operative Dx: Same  Complications: None  Follow up as above.

## 2019-06-24 ENCOUNTER — TELEPHONE (OUTPATIENT)
Dept: UROLOGY | Facility: CLINIC | Age: 79
End: 2019-06-24

## 2019-06-24 RX ORDER — FINASTERIDE 5 MG/1
TABLET, FILM COATED ORAL
Qty: 90 TABLET | Refills: 6 | Status: SHIPPED | OUTPATIENT
Start: 2019-06-24 | End: 2020-07-13

## 2019-06-24 NOTE — TELEPHONE ENCOUNTER
----- Message from Itz Toledo MD sent at 6/24/2019 10:44 AM CDT -----  Doesn't appear he ever followed up for any of this  I renewed his finasteride refill request but he needs NV for uroflow/pvr/auass in next few weeks, then EP visit booked with me 3 mos later

## 2019-06-24 NOTE — TELEPHONE ENCOUNTER
Spoke with patient's wife.  Advised that Dr. Toledo would like patient to come in for some f/u urine tests then follow up with the doctor.  She says she will talk with him and call back to schedule.

## 2019-06-25 ENCOUNTER — TELEPHONE (OUTPATIENT)
Dept: UROLOGY | Facility: CLINIC | Age: 79
End: 2019-06-25

## 2019-06-25 NOTE — TELEPHONE ENCOUNTER
----- Message from Preston Sams sent at 6/25/2019  4:02 PM CDT -----  Type:  Patient Returning Call    Who Called:  Kathy Zavaleta (Spouse)  Who Left Message for Patient:  Shelly  Does the patient know what this is regarding?:  Appointment  Best Call Back Number:  [phone]  (cell) [phone]  Additional Information:

## 2019-07-08 ENCOUNTER — TELEPHONE (OUTPATIENT)
Dept: UROLOGY | Facility: CLINIC | Age: 79
End: 2019-07-08

## 2019-07-08 NOTE — TELEPHONE ENCOUNTER
----- Message from Yuly Avalos sent at 7/8/2019 10:26 AM CDT -----  Contact: Basil  Type: Needs Medical Advice    Who Called:  Patient's wife Basil  Symptoms (please be specific):    How long has patient had these symptoms:    Pharmacy name and phone #:    Best Call Back Number: [phone]  Additional Information:called to discuss upcoming appointment,requesting a call back

## 2019-07-08 NOTE — TELEPHONE ENCOUNTER
Patient's wife advised that patient should come with a full bladder for UFS, will also scan bladder to assess emptying.  He will also be asked to complete a symptom score sheet.

## 2019-07-11 ENCOUNTER — CLINICAL SUPPORT (OUTPATIENT)
Dept: UROLOGY | Facility: CLINIC | Age: 79
End: 2019-07-11
Payer: MEDICARE

## 2019-07-11 DIAGNOSIS — N40.1 BPH WITH URINARY OBSTRUCTION: Primary | ICD-10-CM

## 2019-07-11 DIAGNOSIS — N13.8 BPH WITH URINARY OBSTRUCTION: Primary | ICD-10-CM

## 2019-07-11 LAB — POC RESIDUAL URINE VOLUME: 129 ML (ref 0–100)

## 2019-07-11 PROCEDURE — 51798 POCT BLADDER SCAN: ICD-10-PCS | Mod: S$GLB,,, | Performed by: UROLOGY

## 2019-07-11 PROCEDURE — 51798 US URINE CAPACITY MEASURE: CPT | Mod: S$GLB,,, | Performed by: UROLOGY

## 2019-07-11 NOTE — PROGRESS NOTES
1. Uroflow results (date: 7/11/19) on  :   Voiding time: 19.9s,   Flow time: 17.4s,   TTP flow: 13.2s,   Peak flowrate: 6.5 mL/s,   Average flowrate: 3.4mL/s,   Intervals: 2,    Voided volume: 60 mL,    Pvr by bladder scan: 129.   Pattern of curve: to be determined by physician.    Patient may return today to repeat test.  If not today will call to reschedule.

## 2019-07-15 ENCOUNTER — TELEPHONE (OUTPATIENT)
Dept: UROLOGY | Facility: CLINIC | Age: 79
End: 2019-07-15

## 2019-07-15 NOTE — TELEPHONE ENCOUNTER
----- Message from Preston Sams sent at 7/15/2019  9:25 AM CDT -----  Type: Needs Medical Advice    Who Called:  Kathy Zavaleta (Spouse)    Best Call Back Number: [phone] (cell) [phone]  Additional Information: Caller states that she would like a callback from Shelly regarding rescheduling an ultrasound for the patient

## 2019-07-18 ENCOUNTER — CLINICAL SUPPORT (OUTPATIENT)
Dept: UROLOGY | Facility: CLINIC | Age: 79
End: 2019-07-18
Payer: MEDICARE

## 2019-07-18 DIAGNOSIS — N13.8 BPH WITH URINARY OBSTRUCTION: Primary | ICD-10-CM

## 2019-07-18 DIAGNOSIS — N40.1 BPH WITH URINARY OBSTRUCTION: Primary | ICD-10-CM

## 2019-07-18 LAB — POC RESIDUAL URINE VOLUME: 70 ML (ref 0–100)

## 2019-07-18 PROCEDURE — 99499 UNLISTED E&M SERVICE: CPT | Mod: S$GLB,,, | Performed by: UROLOGY

## 2019-07-18 PROCEDURE — 99499 NO LOS: ICD-10-PCS | Mod: S$GLB,,, | Performed by: UROLOGY

## 2019-07-18 PROCEDURE — 51798 US URINE CAPACITY MEASURE: CPT | Mod: S$GLB,,, | Performed by: UROLOGY

## 2019-07-18 PROCEDURE — 51798 POCT BLADDER SCAN: ICD-10-PCS | Mod: S$GLB,,, | Performed by: UROLOGY

## 2019-07-18 NOTE — PROGRESS NOTES
Uroflow results (date: 7/18/19) on  :   Voiding time: 33.8s,   Flow time: 29.0s,   TTP flow: 6.7s,   Peak flowrate: 12.2 mL/s,   Average flowrate: 6.8mL/s,   Intervals: 3,    Voided volume: 196 mL,    Pvr by bladder scan: 70.   Pattern of curve: to be determined by physician.

## 2019-10-13 NOTE — PROGRESS NOTES
"[facility] Urology Progress Note    Giovanni Zavaleta Sr. is a 79 y.o. male who presents for fu of BPH/LUTS     Referred 12/1/17 by Dr Gomez for worsening urgency, frequency, and LUTS despite flomax. PSA 0.91 3/8/17, eGFR 51.  Also noted to have hyperlipidemia, hypertension, CAD, near syncopal event (working in yard, dehydrated), CKD, PVD, DJD. Asa/Plavix  He does have history of lower extremity bypass with aortic aneurysm repair and aortobifem.  This did then get blocked and needed to be revised ×2.  He is also noted to have bulging disks and spinal stenosis diagnosed 2 years ago by Dr. Hewitt but has not elected to undergo operative management at this time    At that time, No difference in sxs on flomax. PVR 68cc. AUA SS: 17/5 (unhappy) : 5: Urgency; 4: Frequency; 3: Nocturia; 2: Intermittency, weak stream; 1: Emptying - medications helped 1 out of 10.   DTF 5, NTF 3-4, urgency with all voids and QOD day urge incontinence. 2 cups coffee/day down from 10 all day long, but also sig tea intake and ++ spicy foods (crystal on everything), quit smoking 10 yrs ago  Continued Flomax, added finasteride, discussed conservative measures to decrease urgency and frequency     4/2018:  PVR 60cc. AUA symptom score 17/4 (5: Urgency; 3: Frequency, nocturia; 2: Emptying, intermittency, weak stream) - meds helped 3 out of 10  Frequency every 2-2-1/2 hours.  Nocturia 3 times at night.  He has some small dribbling urge incontinence once per day. He does have soft daily bowel movements with MiraLAX  He has significantly cut back on spicy foods -  used to put hot sauce on almost everything he now only does this every Monday with his red beans and occasionally when he needs to get it.  Subjectively he is "a little better ". He eats late at night close to bed and drinks lemon water. Still drinking 2-3 glasses of tea a day.   Has cut down to 1-2 cups of coffee in the morning from 3-4 and still has about 2 cups in the afternoon but no longer " "after 3 pm    Cysto/trus 5/22/18  Prostate volume 35.5cc (W 52.2, H 30.2, L 43.1), no median lobe  Cysto: significant lateral lobe obstruction with kissing lobes centrally, no median lobe, short prostatic urethra, grade 1-2 trabeculations  Dual medical therapy flomax+finasteride, with increase of dose of flomax to 0.8mg, and add myrbetriq for urgency. Continue to decrease coffee intake (he is down to 3-4 cups/day)  - return for uroflow/pvr in 3 months, and consider future Urolift vs. TURP    Did not return for UFS.PVR until 7/11/19 and voided only 60cc with PVR 129cc with 2 distinct curves  He returned 7/18/19 for uroflow voiding 196cc (with pvr only 70cc) with Qm 16.2cc/s, Qa 6.8cc/s, 3 intervals - does have good intial bell curve voiding pattern and then 2nd intermittent low peaking curve to empty  He returns today noting:  AUA SS:  16/4 (4:  Weak stream; 3:  Urgency, sleeping; 2:  Frequency, intermittency; 1:  Emptying, straining).  Postvoid residual by bladder scan is 40 cc  He is not taking Myrbetriq due to cost, and is on Ditropan as per Dr. bernard  He has reduced coffee intake some, as he used to drink it up until going to bed, but now has his last cup of coffee between 530 and 6:00 p.m..  He has decreased hot sauce.  His urgency is a little bit better, and he has increased time between urinations  He does have problems with his CPAP and he is awaiting a new mask.  Poor fit.  With mask can get a good 5 hr of sleep.  The is getting injections for macular degeneration.  Has financial stress related to medications medical care.  Not interested in further intervention at this time.        ROS: A comprehensive 10 system review was performed and is negative except as noted above in HPI    PHYSICAL EXAM:    Vitals:    10/14/19 1036   BP: 113/65   Pulse: 76   Resp: 18     Body mass index is 29.69 kg/m². Weight: 86 kg (189 lb 9.5 oz) Height: 5' 7" (170.2 cm)       General: Alert, cooperative, no distress, appears " stated age   Head: Normocephalic, without obvious abnormality, atraumatic   Eyes: PERRL, conjunctiva/corneas clear   Lungs: Respirations unlabored   Heart: Warm and well perfused   Abdomen:  Soft, nontender, nondistended  :  Normal circumcised phallus, bilaterally descended testes and a normal scrotum with slight left varicocele, IMANI:  40+ g smooth nonnodular  Extremities: Extremities normal, atraumatic, no cyanosis or edema   Skin: Skin color, texture, turgor normal, no rashes or lesions   Psych: Appropriate   Neurologic: Non-focal       Recent Results (from the past 336 hour(s))   POCT Bladder Scan    Collection Time: 10/14/19 11:22 AM   Result Value Ref Range    POC Residual Urine Volume 40 0 - 100 mL       ASSESSMENT   1. BPH with urinary obstruction  POCT Bladder Scan       Plan    At this time he finds his lower urinary tract symptoms stable on dual medical therapy with Flomax and finasteride.  He does still have some urgency and frequency though these are both better since last evaluation.  He was unable to afford the Myrbetriq and is on oxybutynin.  We did discuss the concerns about oxybutynin at his age with dry eyes, dry mouth, constipation, changes in mentation and risk of dementia.  He denies any complications from the medication at this time and will continue.  He does have significant gains to make in his conservative recommendations as far as urgency and frequency ago, especially as it relates to limiting bladder irritants.  He is no longer drinking coffee up until bedtime, though we did discuss that his afternoon and evening coffee intake, and his total coffee intake in general are still detrimental to his urgency and frequency.  As well, his nocturia will improve with better control of his CPAP as we did discuss the relationship of nocturia 2 obstructive sleep apnea independent of prostatic obstruction.  He would like to continue on current medical regimen at this time, and will follow up in 6  months for repeat symptom assessment and repeat uroflow PVR.

## 2019-10-14 ENCOUNTER — OFFICE VISIT (OUTPATIENT)
Dept: UROLOGY | Facility: CLINIC | Age: 79
End: 2019-10-14
Payer: MEDICARE

## 2019-10-14 VITALS
DIASTOLIC BLOOD PRESSURE: 65 MMHG | RESPIRATION RATE: 18 BRPM | HEART RATE: 76 BPM | WEIGHT: 189.63 LBS | HEIGHT: 67 IN | BODY MASS INDEX: 29.76 KG/M2 | SYSTOLIC BLOOD PRESSURE: 113 MMHG

## 2019-10-14 DIAGNOSIS — N13.8 BPH WITH URINARY OBSTRUCTION: Primary | ICD-10-CM

## 2019-10-14 DIAGNOSIS — N40.1 BPH WITH URINARY OBSTRUCTION: Primary | ICD-10-CM

## 2019-10-14 LAB — POC RESIDUAL URINE VOLUME: 40 ML (ref 0–100)

## 2019-10-14 PROCEDURE — 99999 PR PBB SHADOW E&M-EST. PATIENT-LVL III: CPT | Mod: PBBFAC,,, | Performed by: UROLOGY

## 2019-10-14 PROCEDURE — 99213 PR OFFICE/OUTPT VISIT, EST, LEVL III, 20-29 MIN: ICD-10-PCS | Mod: S$GLB,,, | Performed by: UROLOGY

## 2019-10-14 PROCEDURE — 51798 POCT BLADDER SCAN: ICD-10-PCS | Mod: S$GLB,,, | Performed by: UROLOGY

## 2019-10-14 PROCEDURE — 51798 US URINE CAPACITY MEASURE: CPT | Mod: S$GLB,,, | Performed by: UROLOGY

## 2019-10-14 PROCEDURE — 99213 OFFICE O/P EST LOW 20 MIN: CPT | Mod: S$GLB,,, | Performed by: UROLOGY

## 2019-10-14 PROCEDURE — 1101F PT FALLS ASSESS-DOCD LE1/YR: CPT | Mod: CPTII,S$GLB,, | Performed by: UROLOGY

## 2019-10-14 PROCEDURE — 1101F PR PT FALLS ASSESS DOC 0-1 FALLS W/OUT INJ PAST YR: ICD-10-PCS | Mod: CPTII,S$GLB,, | Performed by: UROLOGY

## 2019-10-14 PROCEDURE — 99999 PR PBB SHADOW E&M-EST. PATIENT-LVL III: ICD-10-PCS | Mod: PBBFAC,,, | Performed by: UROLOGY

## 2019-10-14 RX ORDER — OXYBUTYNIN CHLORIDE 5 MG/1
5 TABLET, EXTENDED RELEASE ORAL DAILY
COMMUNITY
End: 2019-10-14

## 2019-10-14 RX ORDER — ROSUVASTATIN CALCIUM 10 MG/1
10 TABLET, COATED ORAL DAILY
COMMUNITY
End: 2021-01-26 | Stop reason: SDUPTHER

## 2020-07-28 LAB
CHOLEST SERPL-MSCNC: 104 MG/DL (ref 0–200)
HDLC SERPL-MCNC: 40 MG/DL
LDLC SERPL CALC-MCNC: 48 MG/DL
TRIGL SERPL-MCNC: 77 MG/DL

## 2020-08-27 ENCOUNTER — CLINICAL SUPPORT (OUTPATIENT)
Dept: UROLOGY | Facility: CLINIC | Age: 80
End: 2020-08-27
Payer: MEDICARE

## 2020-08-27 DIAGNOSIS — N13.8 BPH WITH URINARY OBSTRUCTION: Primary | ICD-10-CM

## 2020-08-27 DIAGNOSIS — N40.1 BPH WITH URINARY OBSTRUCTION: Primary | ICD-10-CM

## 2020-08-27 PROCEDURE — 99499 NO LOS: ICD-10-PCS | Mod: S$GLB,,, | Performed by: UROLOGY

## 2020-08-27 PROCEDURE — 99499 UNLISTED E&M SERVICE: CPT | Mod: S$GLB,,, | Performed by: UROLOGY

## 2020-08-27 NOTE — PROGRESS NOTES
AUASS: patient arrived to clinic for uroflow, PVR,   Incomplete emptying- 2  Frequency- 1  Intermittency- 0  Urgency- 4  Weak Stream- 0  Straining- 0  Sleeping- 3  Total-  10  QOL- 3  PVR- 11 ml Uroflow results (date: 08/27/20) on  :   Voiding time: 25.5s,   Flow time: 21.2s,   TTP flow: 5.1s,   Peak flowrate: 16.1 mL/s,   Average flowrate: 9.2mL/s,   Intervals: 2,    Voided volume: 196 mL,    Pvr by bladder scan: 11 ml.   Pattern of curve: to be determined by physician.

## 2020-10-12 ENCOUNTER — OFFICE VISIT (OUTPATIENT)
Dept: PRIMARY CARE CLINIC | Facility: CLINIC | Age: 80
End: 2020-10-12
Payer: MEDICARE

## 2020-10-12 VITALS
HEART RATE: 102 BPM | TEMPERATURE: 99 F | SYSTOLIC BLOOD PRESSURE: 116 MMHG | DIASTOLIC BLOOD PRESSURE: 69 MMHG | RESPIRATION RATE: 17 BRPM | OXYGEN SATURATION: 95 %

## 2020-10-12 DIAGNOSIS — R05.9 COUGH: ICD-10-CM

## 2020-10-12 DIAGNOSIS — Z20.822 SUSPECTED COVID-19 VIRUS INFECTION: Primary | ICD-10-CM

## 2020-10-12 PROCEDURE — 1101F PT FALLS ASSESS-DOCD LE1/YR: CPT | Mod: CPTII,S$GLB,, | Performed by: EMERGENCY MEDICINE

## 2020-10-12 PROCEDURE — 99203 PR OFFICE/OUTPT VISIT, NEW, LEVL III, 30-44 MIN: ICD-10-PCS | Mod: S$GLB,,, | Performed by: EMERGENCY MEDICINE

## 2020-10-12 PROCEDURE — 99203 OFFICE O/P NEW LOW 30 MIN: CPT | Mod: S$GLB,,, | Performed by: EMERGENCY MEDICINE

## 2020-10-12 PROCEDURE — 1159F PR MEDICATION LIST DOCUMENTED IN MEDICAL RECORD: ICD-10-PCS | Mod: S$GLB,,, | Performed by: EMERGENCY MEDICINE

## 2020-10-12 PROCEDURE — U0003 INFECTIOUS AGENT DETECTION BY NUCLEIC ACID (DNA OR RNA); SEVERE ACUTE RESPIRATORY SYNDROME CORONAVIRUS 2 (SARS-COV-2) (CORONAVIRUS DISEASE [COVID-19]), AMPLIFIED PROBE TECHNIQUE, MAKING USE OF HIGH THROUGHPUT TECHNOLOGIES AS DESCRIBED BY CMS-2020-01-R: HCPCS

## 2020-10-12 PROCEDURE — 1159F MED LIST DOCD IN RCRD: CPT | Mod: S$GLB,,, | Performed by: EMERGENCY MEDICINE

## 2020-10-12 PROCEDURE — 1101F PR PT FALLS ASSESS DOC 0-1 FALLS W/OUT INJ PAST YR: ICD-10-PCS | Mod: CPTII,S$GLB,, | Performed by: EMERGENCY MEDICINE

## 2020-10-12 NOTE — PROGRESS NOTES
Subjective:      Time seen by provider: 3:27 PM on 10/12/2020    Giovanni Zavaleta Sr. is a 80 y.o. male with a PMHx of CAD, CKD, HLD, HTN, and diverticulitis who presents for an evaluation of possible COVID-19. The patient complains of nausea without vomiting. The patient admits his wife is experiencing more symptoms. The patient denies cough, fever, diarrhea, runny nose or any other symptoms at this time. PSHx of CABG and tonsillectomy.    Review of Systems   Constitutional: Negative for activity change, appetite change, fatigue and fever.   HENT: Negative for congestion, rhinorrhea and sore throat.    Respiratory: Negative for cough, chest tightness, shortness of breath and wheezing.    Cardiovascular: Negative for chest pain and palpitations.   Gastrointestinal: Positive for nausea. Negative for diarrhea and vomiting.   Musculoskeletal: Negative for arthralgias and myalgias.   Skin: Negative for rash.   Neurological: Negative for weakness, light-headedness and headaches.       Objective:      Physical Exam  Vitals signs and nursing note reviewed.   Constitutional:       General: He is not in acute distress.     Appearance: He is well-developed. He is not diaphoretic.   HENT:      Head: Normocephalic and atraumatic.      Nose: Nose normal.   Eyes:      Conjunctiva/sclera: Conjunctivae normal.   Neck:      Musculoskeletal: Normal range of motion.   Cardiovascular:      Rate and Rhythm: Normal rate and regular rhythm.      Heart sounds: Normal heart sounds. No murmur.   Pulmonary:      Effort: Pulmonary effort is normal. No respiratory distress.      Breath sounds: Normal breath sounds. No wheezing.   Musculoskeletal: Normal range of motion.   Skin:     General: Skin is warm and dry.   Neurological:      Mental Status: He is alert and oriented to person, place, and time.         Assessment and Plan:      Diagnoses and all orders for this visit:    Suspected COVID-19 virus infection  -     COVID-19 Routine  Screening  - Discharge home and await results.   - Return to clinic or ED for new or worsening symptoms.   - Follow-up with PCP as needed.     Scribe Attestation:   I, Keena Jennings, am scribing for, and in the presence of, Darcy Pryor PA-C. I performed the above scribed service and the documentation accurately describes the services I performed. I attest to the accuracy of the note.    I, Darcy Pryor PA-C, personally performed the services described in this documentation. All medical record entries made by the scribe were at my direction and in my presence.  I have reviewed the chart and agree that the record reflects my personal performance and is accurate and complete. Darcy Pryor PA-C.  4:40 PM 10/12/2020

## 2020-10-13 LAB — SARS-COV-2 RNA RESP QL NAA+PROBE: NOT DETECTED

## 2020-11-29 NOTE — PROGRESS NOTES
Symptoms controlled  Emptying well  Uroflow pattern not obstructed  Fu 6 mos with NP - due late jan/early feb 2021

## 2021-01-11 RX ORDER — CLOPIDOGREL BISULFATE 75 MG/1
75 TABLET ORAL DAILY
Qty: 30 TABLET | Refills: 0 | Status: SHIPPED | OUTPATIENT
Start: 2021-01-11 | End: 2023-02-22

## 2021-01-26 ENCOUNTER — TELEPHONE (OUTPATIENT)
Dept: FAMILY MEDICINE | Facility: CLINIC | Age: 81
End: 2021-01-26

## 2021-01-26 DIAGNOSIS — Z98.890 HISTORY OF AAA (ABDOMINAL AORTIC ANEURYSM) REPAIR: ICD-10-CM

## 2021-01-26 DIAGNOSIS — I10 GOOD HYPERTENSION CONTROL: ICD-10-CM

## 2021-01-26 DIAGNOSIS — Z12.5 ENCOUNTER FOR SCREENING FOR MALIGNANT NEOPLASM OF PROSTATE: ICD-10-CM

## 2021-01-26 DIAGNOSIS — N13.8 BPH WITH URINARY OBSTRUCTION: Primary | ICD-10-CM

## 2021-01-26 DIAGNOSIS — N40.1 BPH WITH URINARY OBSTRUCTION: Primary | ICD-10-CM

## 2021-01-26 DIAGNOSIS — I73.9 PERIPHERAL VASCULAR DISEASE: ICD-10-CM

## 2021-01-26 DIAGNOSIS — J44.9 CHRONIC OBSTRUCTIVE PULMONARY DISEASE, UNSPECIFIED COPD TYPE: ICD-10-CM

## 2021-01-26 DIAGNOSIS — E78.5 HYPERLIPIDEMIA, UNSPECIFIED HYPERLIPIDEMIA TYPE: ICD-10-CM

## 2021-01-26 DIAGNOSIS — N18.31 STAGE 3A CHRONIC KIDNEY DISEASE: ICD-10-CM

## 2021-01-26 RX ORDER — ROSUVASTATIN CALCIUM 10 MG/1
10 TABLET, COATED ORAL DAILY
Qty: 90 TABLET | Refills: 0 | Status: SHIPPED | OUTPATIENT
Start: 2021-01-26 | End: 2021-04-01

## 2021-02-11 ENCOUNTER — IMMUNIZATION (OUTPATIENT)
Dept: PRIMARY CARE CLINIC | Facility: CLINIC | Age: 81
End: 2021-02-11
Payer: MEDICARE

## 2021-02-11 DIAGNOSIS — Z23 NEED FOR VACCINATION: Primary | ICD-10-CM

## 2021-02-11 PROCEDURE — 91300 COVID-19, MRNA, LNP-S, PF, 30 MCG/0.3 ML DOSE VACCINE: CPT | Mod: S$GLB,,, | Performed by: FAMILY MEDICINE

## 2021-02-11 PROCEDURE — 0001A COVID-19, MRNA, LNP-S, PF, 30 MCG/0.3 ML DOSE VACCINE: CPT | Mod: S$GLB,,, | Performed by: FAMILY MEDICINE

## 2021-02-11 PROCEDURE — 0001A COVID-19, MRNA, LNP-S, PF, 30 MCG/0.3 ML DOSE VACCINE: ICD-10-PCS | Mod: S$GLB,,, | Performed by: FAMILY MEDICINE

## 2021-02-11 PROCEDURE — 91300 COVID-19, MRNA, LNP-S, PF, 30 MCG/0.3 ML DOSE VACCINE: ICD-10-PCS | Mod: S$GLB,,, | Performed by: FAMILY MEDICINE

## 2021-02-24 DIAGNOSIS — N13.8 BPH WITH URINARY OBSTRUCTION: ICD-10-CM

## 2021-02-24 DIAGNOSIS — I10 HYPERTENSION, UNSPECIFIED TYPE: Primary | ICD-10-CM

## 2021-02-24 DIAGNOSIS — E78.5 HYPERLIPIDEMIA, UNSPECIFIED HYPERLIPIDEMIA TYPE: ICD-10-CM

## 2021-02-24 DIAGNOSIS — N40.1 BPH WITH URINARY OBSTRUCTION: ICD-10-CM

## 2021-02-24 RX ORDER — LABETALOL 200 MG/1
200 TABLET, FILM COATED ORAL DAILY
Qty: 90 TABLET | Refills: 0 | Status: SHIPPED | OUTPATIENT
Start: 2021-02-24 | End: 2021-06-02

## 2021-02-24 RX ORDER — TAMSULOSIN HYDROCHLORIDE 0.4 MG/1
0.8 CAPSULE ORAL DAILY
Qty: 180 CAPSULE | Refills: 0 | Status: SHIPPED | OUTPATIENT
Start: 2021-02-24 | End: 2021-06-02

## 2021-02-24 RX ORDER — OXYBUTYNIN CHLORIDE 5 MG/1
5 TABLET ORAL DAILY
Qty: 90 TABLET | Refills: 0 | Status: SHIPPED | OUTPATIENT
Start: 2021-02-24 | End: 2021-06-02

## 2021-02-24 RX ORDER — OXYBUTYNIN CHLORIDE 5 MG/1
5 TABLET ORAL DAILY
COMMUNITY
End: 2021-02-24 | Stop reason: SDUPTHER

## 2021-02-24 RX ORDER — FENOFIBRATE 160 MG/1
160 TABLET ORAL DAILY
Qty: 90 TABLET | Refills: 0 | Status: SHIPPED | OUTPATIENT
Start: 2021-02-24 | End: 2021-06-16

## 2021-02-25 LAB
ALBUMIN SERPL-MCNC: 4.2 G/DL (ref 3.6–5.1)
ALBUMIN/GLOB SERPL: 2 (CALC) (ref 1–2.5)
ALP SERPL-CCNC: 40 U/L (ref 35–144)
ALT SERPL-CCNC: 11 U/L (ref 9–46)
AST SERPL-CCNC: 12 U/L (ref 10–35)
BASOPHILS # BLD AUTO: 48 CELLS/UL (ref 0–200)
BASOPHILS NFR BLD AUTO: 0.7 %
BILIRUB SERPL-MCNC: 0.6 MG/DL (ref 0.2–1.2)
BUN SERPL-MCNC: 24 MG/DL (ref 7–25)
BUN/CREAT SERPL: 16 (CALC) (ref 6–22)
CALCIUM SERPL-MCNC: 9.7 MG/DL (ref 8.6–10.3)
CHLORIDE SERPL-SCNC: 109 MMOL/L (ref 98–110)
CHOLEST SERPL-MCNC: 122 MG/DL
CHOLEST/HDLC SERPL: 2.8 (CALC)
CO2 SERPL-SCNC: 31 MMOL/L (ref 20–32)
CREAT SERPL-MCNC: 1.46 MG/DL (ref 0.7–1.11)
EOSINOPHIL # BLD AUTO: 297 CELLS/UL (ref 15–500)
EOSINOPHIL NFR BLD AUTO: 4.3 %
ERYTHROCYTE [DISTWIDTH] IN BLOOD BY AUTOMATED COUNT: 13 % (ref 11–15)
GFRSERPLBLD MDRD-ARVRAT: 45 ML/MIN/1.73M2
GLOBULIN SER CALC-MCNC: 2.1 G/DL (CALC) (ref 1.9–3.7)
GLUCOSE SERPL-MCNC: 85 MG/DL (ref 65–99)
HCT VFR BLD AUTO: 47.4 % (ref 38.5–50)
HDLC SERPL-MCNC: 44 MG/DL
HGB BLD-MCNC: 14.9 G/DL (ref 13.2–17.1)
LDLC SERPL CALC-MCNC: 61 MG/DL (CALC)
LYMPHOCYTES # BLD AUTO: 1663 CELLS/UL (ref 850–3900)
LYMPHOCYTES NFR BLD AUTO: 24.1 %
MCH RBC QN AUTO: 26.4 PG (ref 27–33)
MCHC RBC AUTO-ENTMCNC: 31.4 G/DL (ref 32–36)
MCV RBC AUTO: 83.9 FL (ref 80–100)
MONOCYTES # BLD AUTO: 766 CELLS/UL (ref 200–950)
MONOCYTES NFR BLD AUTO: 11.1 %
NEUTROPHILS # BLD AUTO: 4126 CELLS/UL (ref 1500–7800)
NEUTROPHILS NFR BLD AUTO: 59.8 %
NONHDLC SERPL-MCNC: 78 MG/DL (CALC)
PLATELET # BLD AUTO: 210 THOUSAND/UL (ref 140–400)
PMV BLD REES-ECKER: 9.8 FL (ref 7.5–12.5)
POTASSIUM SERPL-SCNC: 4.7 MMOL/L (ref 3.5–5.3)
PROT SERPL-MCNC: 6.3 G/DL (ref 6.1–8.1)
PSA SERPL-MCNC: 0.3 NG/ML
RBC # BLD AUTO: 5.65 MILLION/UL (ref 4.2–5.8)
SODIUM SERPL-SCNC: 145 MMOL/L (ref 135–146)
TRIGL SERPL-MCNC: 91 MG/DL
WBC # BLD AUTO: 6.9 THOUSAND/UL (ref 3.8–10.8)

## 2021-03-04 ENCOUNTER — IMMUNIZATION (OUTPATIENT)
Dept: PRIMARY CARE CLINIC | Facility: CLINIC | Age: 81
End: 2021-03-04
Payer: MEDICARE

## 2021-03-04 DIAGNOSIS — Z23 NEED FOR VACCINATION: Primary | ICD-10-CM

## 2021-03-04 PROCEDURE — 91300 COVID-19, MRNA, LNP-S, PF, 30 MCG/0.3 ML DOSE VACCINE: CPT | Mod: S$GLB,,, | Performed by: FAMILY MEDICINE

## 2021-03-04 PROCEDURE — 0002A COVID-19, MRNA, LNP-S, PF, 30 MCG/0.3 ML DOSE VACCINE: CPT | Mod: CV19,S$GLB,, | Performed by: FAMILY MEDICINE

## 2021-03-04 PROCEDURE — 0002A COVID-19, MRNA, LNP-S, PF, 30 MCG/0.3 ML DOSE VACCINE: ICD-10-PCS | Mod: CV19,S$GLB,, | Performed by: FAMILY MEDICINE

## 2021-03-04 PROCEDURE — 91300 COVID-19, MRNA, LNP-S, PF, 30 MCG/0.3 ML DOSE VACCINE: ICD-10-PCS | Mod: S$GLB,,, | Performed by: FAMILY MEDICINE

## 2021-03-11 ENCOUNTER — OFFICE VISIT (OUTPATIENT)
Dept: FAMILY MEDICINE | Facility: CLINIC | Age: 81
End: 2021-03-11
Payer: MEDICARE

## 2021-03-11 VITALS
DIASTOLIC BLOOD PRESSURE: 66 MMHG | SYSTOLIC BLOOD PRESSURE: 124 MMHG | HEART RATE: 67 BPM | WEIGHT: 191.38 LBS | OXYGEN SATURATION: 95 % | BODY MASS INDEX: 30.04 KG/M2 | HEIGHT: 67 IN | TEMPERATURE: 98 F

## 2021-03-11 DIAGNOSIS — N18.30 STAGE 3 CHRONIC KIDNEY DISEASE, UNSPECIFIED WHETHER STAGE 3A OR 3B CKD: ICD-10-CM

## 2021-03-11 DIAGNOSIS — B35.6 TINEA CRURIS: Primary | ICD-10-CM

## 2021-03-11 PROCEDURE — 3078F DIAST BP <80 MM HG: CPT | Mod: CPTII,S$GLB,, | Performed by: FAMILY MEDICINE

## 2021-03-11 PROCEDURE — 1125F PR PAIN SEVERITY QUANTIFIED, PAIN PRESENT: ICD-10-PCS | Mod: S$GLB,,, | Performed by: FAMILY MEDICINE

## 2021-03-11 PROCEDURE — 3078F PR MOST RECENT DIASTOLIC BLOOD PRESSURE < 80 MM HG: ICD-10-PCS | Mod: CPTII,S$GLB,, | Performed by: FAMILY MEDICINE

## 2021-03-11 PROCEDURE — 3074F PR MOST RECENT SYSTOLIC BLOOD PRESSURE < 130 MM HG: ICD-10-PCS | Mod: CPTII,S$GLB,, | Performed by: FAMILY MEDICINE

## 2021-03-11 PROCEDURE — 99205 OFFICE O/P NEW HI 60 MIN: CPT | Mod: S$GLB,,, | Performed by: FAMILY MEDICINE

## 2021-03-11 PROCEDURE — 1159F MED LIST DOCD IN RCRD: CPT | Mod: S$GLB,,, | Performed by: FAMILY MEDICINE

## 2021-03-11 PROCEDURE — 3074F SYST BP LT 130 MM HG: CPT | Mod: CPTII,S$GLB,, | Performed by: FAMILY MEDICINE

## 2021-03-11 PROCEDURE — 99999 PR PBB SHADOW E&M-EST. PATIENT-LVL IV: CPT | Mod: PBBFAC,,, | Performed by: FAMILY MEDICINE

## 2021-03-11 PROCEDURE — 1159F PR MEDICATION LIST DOCUMENTED IN MEDICAL RECORD: ICD-10-PCS | Mod: S$GLB,,, | Performed by: FAMILY MEDICINE

## 2021-03-11 PROCEDURE — 1101F PR PT FALLS ASSESS DOC 0-1 FALLS W/OUT INJ PAST YR: ICD-10-PCS | Mod: CPTII,S$GLB,, | Performed by: FAMILY MEDICINE

## 2021-03-11 PROCEDURE — 3288F FALL RISK ASSESSMENT DOCD: CPT | Mod: CPTII,S$GLB,, | Performed by: FAMILY MEDICINE

## 2021-03-11 PROCEDURE — 3288F PR FALLS RISK ASSESSMENT DOCUMENTED: ICD-10-PCS | Mod: CPTII,S$GLB,, | Performed by: FAMILY MEDICINE

## 2021-03-11 PROCEDURE — 99999 PR PBB SHADOW E&M-EST. PATIENT-LVL IV: ICD-10-PCS | Mod: PBBFAC,,, | Performed by: FAMILY MEDICINE

## 2021-03-11 PROCEDURE — 1101F PT FALLS ASSESS-DOCD LE1/YR: CPT | Mod: CPTII,S$GLB,, | Performed by: FAMILY MEDICINE

## 2021-03-11 PROCEDURE — 1125F AMNT PAIN NOTED PAIN PRSNT: CPT | Mod: S$GLB,,, | Performed by: FAMILY MEDICINE

## 2021-03-11 PROCEDURE — 99205 PR OFFICE/OUTPT VISIT, NEW, LEVL V, 60-74 MIN: ICD-10-PCS | Mod: S$GLB,,, | Performed by: FAMILY MEDICINE

## 2021-03-11 RX ORDER — CLOTRIMAZOLE AND BETAMETHASONE DIPROPIONATE 10; .64 MG/G; MG/G
CREAM TOPICAL 2 TIMES DAILY
Qty: 45 G | Refills: 1 | Status: SHIPPED | OUTPATIENT
Start: 2021-03-11 | End: 2021-09-09 | Stop reason: SDUPTHER

## 2021-03-11 RX ORDER — MELATONIN 3 MG
1 CAPSULE ORAL NIGHTLY
COMMUNITY

## 2021-03-11 RX ORDER — EPINEPHRINE 0.22MG
100 AEROSOL WITH ADAPTER (ML) INHALATION DAILY
COMMUNITY

## 2021-03-11 RX ORDER — KETOROLAC TROMETHAMINE 5 MG/ML
SOLUTION OPHTHALMIC
COMMUNITY
Start: 2021-03-03 | End: 2021-03-11

## 2021-03-12 ENCOUNTER — PATIENT OUTREACH (OUTPATIENT)
Dept: ADMINISTRATIVE | Facility: HOSPITAL | Age: 81
End: 2021-03-12

## 2021-05-24 ENCOUNTER — OFFICE VISIT (OUTPATIENT)
Dept: URGENT CARE | Facility: CLINIC | Age: 81
End: 2021-05-24
Payer: MEDICARE

## 2021-05-24 VITALS
RESPIRATION RATE: 17 BRPM | TEMPERATURE: 98 F | DIASTOLIC BLOOD PRESSURE: 54 MMHG | SYSTOLIC BLOOD PRESSURE: 146 MMHG | HEART RATE: 75 BPM

## 2021-05-24 DIAGNOSIS — S61.213A LACERATION OF LEFT MIDDLE FINGER WITHOUT FOREIGN BODY WITHOUT DAMAGE TO NAIL, INITIAL ENCOUNTER: Primary | ICD-10-CM

## 2021-05-24 PROCEDURE — 12001 RPR S/N/AX/GEN/TRNK 2.5CM/<: CPT | Mod: S$GLB,,, | Performed by: NURSE PRACTITIONER

## 2021-05-24 PROCEDURE — 90714 PR TD VACCINE 2 PRSRV >/= 7 YO, IM: ICD-10-PCS | Mod: S$GLB,,, | Performed by: NURSE PRACTITIONER

## 2021-05-24 PROCEDURE — 12001 LACERATION REPAIR: ICD-10-PCS | Mod: S$GLB,,, | Performed by: NURSE PRACTITIONER

## 2021-05-24 PROCEDURE — 90471 PR IMMUNIZ ADMIN,1 SINGLE/COMB VAC/TOXOID: ICD-10-PCS | Mod: S$GLB,,, | Performed by: NURSE PRACTITIONER

## 2021-05-24 PROCEDURE — 99213 OFFICE O/P EST LOW 20 MIN: CPT | Mod: 25,S$GLB,, | Performed by: NURSE PRACTITIONER

## 2021-05-24 PROCEDURE — 99213 PR OFFICE/OUTPT VISIT, EST, LEVL III, 20-29 MIN: ICD-10-PCS | Mod: 25,S$GLB,, | Performed by: NURSE PRACTITIONER

## 2021-05-24 PROCEDURE — 90471 IMMUNIZATION ADMIN: CPT | Mod: S$GLB,,, | Performed by: NURSE PRACTITIONER

## 2021-05-24 PROCEDURE — 90714 TD VACC NO PRESV 7 YRS+ IM: CPT | Mod: S$GLB,,, | Performed by: NURSE PRACTITIONER

## 2021-05-24 RX ORDER — MUPIROCIN 20 MG/G
OINTMENT TOPICAL 2 TIMES DAILY
Qty: 30 G | Refills: 0 | Status: SHIPPED | OUTPATIENT
Start: 2021-05-24 | End: 2023-02-22

## 2021-05-24 RX ORDER — CEPHALEXIN 500 MG/1
500 CAPSULE ORAL 3 TIMES DAILY
Qty: 21 CAPSULE | Refills: 0 | Status: SHIPPED | OUTPATIENT
Start: 2021-05-24 | End: 2021-05-31

## 2021-05-28 ENCOUNTER — OFFICE VISIT (OUTPATIENT)
Dept: UROLOGY | Facility: CLINIC | Age: 81
End: 2021-05-28
Payer: MEDICARE

## 2021-05-28 VITALS
WEIGHT: 190.94 LBS | SYSTOLIC BLOOD PRESSURE: 98 MMHG | RESPIRATION RATE: 18 BRPM | BODY MASS INDEX: 29.97 KG/M2 | HEIGHT: 67 IN | DIASTOLIC BLOOD PRESSURE: 60 MMHG | HEART RATE: 74 BPM

## 2021-05-28 DIAGNOSIS — N40.1 BENIGN PROSTATIC HYPERPLASIA WITH LOWER URINARY TRACT SYMPTOMS, SYMPTOM DETAILS UNSPECIFIED: ICD-10-CM

## 2021-05-28 DIAGNOSIS — N13.8 BPH WITH URINARY OBSTRUCTION: Primary | ICD-10-CM

## 2021-05-28 DIAGNOSIS — N40.1 BPH WITH URINARY OBSTRUCTION: Primary | ICD-10-CM

## 2021-05-28 LAB — POC RESIDUAL URINE VOLUME: 57 ML (ref 0–100)

## 2021-05-28 PROCEDURE — 99999 PR PBB SHADOW E&M-EST. PATIENT-LVL IV: CPT | Mod: PBBFAC,,, | Performed by: NURSE PRACTITIONER

## 2021-05-28 PROCEDURE — 51798 PR MEAS,POST-VOID RES,US,NON-IMAGING: ICD-10-PCS | Mod: S$GLB,,, | Performed by: NURSE PRACTITIONER

## 2021-05-28 PROCEDURE — 1159F MED LIST DOCD IN RCRD: CPT | Mod: S$GLB,,, | Performed by: NURSE PRACTITIONER

## 2021-05-28 PROCEDURE — 99214 OFFICE O/P EST MOD 30 MIN: CPT | Mod: S$GLB,,, | Performed by: NURSE PRACTITIONER

## 2021-05-28 PROCEDURE — 1101F PR PT FALLS ASSESS DOC 0-1 FALLS W/OUT INJ PAST YR: ICD-10-PCS | Mod: CPTII,S$GLB,, | Performed by: NURSE PRACTITIONER

## 2021-05-28 PROCEDURE — 1101F PT FALLS ASSESS-DOCD LE1/YR: CPT | Mod: CPTII,S$GLB,, | Performed by: NURSE PRACTITIONER

## 2021-05-28 PROCEDURE — 1126F AMNT PAIN NOTED NONE PRSNT: CPT | Mod: S$GLB,,, | Performed by: NURSE PRACTITIONER

## 2021-05-28 PROCEDURE — 1126F PR PAIN SEVERITY QUANTIFIED, NO PAIN PRESENT: ICD-10-PCS | Mod: S$GLB,,, | Performed by: NURSE PRACTITIONER

## 2021-05-28 PROCEDURE — 51798 US URINE CAPACITY MEASURE: CPT | Mod: S$GLB,,, | Performed by: NURSE PRACTITIONER

## 2021-05-28 PROCEDURE — 99214 PR OFFICE/OUTPT VISIT, EST, LEVL IV, 30-39 MIN: ICD-10-PCS | Mod: S$GLB,,, | Performed by: NURSE PRACTITIONER

## 2021-05-28 PROCEDURE — 3288F FALL RISK ASSESSMENT DOCD: CPT | Mod: CPTII,S$GLB,, | Performed by: NURSE PRACTITIONER

## 2021-05-28 PROCEDURE — 1159F PR MEDICATION LIST DOCUMENTED IN MEDICAL RECORD: ICD-10-PCS | Mod: S$GLB,,, | Performed by: NURSE PRACTITIONER

## 2021-05-28 PROCEDURE — 99999 PR PBB SHADOW E&M-EST. PATIENT-LVL IV: ICD-10-PCS | Mod: PBBFAC,,, | Performed by: NURSE PRACTITIONER

## 2021-05-28 PROCEDURE — 3288F PR FALLS RISK ASSESSMENT DOCUMENTED: ICD-10-PCS | Mod: CPTII,S$GLB,, | Performed by: NURSE PRACTITIONER

## 2021-05-28 RX ORDER — FINASTERIDE 5 MG/1
5 TABLET, FILM COATED ORAL DAILY
Qty: 90 TABLET | Refills: 6 | Status: SHIPPED | OUTPATIENT
Start: 2021-05-28 | End: 2021-10-25 | Stop reason: SDUPTHER

## 2021-06-04 ENCOUNTER — OFFICE VISIT (OUTPATIENT)
Dept: URGENT CARE | Facility: CLINIC | Age: 81
End: 2021-06-04
Payer: MEDICARE

## 2021-06-04 VITALS
SYSTOLIC BLOOD PRESSURE: 152 MMHG | OXYGEN SATURATION: 92 % | DIASTOLIC BLOOD PRESSURE: 56 MMHG | RESPIRATION RATE: 18 BRPM | TEMPERATURE: 98 F | HEART RATE: 70 BPM

## 2021-06-04 DIAGNOSIS — Z48.02 ENCOUNTER FOR REMOVAL OF SUTURES: Primary | ICD-10-CM

## 2021-06-04 PROCEDURE — 99213 OFFICE O/P EST LOW 20 MIN: CPT | Mod: 25,S$GLB,, | Performed by: NURSE PRACTITIONER

## 2021-06-04 PROCEDURE — 99213 PR OFFICE/OUTPT VISIT, EST, LEVL III, 20-29 MIN: ICD-10-PCS | Mod: 25,S$GLB,, | Performed by: NURSE PRACTITIONER

## 2021-07-20 DIAGNOSIS — E78.2 MIXED HYPERLIPIDEMIA: Primary | ICD-10-CM

## 2021-07-20 RX ORDER — ROSUVASTATIN CALCIUM 10 MG/1
10 TABLET, COATED ORAL DAILY
Qty: 90 TABLET | Refills: 3 | Status: SHIPPED | OUTPATIENT
Start: 2021-07-20 | End: 2022-08-15

## 2021-09-04 LAB
BUN SERPL-MCNC: 24 MG/DL (ref 7–25)
BUN/CREAT SERPL: 19 (CALC) (ref 6–22)
CALCIUM SERPL-MCNC: 9.6 MG/DL (ref 8.6–10.3)
CHLORIDE SERPL-SCNC: 107 MMOL/L (ref 98–110)
CO2 SERPL-SCNC: 31 MMOL/L (ref 20–32)
CREAT SERPL-MCNC: 1.26 MG/DL (ref 0.7–1.11)
GLUCOSE SERPL-MCNC: 102 MG/DL (ref 65–99)
POTASSIUM SERPL-SCNC: 4.4 MMOL/L (ref 3.5–5.3)
SODIUM SERPL-SCNC: 143 MMOL/L (ref 135–146)

## 2021-09-09 ENCOUNTER — OFFICE VISIT (OUTPATIENT)
Dept: FAMILY MEDICINE | Facility: CLINIC | Age: 81
End: 2021-09-09
Payer: MEDICARE

## 2021-09-09 ENCOUNTER — HOSPITAL ENCOUNTER (OUTPATIENT)
Dept: RADIOLOGY | Facility: HOSPITAL | Age: 81
Discharge: HOME | End: 2021-09-09
Attending: FAMILY MEDICINE
Payer: MEDICARE

## 2021-09-09 VITALS
BODY MASS INDEX: 29.48 KG/M2 | OXYGEN SATURATION: 95 % | WEIGHT: 187.81 LBS | DIASTOLIC BLOOD PRESSURE: 60 MMHG | HEIGHT: 67 IN | TEMPERATURE: 98 F | SYSTOLIC BLOOD PRESSURE: 130 MMHG | HEART RATE: 58 BPM

## 2021-09-09 DIAGNOSIS — J40 BRONCHITIS: ICD-10-CM

## 2021-09-09 DIAGNOSIS — J40 BRONCHITIS: Primary | ICD-10-CM

## 2021-09-09 DIAGNOSIS — Z12.5 SCREENING FOR PROSTATE CANCER: ICD-10-CM

## 2021-09-09 DIAGNOSIS — B35.6 TINEA CRURIS: ICD-10-CM

## 2021-09-09 DIAGNOSIS — I73.9 PERIPHERAL VASCULAR DISEASE: ICD-10-CM

## 2021-09-09 DIAGNOSIS — N18.30 STAGE 3 CHRONIC KIDNEY DISEASE, UNSPECIFIED WHETHER STAGE 3A OR 3B CKD: ICD-10-CM

## 2021-09-09 DIAGNOSIS — E78.5 HYPERLIPIDEMIA, UNSPECIFIED HYPERLIPIDEMIA TYPE: ICD-10-CM

## 2021-09-09 PROCEDURE — 3078F PR MOST RECENT DIASTOLIC BLOOD PRESSURE < 80 MM HG: ICD-10-PCS | Mod: HCNC,CPTII,S$GLB, | Performed by: FAMILY MEDICINE

## 2021-09-09 PROCEDURE — 71046 XR CHEST PA AND LATERAL: ICD-10-PCS | Mod: 26,HCNC,, | Performed by: RADIOLOGY

## 2021-09-09 PROCEDURE — 3075F SYST BP GE 130 - 139MM HG: CPT | Mod: HCNC,CPTII,S$GLB, | Performed by: FAMILY MEDICINE

## 2021-09-09 PROCEDURE — 1160F PR REVIEW ALL MEDS BY PRESCRIBER/CLIN PHARMACIST DOCUMENTED: ICD-10-PCS | Mod: HCNC,CPTII,S$GLB, | Performed by: FAMILY MEDICINE

## 2021-09-09 PROCEDURE — 1126F AMNT PAIN NOTED NONE PRSNT: CPT | Mod: HCNC,CPTII,S$GLB, | Performed by: FAMILY MEDICINE

## 2021-09-09 PROCEDURE — 99499 RISK ADDL DX/OHS AUDIT: ICD-10-PCS | Mod: HCNC,S$GLB,, | Performed by: FAMILY MEDICINE

## 2021-09-09 PROCEDURE — 99999 PR PBB SHADOW E&M-EST. PATIENT-LVL III: CPT | Mod: PBBFAC,,, | Performed by: FAMILY MEDICINE

## 2021-09-09 PROCEDURE — 1159F PR MEDICATION LIST DOCUMENTED IN MEDICAL RECORD: ICD-10-PCS | Mod: HCNC,CPTII,S$GLB, | Performed by: FAMILY MEDICINE

## 2021-09-09 PROCEDURE — 3075F PR MOST RECENT SYSTOLIC BLOOD PRESS GE 130-139MM HG: ICD-10-PCS | Mod: HCNC,CPTII,S$GLB, | Performed by: FAMILY MEDICINE

## 2021-09-09 PROCEDURE — 99999 PR PBB SHADOW E&M-EST. PATIENT-LVL III: ICD-10-PCS | Mod: PBBFAC,,, | Performed by: FAMILY MEDICINE

## 2021-09-09 PROCEDURE — 71046 X-RAY EXAM CHEST 2 VIEWS: CPT | Mod: TC,HCNC,FY

## 2021-09-09 PROCEDURE — 71046 X-RAY EXAM CHEST 2 VIEWS: CPT | Mod: 26,HCNC,, | Performed by: RADIOLOGY

## 2021-09-09 PROCEDURE — 3288F FALL RISK ASSESSMENT DOCD: CPT | Mod: HCNC,CPTII,S$GLB, | Performed by: FAMILY MEDICINE

## 2021-09-09 PROCEDURE — 1159F MED LIST DOCD IN RCRD: CPT | Mod: HCNC,CPTII,S$GLB, | Performed by: FAMILY MEDICINE

## 2021-09-09 PROCEDURE — 1160F RVW MEDS BY RX/DR IN RCRD: CPT | Mod: HCNC,CPTII,S$GLB, | Performed by: FAMILY MEDICINE

## 2021-09-09 PROCEDURE — 99214 PR OFFICE/OUTPT VISIT, EST, LEVL IV, 30-39 MIN: ICD-10-PCS | Mod: HCNC,S$GLB,, | Performed by: FAMILY MEDICINE

## 2021-09-09 PROCEDURE — 3078F DIAST BP <80 MM HG: CPT | Mod: HCNC,CPTII,S$GLB, | Performed by: FAMILY MEDICINE

## 2021-09-09 PROCEDURE — 99499 UNLISTED E&M SERVICE: CPT | Mod: HCNC,S$GLB,, | Performed by: FAMILY MEDICINE

## 2021-09-09 PROCEDURE — 1101F PT FALLS ASSESS-DOCD LE1/YR: CPT | Mod: HCNC,CPTII,S$GLB, | Performed by: FAMILY MEDICINE

## 2021-09-09 PROCEDURE — 1126F PR PAIN SEVERITY QUANTIFIED, NO PAIN PRESENT: ICD-10-PCS | Mod: HCNC,CPTII,S$GLB, | Performed by: FAMILY MEDICINE

## 2021-09-09 PROCEDURE — 99214 OFFICE O/P EST MOD 30 MIN: CPT | Mod: HCNC,S$GLB,, | Performed by: FAMILY MEDICINE

## 2021-09-09 PROCEDURE — 3288F PR FALLS RISK ASSESSMENT DOCUMENTED: ICD-10-PCS | Mod: HCNC,CPTII,S$GLB, | Performed by: FAMILY MEDICINE

## 2021-09-09 PROCEDURE — 1101F PR PT FALLS ASSESS DOC 0-1 FALLS W/OUT INJ PAST YR: ICD-10-PCS | Mod: HCNC,CPTII,S$GLB, | Performed by: FAMILY MEDICINE

## 2021-09-09 RX ORDER — CLOTRIMAZOLE AND BETAMETHASONE DIPROPIONATE 10; .64 MG/G; MG/G
CREAM TOPICAL 2 TIMES DAILY
Qty: 45 G | Refills: 3 | Status: SHIPPED | OUTPATIENT
Start: 2021-09-09 | End: 2021-12-23 | Stop reason: SDUPTHER

## 2021-09-09 RX ORDER — ALBUTEROL SULFATE 90 UG/1
2 AEROSOL, METERED RESPIRATORY (INHALATION) 4 TIMES DAILY
Qty: 18 G | Refills: 1 | Status: SHIPPED | OUTPATIENT
Start: 2021-09-09 | End: 2023-08-28

## 2021-09-09 RX ORDER — FLUCONAZOLE 150 MG/1
TABLET ORAL
Qty: 2 TABLET | Refills: 0 | Status: SHIPPED | OUTPATIENT
Start: 2021-09-09 | End: 2021-12-07 | Stop reason: SDUPTHER

## 2021-09-09 RX ORDER — DOXYCYCLINE 100 MG/1
100 CAPSULE ORAL 2 TIMES DAILY
Qty: 20 CAPSULE | Refills: 0 | Status: SHIPPED | OUTPATIENT
Start: 2021-09-09 | End: 2021-12-07 | Stop reason: SDUPTHER

## 2021-10-22 ENCOUNTER — TELEPHONE (OUTPATIENT)
Dept: UROLOGY | Facility: CLINIC | Age: 81
End: 2021-10-22
Payer: MEDICARE

## 2021-10-25 RX ORDER — FINASTERIDE 5 MG/1
5 TABLET, FILM COATED ORAL DAILY
Qty: 90 TABLET | Refills: 6 | Status: SHIPPED | OUTPATIENT
Start: 2021-10-25 | End: 2022-09-12 | Stop reason: SDUPTHER

## 2021-12-07 ENCOUNTER — OFFICE VISIT (OUTPATIENT)
Dept: FAMILY MEDICINE | Facility: CLINIC | Age: 81
End: 2021-12-07
Payer: MEDICARE

## 2021-12-07 VITALS
HEART RATE: 69 BPM | OXYGEN SATURATION: 96 % | TEMPERATURE: 98 F | DIASTOLIC BLOOD PRESSURE: 72 MMHG | SYSTOLIC BLOOD PRESSURE: 116 MMHG | HEIGHT: 67 IN | WEIGHT: 184 LBS | BODY MASS INDEX: 28.88 KG/M2

## 2021-12-07 DIAGNOSIS — I73.9 PERIPHERAL VASCULAR DISEASE: ICD-10-CM

## 2021-12-07 DIAGNOSIS — D64.9 ANEMIA, UNSPECIFIED TYPE: Primary | ICD-10-CM

## 2021-12-07 DIAGNOSIS — N18.30 STAGE 3 CHRONIC KIDNEY DISEASE, UNSPECIFIED WHETHER STAGE 3A OR 3B CKD: ICD-10-CM

## 2021-12-07 DIAGNOSIS — E03.9 HYPOTHYROIDISM, UNSPECIFIED TYPE: ICD-10-CM

## 2021-12-07 DIAGNOSIS — Z98.890 HISTORY OF AAA (ABDOMINAL AORTIC ANEURYSM) REPAIR: ICD-10-CM

## 2021-12-07 DIAGNOSIS — Z79.899 ENCOUNTER FOR LONG-TERM (CURRENT) USE OF OTHER MEDICATIONS: ICD-10-CM

## 2021-12-07 DIAGNOSIS — B35.6 TINEA CRURIS: ICD-10-CM

## 2021-12-07 DIAGNOSIS — R73.9 HYPERGLYCEMIA: ICD-10-CM

## 2021-12-07 DIAGNOSIS — N40.1 BPH WITH URINARY OBSTRUCTION: ICD-10-CM

## 2021-12-07 DIAGNOSIS — N13.8 BPH WITH URINARY OBSTRUCTION: ICD-10-CM

## 2021-12-07 LAB
ALBUMIN SERPL BCP-MCNC: 4 G/DL (ref 3.5–5.2)
ALP SERPL-CCNC: 55 U/L (ref 55–135)
ALT SERPL W/O P-5'-P-CCNC: 15 U/L (ref 10–44)
ANION GAP SERPL CALC-SCNC: 10 MMOL/L (ref 8–16)
AST SERPL-CCNC: 13 U/L (ref 10–40)
BASOPHILS # BLD AUTO: 0.05 K/UL (ref 0–0.2)
BASOPHILS NFR BLD: 0.6 % (ref 0–1.9)
BILIRUB SERPL-MCNC: 0.4 MG/DL (ref 0.1–1)
BUN SERPL-MCNC: 26 MG/DL (ref 8–23)
CALCIUM SERPL-MCNC: 9.8 MG/DL (ref 8.7–10.5)
CHLORIDE SERPL-SCNC: 109 MMOL/L (ref 95–110)
CO2 SERPL-SCNC: 27 MMOL/L (ref 23–29)
CREAT SERPL-MCNC: 1.4 MG/DL (ref 0.5–1.4)
DIFFERENTIAL METHOD: ABNORMAL
EOSINOPHIL # BLD AUTO: 0.3 K/UL (ref 0–0.5)
EOSINOPHIL NFR BLD: 2.9 % (ref 0–8)
ERYTHROCYTE [DISTWIDTH] IN BLOOD BY AUTOMATED COUNT: 12.9 % (ref 11.5–14.5)
EST. GFR  (AFRICAN AMERICAN): 54 ML/MIN/1.73 M^2
EST. GFR  (NON AFRICAN AMERICAN): 47 ML/MIN/1.73 M^2
ESTIMATED AVG GLUCOSE: 114 MG/DL (ref 68–131)
GLUCOSE SERPL-MCNC: 109 MG/DL (ref 70–110)
HBA1C MFR BLD: 5.6 % (ref 4–5.6)
HCT VFR BLD AUTO: 47.9 % (ref 40–54)
HGB BLD-MCNC: 15 G/DL (ref 14–18)
IMM GRANULOCYTES # BLD AUTO: 0.05 K/UL (ref 0–0.04)
IMM GRANULOCYTES NFR BLD AUTO: 0.6 % (ref 0–0.5)
LYMPHOCYTES # BLD AUTO: 2 K/UL (ref 1–4.8)
LYMPHOCYTES NFR BLD: 21.9 % (ref 18–48)
MCH RBC QN AUTO: 27 PG (ref 27–31)
MCHC RBC AUTO-ENTMCNC: 31.3 G/DL (ref 32–36)
MCV RBC AUTO: 86 FL (ref 82–98)
MONOCYTES # BLD AUTO: 0.9 K/UL (ref 0.3–1)
MONOCYTES NFR BLD: 10 % (ref 4–15)
NEUTROPHILS # BLD AUTO: 5.7 K/UL (ref 1.8–7.7)
NEUTROPHILS NFR BLD: 64 % (ref 38–73)
NRBC BLD-RTO: 0 /100 WBC
PLATELET # BLD AUTO: 238 K/UL (ref 150–450)
PMV BLD AUTO: 10.1 FL (ref 9.2–12.9)
POTASSIUM SERPL-SCNC: 4.4 MMOL/L (ref 3.5–5.1)
PROT SERPL-MCNC: 6.5 G/DL (ref 6–8.4)
RBC # BLD AUTO: 5.56 M/UL (ref 4.6–6.2)
SODIUM SERPL-SCNC: 146 MMOL/L (ref 136–145)
TSH SERPL DL<=0.005 MIU/L-ACNC: 2.65 UIU/ML (ref 0.4–4)
WBC # BLD AUTO: 8.93 K/UL (ref 3.9–12.7)

## 2021-12-07 PROCEDURE — 99204 PR OFFICE/OUTPT VISIT, NEW, LEVL IV, 45-59 MIN: ICD-10-PCS | Mod: S$GLB,,, | Performed by: INTERNAL MEDICINE

## 2021-12-07 PROCEDURE — 84443 ASSAY THYROID STIM HORMONE: CPT | Performed by: INTERNAL MEDICINE

## 2021-12-07 PROCEDURE — 99204 OFFICE O/P NEW MOD 45 MIN: CPT | Mod: S$GLB,,, | Performed by: INTERNAL MEDICINE

## 2021-12-07 PROCEDURE — 80053 COMPREHEN METABOLIC PANEL: CPT | Performed by: INTERNAL MEDICINE

## 2021-12-07 PROCEDURE — 85025 COMPLETE CBC W/AUTO DIFF WBC: CPT | Performed by: INTERNAL MEDICINE

## 2021-12-07 PROCEDURE — 83036 HEMOGLOBIN GLYCOSYLATED A1C: CPT | Performed by: INTERNAL MEDICINE

## 2021-12-07 RX ORDER — NAPROXEN SODIUM 220 MG/1
TABLET, FILM COATED ORAL
COMMUNITY
End: 2024-03-07

## 2021-12-07 RX ORDER — KETOROLAC TROMETHAMINE 5 MG/ML
SOLUTION OPHTHALMIC
COMMUNITY
Start: 2021-07-21

## 2021-12-07 RX ORDER — CICLOPIROX 80 MG/ML
SOLUTION TOPICAL
COMMUNITY
Start: 2021-06-28

## 2021-12-21 ENCOUNTER — OFFICE VISIT (OUTPATIENT)
Dept: FAMILY MEDICINE | Facility: CLINIC | Age: 81
End: 2021-12-21
Payer: MEDICARE

## 2021-12-21 VITALS
HEART RATE: 70 BPM | SYSTOLIC BLOOD PRESSURE: 102 MMHG | OXYGEN SATURATION: 95 % | TEMPERATURE: 98 F | DIASTOLIC BLOOD PRESSURE: 56 MMHG | BODY MASS INDEX: 27.62 KG/M2 | HEIGHT: 67 IN | WEIGHT: 176 LBS | RESPIRATION RATE: 18 BRPM

## 2021-12-21 DIAGNOSIS — B35.6 TINEA CRURIS: ICD-10-CM

## 2021-12-21 DIAGNOSIS — E78.00 HYPERCHOLESTEROLEMIA: ICD-10-CM

## 2021-12-21 DIAGNOSIS — I73.9 PERIPHERAL VASCULAR DISEASE: ICD-10-CM

## 2021-12-21 DIAGNOSIS — Z98.890 HISTORY OF AAA (ABDOMINAL AORTIC ANEURYSM) REPAIR: ICD-10-CM

## 2021-12-21 DIAGNOSIS — E87.0 HYPERNATREMIA: ICD-10-CM

## 2021-12-21 DIAGNOSIS — I95.2 HYPOTENSION DUE TO DRUGS: Primary | ICD-10-CM

## 2021-12-21 PROCEDURE — 99213 PR OFFICE/OUTPT VISIT, EST, LEVL III, 20-29 MIN: ICD-10-PCS | Mod: S$GLB,,, | Performed by: INTERNAL MEDICINE

## 2021-12-21 PROCEDURE — 99213 OFFICE O/P EST LOW 20 MIN: CPT | Mod: S$GLB,,, | Performed by: INTERNAL MEDICINE

## 2021-12-23 RX ORDER — CLOTRIMAZOLE AND BETAMETHASONE DIPROPIONATE 10; .64 MG/G; MG/G
CREAM TOPICAL 2 TIMES DAILY
Qty: 45 G | Refills: 3 | Status: SHIPPED | OUTPATIENT
Start: 2021-12-23 | End: 2023-02-22

## 2021-12-29 ENCOUNTER — OFFICE VISIT (OUTPATIENT)
Dept: FAMILY MEDICINE | Facility: CLINIC | Age: 81
End: 2021-12-29
Payer: MEDICARE

## 2021-12-29 VITALS
HEIGHT: 67 IN | WEIGHT: 183 LBS | OXYGEN SATURATION: 96 % | TEMPERATURE: 98 F | BODY MASS INDEX: 28.72 KG/M2 | HEART RATE: 77 BPM | DIASTOLIC BLOOD PRESSURE: 62 MMHG | SYSTOLIC BLOOD PRESSURE: 100 MMHG

## 2021-12-29 DIAGNOSIS — E87.0 HYPERNATREMIA: ICD-10-CM

## 2021-12-29 DIAGNOSIS — N40.1 BPH WITH URINARY OBSTRUCTION: ICD-10-CM

## 2021-12-29 DIAGNOSIS — Z98.890 HISTORY OF AAA (ABDOMINAL AORTIC ANEURYSM) REPAIR: ICD-10-CM

## 2021-12-29 DIAGNOSIS — I73.9 PERIPHERAL VASCULAR DISEASE: ICD-10-CM

## 2021-12-29 DIAGNOSIS — H35.3112 NONEXUDATIVE AGE-RELATED MACULAR DEGENERATION, RIGHT EYE, INTERMEDIATE DRY STAGE: ICD-10-CM

## 2021-12-29 DIAGNOSIS — N13.8 BPH WITH URINARY OBSTRUCTION: ICD-10-CM

## 2021-12-29 DIAGNOSIS — I95.2 HYPOTENSION DUE TO DRUGS: Primary | ICD-10-CM

## 2021-12-29 PROBLEM — N18.31 STAGE 3A CHRONIC KIDNEY DISEASE: Status: RESOLVED | Noted: 2021-01-26 | Resolved: 2021-12-29

## 2021-12-29 PROCEDURE — 99213 PR OFFICE/OUTPT VISIT, EST, LEVL III, 20-29 MIN: ICD-10-PCS | Mod: S$GLB,,, | Performed by: INTERNAL MEDICINE

## 2021-12-29 PROCEDURE — 3078F DIAST BP <80 MM HG: CPT | Mod: CPTII,S$GLB,, | Performed by: INTERNAL MEDICINE

## 2021-12-29 PROCEDURE — 99213 OFFICE O/P EST LOW 20 MIN: CPT | Mod: S$GLB,,, | Performed by: INTERNAL MEDICINE

## 2021-12-29 PROCEDURE — 1126F AMNT PAIN NOTED NONE PRSNT: CPT | Mod: CPTII,S$GLB,, | Performed by: INTERNAL MEDICINE

## 2021-12-29 PROCEDURE — 3074F SYST BP LT 130 MM HG: CPT | Mod: CPTII,S$GLB,, | Performed by: INTERNAL MEDICINE

## 2021-12-29 PROCEDURE — 1126F PR PAIN SEVERITY QUANTIFIED, NO PAIN PRESENT: ICD-10-PCS | Mod: CPTII,S$GLB,, | Performed by: INTERNAL MEDICINE

## 2021-12-29 PROCEDURE — 1159F PR MEDICATION LIST DOCUMENTED IN MEDICAL RECORD: ICD-10-PCS | Mod: CPTII,S$GLB,, | Performed by: INTERNAL MEDICINE

## 2021-12-29 PROCEDURE — 1159F MED LIST DOCD IN RCRD: CPT | Mod: CPTII,S$GLB,, | Performed by: INTERNAL MEDICINE

## 2021-12-29 PROCEDURE — 1101F PT FALLS ASSESS-DOCD LE1/YR: CPT | Mod: CPTII,S$GLB,, | Performed by: INTERNAL MEDICINE

## 2021-12-29 PROCEDURE — 3078F PR MOST RECENT DIASTOLIC BLOOD PRESSURE < 80 MM HG: ICD-10-PCS | Mod: CPTII,S$GLB,, | Performed by: INTERNAL MEDICINE

## 2021-12-29 PROCEDURE — 3288F FALL RISK ASSESSMENT DOCD: CPT | Mod: CPTII,S$GLB,, | Performed by: INTERNAL MEDICINE

## 2021-12-29 PROCEDURE — 3288F PR FALLS RISK ASSESSMENT DOCUMENTED: ICD-10-PCS | Mod: CPTII,S$GLB,, | Performed by: INTERNAL MEDICINE

## 2021-12-29 PROCEDURE — 1101F PR PT FALLS ASSESS DOC 0-1 FALLS W/OUT INJ PAST YR: ICD-10-PCS | Mod: CPTII,S$GLB,, | Performed by: INTERNAL MEDICINE

## 2021-12-29 PROCEDURE — 3074F PR MOST RECENT SYSTOLIC BLOOD PRESSURE < 130 MM HG: ICD-10-PCS | Mod: CPTII,S$GLB,, | Performed by: INTERNAL MEDICINE

## 2022-01-18 ENCOUNTER — OFFICE VISIT (OUTPATIENT)
Dept: FAMILY MEDICINE | Facility: CLINIC | Age: 82
End: 2022-01-18
Payer: MEDICARE

## 2022-01-18 VITALS
HEIGHT: 67 IN | SYSTOLIC BLOOD PRESSURE: 90 MMHG | TEMPERATURE: 98 F | HEART RATE: 82 BPM | OXYGEN SATURATION: 95 % | WEIGHT: 186 LBS | BODY MASS INDEX: 29.19 KG/M2 | DIASTOLIC BLOOD PRESSURE: 68 MMHG

## 2022-01-18 DIAGNOSIS — H35.3221 EXUDATIVE AGE-RELATED MACULAR DEGENERATION, LEFT EYE, WITH ACTIVE CHOROIDAL NEOVASCULARIZATION: ICD-10-CM

## 2022-01-18 DIAGNOSIS — I73.9 PERIPHERAL VASCULAR DISEASE: ICD-10-CM

## 2022-01-18 DIAGNOSIS — I95.2 HYPOTENSION DUE TO DRUGS: Primary | ICD-10-CM

## 2022-01-18 PROCEDURE — 3288F PR FALLS RISK ASSESSMENT DOCUMENTED: ICD-10-PCS | Mod: CPTII,S$GLB,, | Performed by: INTERNAL MEDICINE

## 2022-01-18 PROCEDURE — 1100F PTFALLS ASSESS-DOCD GE2>/YR: CPT | Mod: CPTII,S$GLB,, | Performed by: INTERNAL MEDICINE

## 2022-01-18 PROCEDURE — 3078F DIAST BP <80 MM HG: CPT | Mod: CPTII,S$GLB,, | Performed by: INTERNAL MEDICINE

## 2022-01-18 PROCEDURE — 99212 OFFICE O/P EST SF 10 MIN: CPT | Mod: S$GLB,,, | Performed by: INTERNAL MEDICINE

## 2022-01-18 PROCEDURE — 1125F PR PAIN SEVERITY QUANTIFIED, PAIN PRESENT: ICD-10-PCS | Mod: CPTII,S$GLB,, | Performed by: INTERNAL MEDICINE

## 2022-01-18 PROCEDURE — 3074F SYST BP LT 130 MM HG: CPT | Mod: CPTII,S$GLB,, | Performed by: INTERNAL MEDICINE

## 2022-01-18 PROCEDURE — 3288F FALL RISK ASSESSMENT DOCD: CPT | Mod: CPTII,S$GLB,, | Performed by: INTERNAL MEDICINE

## 2022-01-18 PROCEDURE — 1159F MED LIST DOCD IN RCRD: CPT | Mod: CPTII,S$GLB,, | Performed by: INTERNAL MEDICINE

## 2022-01-18 PROCEDURE — 1125F AMNT PAIN NOTED PAIN PRSNT: CPT | Mod: CPTII,S$GLB,, | Performed by: INTERNAL MEDICINE

## 2022-01-18 PROCEDURE — 99212 PR OFFICE/OUTPT VISIT, EST, LEVL II, 10-19 MIN: ICD-10-PCS | Mod: S$GLB,,, | Performed by: INTERNAL MEDICINE

## 2022-01-18 PROCEDURE — 1100F PR PT FALLS ASSESS DOC 2+ FALLS/FALL W/INJURY/YR: ICD-10-PCS | Mod: CPTII,S$GLB,, | Performed by: INTERNAL MEDICINE

## 2022-01-18 PROCEDURE — 1159F PR MEDICATION LIST DOCUMENTED IN MEDICAL RECORD: ICD-10-PCS | Mod: CPTII,S$GLB,, | Performed by: INTERNAL MEDICINE

## 2022-01-18 PROCEDURE — 3078F PR MOST RECENT DIASTOLIC BLOOD PRESSURE < 80 MM HG: ICD-10-PCS | Mod: CPTII,S$GLB,, | Performed by: INTERNAL MEDICINE

## 2022-01-18 PROCEDURE — 3074F PR MOST RECENT SYSTOLIC BLOOD PRESSURE < 130 MM HG: ICD-10-PCS | Mod: CPTII,S$GLB,, | Performed by: INTERNAL MEDICINE

## 2022-01-18 NOTE — PROGRESS NOTES
"Subjective:       Patient ID: Giovanni Zavaleta Sr. is a 81 y.o. male.    Chief Complaint: Follow-up (No headaches or hot flashes occurred besides yesterday. PT describes the feeling of a "heatwave" starting to increase in the head. ), Hypertension (Patient states he checks BP routinely at home; 89/57 range normally. 114/88 last pressure reading. ), and Orders (Pt is interested in obtaining a more advanced BP machine/kit for home use. )    This point patient is still experiencing significant hypotension by our measurements on the next step will be to decrease the Flomax down to 0.4 mg at bedtime.  He has been taking 0.8 mg at bedtime as per recommendations from his urologist.    With regards to the rest of the workup he will be having a colonoscopy by Dr. Eldridge next Wednesday January 26. Patient still experiencing the heat wave sensation in his head on an off reason why we were thinking that he could be related to a serotonin producing tumor doses a very low probability it is a possibility.    Lab work from December was reviewed again and there is no need for repeat.    I have recommended for them to obtain and [employer] brand digital sphygmomanometer to be use in the arm not in the wrist area.  They are welcome to bring by the office so we can compare it to ours with regards to reliability and accuracy.        Review of Systems   All other systems reviewed and are negative.        Objective:      Physical Exam  Vitals and nursing note reviewed. Exam conducted with a chaperone present.   Constitutional:       Appearance: Normal appearance.   Neurological:      Mental Status: He is alert and oriented to person, place, and time. Mental status is at baseline.         Assessment:       Problem List Items Addressed This Visit        Cardiac/Vascular    Peripheral vascular disease      Other Visit Diagnoses     Hypotension due to drugs    -  Primary    Exudative age-related macular degeneration, left eye, with active " choroidal neovascularization              Plan:       As per HPI patient will have his tamsulosin decreased to 0.4 mg at bedtime instead of the 0.8 that he has been taking on he can return to the office next week for blood pressure checks to see how this impacts his blood pressure.  I do not believe that is needed at this time for him to undergo a tilt-table test or any other referral to subspecialty.  He is otherwise asymptomatic and not being hurt by this.

## 2022-03-15 ENCOUNTER — OFFICE VISIT (OUTPATIENT)
Dept: FAMILY MEDICINE | Facility: CLINIC | Age: 82
End: 2022-03-15
Payer: MEDICARE

## 2022-03-15 VITALS
HEIGHT: 67 IN | WEIGHT: 189 LBS | DIASTOLIC BLOOD PRESSURE: 78 MMHG | SYSTOLIC BLOOD PRESSURE: 132 MMHG | TEMPERATURE: 98 F | BODY MASS INDEX: 29.66 KG/M2 | OXYGEN SATURATION: 95 % | HEART RATE: 71 BPM

## 2022-03-15 DIAGNOSIS — Z98.890 HISTORY OF AAA (ABDOMINAL AORTIC ANEURYSM) REPAIR: ICD-10-CM

## 2022-03-15 DIAGNOSIS — L73.9 FOLLICULITIS: ICD-10-CM

## 2022-03-15 DIAGNOSIS — I95.2 HYPOTENSION DUE TO DRUGS: Primary | ICD-10-CM

## 2022-03-15 DIAGNOSIS — N40.1 BPH WITH URINARY OBSTRUCTION: ICD-10-CM

## 2022-03-15 DIAGNOSIS — N13.8 BPH WITH URINARY OBSTRUCTION: ICD-10-CM

## 2022-03-15 DIAGNOSIS — H35.3112 NONEXUDATIVE AGE-RELATED MACULAR DEGENERATION, RIGHT EYE, INTERMEDIATE DRY STAGE: ICD-10-CM

## 2022-03-15 PROCEDURE — 3078F PR MOST RECENT DIASTOLIC BLOOD PRESSURE < 80 MM HG: ICD-10-PCS | Mod: CPTII,S$GLB,, | Performed by: INTERNAL MEDICINE

## 2022-03-15 PROCEDURE — 1126F PR PAIN SEVERITY QUANTIFIED, NO PAIN PRESENT: ICD-10-PCS | Mod: CPTII,S$GLB,, | Performed by: INTERNAL MEDICINE

## 2022-03-15 PROCEDURE — 1159F MED LIST DOCD IN RCRD: CPT | Mod: CPTII,S$GLB,, | Performed by: INTERNAL MEDICINE

## 2022-03-15 PROCEDURE — 1126F AMNT PAIN NOTED NONE PRSNT: CPT | Mod: CPTII,S$GLB,, | Performed by: INTERNAL MEDICINE

## 2022-03-15 PROCEDURE — 3078F DIAST BP <80 MM HG: CPT | Mod: CPTII,S$GLB,, | Performed by: INTERNAL MEDICINE

## 2022-03-15 PROCEDURE — 1159F PR MEDICATION LIST DOCUMENTED IN MEDICAL RECORD: ICD-10-PCS | Mod: CPTII,S$GLB,, | Performed by: INTERNAL MEDICINE

## 2022-03-15 PROCEDURE — 99213 OFFICE O/P EST LOW 20 MIN: CPT | Mod: S$GLB,,, | Performed by: INTERNAL MEDICINE

## 2022-03-15 PROCEDURE — 1101F PT FALLS ASSESS-DOCD LE1/YR: CPT | Mod: CPTII,S$GLB,, | Performed by: INTERNAL MEDICINE

## 2022-03-15 PROCEDURE — 1101F PR PT FALLS ASSESS DOC 0-1 FALLS W/OUT INJ PAST YR: ICD-10-PCS | Mod: CPTII,S$GLB,, | Performed by: INTERNAL MEDICINE

## 2022-03-15 PROCEDURE — 3288F PR FALLS RISK ASSESSMENT DOCUMENTED: ICD-10-PCS | Mod: CPTII,S$GLB,, | Performed by: INTERNAL MEDICINE

## 2022-03-15 PROCEDURE — 99213 PR OFFICE/OUTPT VISIT, EST, LEVL III, 20-29 MIN: ICD-10-PCS | Mod: S$GLB,,, | Performed by: INTERNAL MEDICINE

## 2022-03-15 PROCEDURE — 3075F SYST BP GE 130 - 139MM HG: CPT | Mod: CPTII,S$GLB,, | Performed by: INTERNAL MEDICINE

## 2022-03-15 PROCEDURE — 3288F FALL RISK ASSESSMENT DOCD: CPT | Mod: CPTII,S$GLB,, | Performed by: INTERNAL MEDICINE

## 2022-03-15 PROCEDURE — 3075F PR MOST RECENT SYSTOLIC BLOOD PRESS GE 130-139MM HG: ICD-10-PCS | Mod: CPTII,S$GLB,, | Performed by: INTERNAL MEDICINE

## 2022-03-15 NOTE — PROGRESS NOTES
Subjective:       Patient ID: Giovanni Zavaleta Sr. is a 81 y.o. male.    Chief Complaint: Hypertension (Patient states BP has been eratic and fluctuating. One day is high and one day could be low. Patient does not check BP routinely every day at home but does make effort to monitor it. )    Patient returns for follow-up of his hypotension management there has been significant improvement in his blood pressure after modification of the therapy to now only be including Flomax 0.4 milligrams at bedtime for his BPH and no medication specifically for hypertension.    Review of Systems      Objective:      Physical Exam    Assessment:       Problem List Items Addressed This Visit        Ophtho    Nonexudative age-related macular degeneration, right eye, intermediate dry stage       Cardiac/Vascular    History of AAA (abdominal aortic aneurysm) repair       Renal/    BPH with urinary obstruction      Other Visit Diagnoses     Hypotension due to drugs    -  Primary    Folliculitis              Plan:       Patient's hypotension has resolved after the modification of his therapy and decreasing his Flomax to 0.4 mg at bedtime.  There has been no worsening of his benign prostatic hypertrophy symptomatology so plans are for the treatment to continue as is.  He is welcome to come by the office during office hours for continued blood pressure checked otherwise he will follow-up at the office in 4 months.    Patient has been instructed to try the Balmex ointment over-the-counter for the scrotal discomfort    His follow-up eye examination for his macular degeneration is due tomorrow. Dr Garcia    Medication list was reviewed and there is no need for any other refills.    He can continue applying mupirocin ointment to the affected area on his buttock since he seems to be resolving the problem.    With regards to the care gaps patient has had the COVID vaccination including the booster at the local [facility] pharmacy so this information  is to be updated.

## 2022-04-20 ENCOUNTER — PES CALL (OUTPATIENT)
Dept: ADMINISTRATIVE | Facility: CLINIC | Age: 82
End: 2022-04-20
Payer: MEDICARE

## 2022-04-25 ENCOUNTER — OFFICE VISIT (OUTPATIENT)
Dept: FAMILY MEDICINE | Facility: CLINIC | Age: 82
End: 2022-04-25
Payer: MEDICARE

## 2022-04-25 VITALS
TEMPERATURE: 98 F | HEIGHT: 67 IN | HEART RATE: 65 BPM | DIASTOLIC BLOOD PRESSURE: 70 MMHG | SYSTOLIC BLOOD PRESSURE: 106 MMHG | WEIGHT: 184 LBS | OXYGEN SATURATION: 94 % | BODY MASS INDEX: 28.88 KG/M2

## 2022-04-25 DIAGNOSIS — Z91.81 AT HIGH RISK FOR INJURY RELATED TO FALL: ICD-10-CM

## 2022-04-25 DIAGNOSIS — I73.9 PERIPHERAL VASCULAR DISEASE: ICD-10-CM

## 2022-04-25 DIAGNOSIS — I95.2 HYPOTENSION DUE TO DRUGS: Primary | ICD-10-CM

## 2022-04-25 DIAGNOSIS — Z98.890 HISTORY OF AAA (ABDOMINAL AORTIC ANEURYSM) REPAIR: ICD-10-CM

## 2022-04-25 DIAGNOSIS — R26.89 PERIPHERAL NEUROPATHIC GAIT: ICD-10-CM

## 2022-04-25 DIAGNOSIS — R29.898 LEFT LEG WEAKNESS: ICD-10-CM

## 2022-04-25 DIAGNOSIS — E78.00 HYPERCHOLESTEROLEMIA: ICD-10-CM

## 2022-04-25 DIAGNOSIS — H35.3112 NONEXUDATIVE AGE-RELATED MACULAR DEGENERATION, RIGHT EYE, INTERMEDIATE DRY STAGE: ICD-10-CM

## 2022-04-25 PROCEDURE — 1159F PR MEDICATION LIST DOCUMENTED IN MEDICAL RECORD: ICD-10-PCS | Mod: CPTII,S$GLB,, | Performed by: INTERNAL MEDICINE

## 2022-04-25 PROCEDURE — 1101F PT FALLS ASSESS-DOCD LE1/YR: CPT | Mod: CPTII,S$GLB,, | Performed by: INTERNAL MEDICINE

## 2022-04-25 PROCEDURE — 3288F PR FALLS RISK ASSESSMENT DOCUMENTED: ICD-10-PCS | Mod: CPTII,S$GLB,, | Performed by: INTERNAL MEDICINE

## 2022-04-25 PROCEDURE — 99213 PR OFFICE/OUTPT VISIT, EST, LEVL III, 20-29 MIN: ICD-10-PCS | Mod: S$GLB,,, | Performed by: INTERNAL MEDICINE

## 2022-04-25 PROCEDURE — 3074F SYST BP LT 130 MM HG: CPT | Mod: CPTII,S$GLB,, | Performed by: INTERNAL MEDICINE

## 2022-04-25 PROCEDURE — 3078F DIAST BP <80 MM HG: CPT | Mod: CPTII,S$GLB,, | Performed by: INTERNAL MEDICINE

## 2022-04-25 PROCEDURE — 99213 OFFICE O/P EST LOW 20 MIN: CPT | Mod: S$GLB,,, | Performed by: INTERNAL MEDICINE

## 2022-04-25 PROCEDURE — 3074F PR MOST RECENT SYSTOLIC BLOOD PRESSURE < 130 MM HG: ICD-10-PCS | Mod: CPTII,S$GLB,, | Performed by: INTERNAL MEDICINE

## 2022-04-25 PROCEDURE — 1126F PR PAIN SEVERITY QUANTIFIED, NO PAIN PRESENT: ICD-10-PCS | Mod: CPTII,S$GLB,, | Performed by: INTERNAL MEDICINE

## 2022-04-25 PROCEDURE — 1126F AMNT PAIN NOTED NONE PRSNT: CPT | Mod: CPTII,S$GLB,, | Performed by: INTERNAL MEDICINE

## 2022-04-25 PROCEDURE — 1159F MED LIST DOCD IN RCRD: CPT | Mod: CPTII,S$GLB,, | Performed by: INTERNAL MEDICINE

## 2022-04-25 PROCEDURE — 3288F FALL RISK ASSESSMENT DOCD: CPT | Mod: CPTII,S$GLB,, | Performed by: INTERNAL MEDICINE

## 2022-04-25 PROCEDURE — 3078F PR MOST RECENT DIASTOLIC BLOOD PRESSURE < 80 MM HG: ICD-10-PCS | Mod: CPTII,S$GLB,, | Performed by: INTERNAL MEDICINE

## 2022-04-25 PROCEDURE — 1101F PR PT FALLS ASSESS DOC 0-1 FALLS W/OUT INJ PAST YR: ICD-10-PCS | Mod: CPTII,S$GLB,, | Performed by: INTERNAL MEDICINE

## 2022-04-25 RX ORDER — TRIAMCINOLONE ACETONIDE 1 MG/G
CREAM TOPICAL 2 TIMES DAILY
COMMUNITY

## 2022-04-25 RX ORDER — SULFAMETHOXAZOLE AND TRIMETHOPRIM 200; 40 MG/5ML; MG/5ML
8 SUSPENSION ORAL EVERY 12 HOURS
COMMUNITY
End: 2022-07-14

## 2022-04-25 RX ORDER — HYDROCORTISONE 10 MG/ML
LOTION TOPICAL 2 TIMES DAILY
COMMUNITY

## 2022-04-25 NOTE — PROGRESS NOTES
Subjective:       Patient ID: Giovanni Zavaleta Sr. is a 81 y.o. male.    Chief Complaint: Hypertension (Patient's wife states his BP routinely; states the BP fluctuates from time to time. ), Sinus Problem, Equillibrium (Patient's wife is concerned if his statin medication is causing balance in left leg to be off. ), and Referral (Patient would like to be referred to physical therapy. )    For chief complaint patient has been having issues with blood pressure but being that today is 106/70 with no medications that would have any effect on the blood pressure with had a long discussion again related to orthostatic hypotension, Shy-Drager syndrome and the usual workup and treatments and the preferred not to pursue this any further.  The main concern being that the use of any mineral corticoid therapy to try and improve his symptomatology would actually have more potential side effects and benefits.    With regards to his weakness and occasional locking up of his left leg patient's sound like he has a peripheral neuropathy that will warrant referral to neurologist and they have agreed to be seen by Dr. Fernandez in [facility] and will arrange for this appointment.  They will call with regards to final scheduling details.    I have impressed upon them that I do not believe that this symptomatology related to the left leg has anything to do with his history of peripheral arterial disease.    Also I do not think that this is related to statin therapy being that is not a myalgia and there have been no abnormalities in his liver functions prior.  If he wanted it is safe for him to hold the medication for 5 days, this is specifically the rosuvastatin, and then see how he feels after that.    Vital signs are otherwise stable and there has been no falls.    This an increasing the risk of falls because of his weakness and patient is already using a quad cane for stability.  He thinks that he benefited before from pool exercises when  they use to go to [facility] in [place] but this is to much of a hassle to drive that distance to be able to get that done and I have recommended for them to see if they can join the [facility] program at the local [facility] facility    Medication list was reviewed and there is no need for refills    Review of Systems   All other systems reviewed and are negative.        Objective:      Physical Exam  Vitals and nursing note reviewed. Exam conducted with a chaperone present.   Constitutional:       General: He is not in acute distress.     Appearance: Normal appearance. He is normal weight. He is not ill-appearing, toxic-appearing or diaphoretic.   HENT:      Head: Normocephalic.   Cardiovascular:      Rate and Rhythm: Normal rate and regular rhythm.      Pulses: Normal pulses.   Musculoskeletal:      Right lower leg: Edema present.      Left lower leg: Edema present.      Comments: Plus one above ankles   Skin:     General: Skin is warm and dry.      Capillary Refill: Capillary refill takes 2 to 3 seconds.      Coloration: Skin is not jaundiced or pale.   Neurological:      Mental Status: He is alert.      Sensory: Sensory deficit present.      Motor: Weakness present.      Gait: Gait abnormal.   Psychiatric:         Mood and Affect: Mood normal.         Behavior: Behavior normal.         Thought Content: Thought content normal.         Judgment: Judgment normal.         Assessment:       Problem List Items Addressed This Visit        Ophtho    Nonexudative age-related macular degeneration, right eye, intermediate dry stage       Cardiac/Vascular    History of AAA (abdominal aortic aneurysm) repair    Peripheral vascular disease      Other Visit Diagnoses     Hypotension due to drugs    -  Primary    Hypercholesterolemia        At high risk for injury related to fall        Left leg weakness        Peripheral neuropathic gait              Plan:       As per HPI patient blood pressure is considered to be  appropriate after discontinuing all medications for same and he they prefer not to pursue the issue any further as it regards to orthostatic hypotension and potential forced central dysautonomia.    They will look into being able to join a adult exercise program at the local facility and they are to go by and see the program still available and bring the form so I can not sign them for them.    I have recommended for him to consider stopping the rosuvastatin for 5 days and see if this causes any improvement of his left leg symptomatology.    Patient is to be referred to Dr. Fernandez with regards to left leg neuropathy like symptomatology for further evaluation and therapy.    He is up-to-date with his eye care as it pertains to his severe nonexudative age-related macular degeneration.    Though the mention the fact that he has a history of peripheral arterial disease I do not think that with as severely impaired as his ambulation is that he actually gets to the point of having any significant intermittent claudication.    From my standpoint no further workup was entertained.    Medication list was reviewed and there is no need    Patient is to follow-up in 3 months otherwise.  He is welcome to come by the office during office hours for blood pressure checks.

## 2022-04-26 ENCOUNTER — TELEPHONE (OUTPATIENT)
Dept: FAMILY MEDICINE | Facility: CLINIC | Age: 82
End: 2022-04-26
Payer: MEDICARE

## 2022-05-05 ENCOUNTER — CLINICAL SUPPORT (OUTPATIENT)
Dept: FAMILY MEDICINE | Facility: CLINIC | Age: 82
End: 2022-05-05
Payer: MEDICARE

## 2022-05-05 VITALS
BODY MASS INDEX: 28.88 KG/M2 | RESPIRATION RATE: 18 BRPM | OXYGEN SATURATION: 95 % | HEIGHT: 67 IN | HEART RATE: 66 BPM | DIASTOLIC BLOOD PRESSURE: 60 MMHG | WEIGHT: 184 LBS | SYSTOLIC BLOOD PRESSURE: 124 MMHG

## 2022-05-05 NOTE — PROGRESS NOTES
Giovanni WALLACE Waqar Sr. 81 y.o. male is here today for Blood Pressure check.   History of HTN yes.    Review of patient's allergies indicates:   Allergen Reactions    Pravastatin Rash     Creatinine   Date Value Ref Range Status   12/07/2021 1.4 0.5 - 1.4 mg/dL Final     Sodium   Date Value Ref Range Status   12/07/2021 146 (H) 136 - 145 mmol/L Final     Potassium   Date Value Ref Range Status   12/07/2021 4.4 3.5 - 5.1 mmol/L Final   ]  Patient verifies taking blood pressure medications on a regular basis at the same time of the day.     Current Outpatient Medications:     albuterol (PROVENTIL/VENTOLIN HFA) 90 mcg/actuation inhaler, Inhale 2 puffs into the lungs 4 (four) times daily., Disp: 18 g, Rfl: 1    ascorbic acid, vitamin C, (VITAMIN C) 100 MG tablet, Take 100 mg by mouth once daily., Disp: , Rfl:     aspirin 81 MG Chew, Chew 1 tablet every day by oral route., Disp: , Rfl:     ciclopirox (PENLAC) 8 % Soln, Apply over nail and surrounding skin. Apply daily over previous coat. After seven (7) days, may remove with alcohol and continue cycle., Disp: , Rfl:     clopidogreL (PLAVIX) 75 mg tablet, Take 1 tablet (75 mg total) by mouth once daily., Disp: 30 tablet, Rfl: 0    clotrimazole-betamethasone 1-0.05% (LOTRISONE) cream, Apply topically 2 (two) times daily., Disp: 45 g, Rfl: 3    coenzyme Q10 100 mg capsule, Take 100 mg by mouth once daily., Disp: , Rfl:     ergocalciferol, vitamin D2, (VITAMIN D ORAL), Take by mouth once daily., Disp: , Rfl:     fenofibrate 160 MG Tab, Take 1 tablet (160 mg total) by mouth once daily., Disp: 90 tablet, Rfl: 3    finasteride (PROSCAR) 5 mg tablet, Take 1 tablet (5 mg total) by mouth once daily., Disp: 90 tablet, Rfl: 6    fluticasone propionate (FLONASE) 50 mcg/actuation nasal spray, 2 sprays (100 mcg total) by Each Nostril route once daily at 6am., Disp: 30 g, Rfl: 3    hydrocortisone 1 % lotion, Apply topically 2 (two) times daily., Disp: , Rfl:     ketorolac  0.5% (ACULAR) 0.5 % Drop, INSTILL 1 DROP INTO BOTH EYES 4 TIMES A DAY, Disp: , Rfl:     labetaloL (NORMODYNE) 200 MG tablet, TAKE 1 TABLET EVERY DAY (NEED MD APPOINTMENT FOR REFILLS), Disp: 90 tablet, Rfl: 3    melatonin 3 mg Cap, Take 1 capsule by mouth every evening., Disp: , Rfl:     mupirocin (BACTROBAN) 2 % ointment, Apply topically 2 (two) times daily., Disp: 30 g, Rfl: 0    oxybutynin (DITROPAN) 5 MG Tab, TAKE 1 TABLET EVERY DAY (NEED MD APPOINTMENT FOR REFILLS), Disp: 90 tablet, Rfl: 3    rosuvastatin (CRESTOR) 10 MG tablet, Take 1 tablet (10 mg total) by mouth once daily., Disp: 90 tablet, Rfl: 3    sulfamethoxazole-trimethoprim 200-40 mg/5 ml (BACTRIM,SEPTRA) 200-40 mg/5 mL Susp, Take 8 mg/kg/day by mouth every 12 (twelve) hours., Disp: , Rfl:     tamsulosin (FLOMAX) 0.4 mg Cap, TAKE 2 CAPSULES EVERY DAY (NEED MD APPOINTMENT FOR REFILLS), Disp: 180 capsule, Rfl: 3    triamcinolone acetonide 0.1% (KENALOG) 0.1 % cream, Apply topically 2 (two) times daily., Disp: , Rfl:   Does patient have record of home blood pressure readings yes. Readings have been averaging NA.   Last dose of blood pressure medication was taken at NA.  Patient is asymptomatic.   Complains of NA.    BP: 124/60 , Pulse: 66 .    Blood pressure reading after 15 minutes 128/59, Pulse 66.  Dr. Lozano notified. Per Dr. Lozano, patient instructed to continue to monitor BP at home. Instructed to return to clinic with any concerns regarding BP. Patient's wife verbalized understanding. JAGDISH Quintana

## 2022-05-18 ENCOUNTER — TELEPHONE (OUTPATIENT)
Dept: FAMILY MEDICINE | Facility: CLINIC | Age: 82
End: 2022-05-18
Payer: MEDICARE

## 2022-05-18 NOTE — TELEPHONE ENCOUNTER
Called and spoke to pt's wife about setting up AWV  Declines for now but will call back if changes his mind

## 2022-05-27 ENCOUNTER — OFFICE VISIT (OUTPATIENT)
Dept: URGENT CARE | Facility: CLINIC | Age: 82
End: 2022-05-27
Payer: MEDICARE

## 2022-05-27 VITALS
SYSTOLIC BLOOD PRESSURE: 151 MMHG | HEART RATE: 71 BPM | TEMPERATURE: 98 F | OXYGEN SATURATION: 93 % | DIASTOLIC BLOOD PRESSURE: 73 MMHG | BODY MASS INDEX: 28.19 KG/M2 | RESPIRATION RATE: 14 BRPM | WEIGHT: 180 LBS

## 2022-05-27 DIAGNOSIS — S90.562A INSECT BITE OF LEFT ANKLE, INITIAL ENCOUNTER: ICD-10-CM

## 2022-05-27 DIAGNOSIS — L03.116 CELLULITIS OF LEFT ANKLE: Primary | ICD-10-CM
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
## 2022-05-27 PROCEDURE — 1160F PR REVIEW ALL MEDS BY PRESCRIBER/CLIN PHARMACIST DOCUMENTED: ICD-10-PCS | Mod: CPTII,S$GLB,, | Performed by: STUDENT IN AN ORGANIZED HEALTH CARE EDUCATION/TRAINING PROGRAM

## 2022-05-27 PROCEDURE — 1160F RVW MEDS BY RX/DR IN RCRD: CPT | Mod: CPTII,S$GLB,, | Performed by: STUDENT IN AN ORGANIZED HEALTH CARE EDUCATION/TRAINING PROGRAM

## 2022-05-27 PROCEDURE — 3078F DIAST BP <80 MM HG: CPT | Mod: CPTII,S$GLB,, | Performed by: STUDENT IN AN ORGANIZED HEALTH CARE EDUCATION/TRAINING PROGRAM

## 2022-05-27 PROCEDURE — 3077F PR MOST RECENT SYSTOLIC BLOOD PRESSURE >= 140 MM HG: ICD-10-PCS | Mod: CPTII,S$GLB,, | Performed by: STUDENT IN AN ORGANIZED HEALTH CARE EDUCATION/TRAINING PROGRAM

## 2022-05-27 PROCEDURE — 1159F PR MEDICATION LIST DOCUMENTED IN MEDICAL RECORD: ICD-10-PCS | Mod: CPTII,S$GLB,, | Performed by: STUDENT IN AN ORGANIZED HEALTH CARE EDUCATION/TRAINING PROGRAM

## 2022-05-27 PROCEDURE — 1159F MED LIST DOCD IN RCRD: CPT | Mod: CPTII,S$GLB,, | Performed by: STUDENT IN AN ORGANIZED HEALTH CARE EDUCATION/TRAINING PROGRAM

## 2022-05-27 PROCEDURE — 3078F PR MOST RECENT DIASTOLIC BLOOD PRESSURE < 80 MM HG: ICD-10-PCS | Mod: CPTII,S$GLB,, | Performed by: STUDENT IN AN ORGANIZED HEALTH CARE EDUCATION/TRAINING PROGRAM

## 2022-05-27 PROCEDURE — 3077F SYST BP >= 140 MM HG: CPT | Mod: CPTII,S$GLB,, | Performed by: STUDENT IN AN ORGANIZED HEALTH CARE EDUCATION/TRAINING PROGRAM

## 2022-05-27 PROCEDURE — 99213 OFFICE O/P EST LOW 20 MIN: CPT | Mod: S$GLB,,, | Performed by: STUDENT IN AN ORGANIZED HEALTH CARE EDUCATION/TRAINING PROGRAM

## 2022-05-27 PROCEDURE — 99213 PR OFFICE/OUTPT VISIT, EST, LEVL III, 20-29 MIN: ICD-10-PCS | Mod: S$GLB,,, | Performed by: STUDENT IN AN ORGANIZED HEALTH CARE EDUCATION/TRAINING PROGRAM

## 2022-05-27 RX ORDER — CEPHALEXIN 500 MG/1
500 CAPSULE ORAL 4 TIMES DAILY
Qty: 28 CAPSULE | Refills: 0 | Status: SHIPPED | OUTPATIENT
Start: 2022-05-27 | End: 2022-06-03

## 2022-05-27 NOTE — PROGRESS NOTES
Subjective:       Patient ID: Giovanni Zavaleta Sr. is a 81 y.o. male.    Vitals:  weight is 81.6 kg (180 lb). His temperature is 97.6 °F (36.4 °C). His blood pressure is 151/73 (abnormal) and his pulse is 71. His respiration is 14 and oxygen saturation is 93% (abnormal).     Chief Complaint: Insect Bite    Patient is an 81-year-old male with a past medical history of coronary artery disease, chronic kidney disease, degenerative joint disease, hyperlipidemia, hypertension, peripheral vascular disease, and BPH who presents to clinic via spouse for evaluation of possible insect bite.  Patient reports belief that he was bit by an insect x3 days ago.  Patient states was outside working in his yard and cleaning debris when he started having itching to the outside of his left ankle.  Patient states that he itch the area for some time however later that afternoon or into the next morning his wife noticed his left outside ankle was red and swollen.  Patient states his wife thought there was a puncture wound to the left ankle so believes he may have got bit by a spider or another insect in the debris that washed on to his yard during the past storm.  Patient states his left lateral ankle is swollen red and still has occasional episodes of itching however does not have any pain to the area.  Patient states has also not noticed any drainage to the area.  Patient states has not had any over-the-counter medications for symptoms at this point inferior he would come in and get checked out if he needed antibiotics or not.  Patient denies any acute fever or chills, chest pain or palpitations, shortness of breath or cough, generalized body aches, headaches or dizziness.    Insect Bite  This is a new problem. The current episode started in the past 7 days. The problem has been gradually worsening. Associated symptoms include fatigue (Reports chronic) and joint swelling (Left ankle). Pertinent negatives include no abdominal pain,  arthralgias, chest pain, chills, coughing, diaphoresis, fever, headaches, nausea or vomiting.       Constitution: Positive for fatigue (Reports chronic). Negative for chills, sweating and fever.   HENT: Negative.    Neck: neck negative. Negative for painful lymph nodes.   Cardiovascular: Negative.  Negative for chest pain and palpitations.   Eyes: Negative.    Respiratory: Negative.  Negative for cough and shortness of breath.    Gastrointestinal: Negative.  Negative for abdominal pain, nausea, vomiting and diarrhea.   Endocrine: negative.   Genitourinary: Negative.    Musculoskeletal: Positive for joint swelling (Left ankle). Negative for trauma, joint pain and back pain.   Skin: Positive for wound (Reports possible insect bite to left ankle) and erythema (Left lateral ankle).   Allergic/Immunologic: Positive for itching.   Neurological: Negative.  Negative for dizziness, light-headedness, passing out, headaches, disorientation and altered mental status.   Hematologic/Lymphatic: Negative.  Negative for swollen lymph nodes.   Psychiatric/Behavioral: Negative.  Negative for altered mental status, disorientation and confusion.       Objective:      Physical Exam   Constitutional: He is oriented to person, place, and time. He appears well-developed. He is cooperative.  Non-toxic appearance. He does not appear ill. No distress.   HENT:   Head: Normocephalic and atraumatic.   Ears:   Right Ear: Hearing, tympanic membrane, external ear and ear canal normal.   Left Ear: Hearing, tympanic membrane, external ear and ear canal normal.   Nose: Nose normal. No mucosal edema, rhinorrhea or nasal deformity. No epistaxis. Right sinus exhibits no maxillary sinus tenderness and no frontal sinus tenderness. Left sinus exhibits no maxillary sinus tenderness and no frontal sinus tenderness.   Mouth/Throat: Uvula is midline, oropharynx is clear and moist and mucous membranes are normal. No trismus in the jaw. Normal dentition. No uvula  swelling. No posterior oropharyngeal erythema.   Eyes: Conjunctivae and lids are normal. Pupils are equal, round, and reactive to light. Right eye exhibits no discharge. Left eye exhibits no discharge. No scleral icterus.   Neck: Trachea normal and phonation normal. Neck supple.   Cardiovascular: Normal rate, regular rhythm, normal heart sounds and normal pulses.   Pulmonary/Chest: Effort normal and breath sounds normal. No respiratory distress. He has no wheezes. He has no rales.   Abdominal: Normal appearance and bowel sounds are normal. He exhibits no distension. Soft. There is no abdominal tenderness.   Musculoskeletal: Normal range of motion.         General: No deformity. Normal range of motion.      Left ankle: He exhibits swelling (Left lateral ankle). He exhibits normal range of motion and no ecchymosis. No tenderness.        Feet:    Neurological: He is alert and oriented to person, place, and time. He displays weakness (Generalized). He exhibits normal muscle tone. Gait (Use of assistive devices; Rollator) abnormal. Coordination normal.   Skin: Skin is warm, dry, intact, not diaphoretic, not pale and no rash. Capillary refill takes less than 2 seconds. Abrasions - lower ext.:  Ankle (left)erythema (Left lateral ankle)   Psychiatric: His speech is normal and behavior is normal. Judgment and thought content normal.   Nursing note and vitals reviewed.        Assessment:       1. Cellulitis of left ankle    2. Insect bite of left ankle, initial encounter          Plan:         Cellulitis of left ankle    Insect bite of left ankle, initial encounter    Other orders  -     cephALEXin (KEFLEX) 500 MG capsule; Take 1 capsule (500 mg total) by mouth 4 (four) times daily. for 7 days  Dispense: 28 capsule; Refill: 0                 Take medications as prescribed.  Recommend keeping left lower extremity elevated.  Recommend rotate nice and warm moist heat as directed.  Tylenol/Motrin per package instructions for any  pain or fever.  Follow-up with PCP in 1-2 days.  Return to clinic as needed.  To ED for any new or acutely worsening symptoms.  Patient and spouse both in agreement with plan of care.    DISCLAIMER: Please note that my documentation in this Electronic Healthcare Record was produced using speech recognition software and therefore may contain errors related to that software system.These could include grammar, punctuation and spelling errors or the inclusion/exclusion of phrases that were not intended. Garbled syntax, mangled pronouns, and other bizarre constructions may be attributed to that software system.

## 2022-07-14 ENCOUNTER — OFFICE VISIT (OUTPATIENT)
Dept: FAMILY MEDICINE | Facility: CLINIC | Age: 82
End: 2022-07-14
Payer: MEDICARE

## 2022-07-14 VITALS
WEIGHT: 179 LBS | OXYGEN SATURATION: 96 % | TEMPERATURE: 97 F | HEART RATE: 93 BPM | BODY MASS INDEX: 28.09 KG/M2 | DIASTOLIC BLOOD PRESSURE: 68 MMHG | SYSTOLIC BLOOD PRESSURE: 112 MMHG | HEIGHT: 67 IN

## 2022-07-14 DIAGNOSIS — I95.2 HYPOTENSION DUE TO DRUGS: Primary | ICD-10-CM

## 2022-07-14 DIAGNOSIS — R29.898 LEFT LEG WEAKNESS: ICD-10-CM

## 2022-07-14 DIAGNOSIS — Z79.899 ENCOUNTER FOR LONG-TERM (CURRENT) USE OF OTHER MEDICATIONS: ICD-10-CM

## 2022-07-14 DIAGNOSIS — E78.00 HYPERCHOLESTEROLEMIA: ICD-10-CM

## 2022-07-14 DIAGNOSIS — Z98.890 HISTORY OF AAA (ABDOMINAL AORTIC ANEURYSM) REPAIR: ICD-10-CM

## 2022-07-14 PROCEDURE — 3078F DIAST BP <80 MM HG: CPT | Mod: CPTII,S$GLB,, | Performed by: INTERNAL MEDICINE

## 2022-07-14 PROCEDURE — 1100F PTFALLS ASSESS-DOCD GE2>/YR: CPT | Mod: CPTII,S$GLB,, | Performed by: INTERNAL MEDICINE

## 2022-07-14 PROCEDURE — 1125F PR PAIN SEVERITY QUANTIFIED, PAIN PRESENT: ICD-10-PCS | Mod: CPTII,S$GLB,, | Performed by: INTERNAL MEDICINE

## 2022-07-14 PROCEDURE — 3288F PR FALLS RISK ASSESSMENT DOCUMENTED: ICD-10-PCS | Mod: CPTII,S$GLB,, | Performed by: INTERNAL MEDICINE

## 2022-07-14 PROCEDURE — 3078F PR MOST RECENT DIASTOLIC BLOOD PRESSURE < 80 MM HG: ICD-10-PCS | Mod: CPTII,S$GLB,, | Performed by: INTERNAL MEDICINE

## 2022-07-14 PROCEDURE — 3288F FALL RISK ASSESSMENT DOCD: CPT | Mod: CPTII,S$GLB,, | Performed by: INTERNAL MEDICINE

## 2022-07-14 PROCEDURE — 1160F RVW MEDS BY RX/DR IN RCRD: CPT | Mod: CPTII,S$GLB,, | Performed by: INTERNAL MEDICINE

## 2022-07-14 PROCEDURE — 1159F PR MEDICATION LIST DOCUMENTED IN MEDICAL RECORD: ICD-10-PCS | Mod: CPTII,S$GLB,, | Performed by: INTERNAL MEDICINE

## 2022-07-14 PROCEDURE — 99213 PR OFFICE/OUTPT VISIT, EST, LEVL III, 20-29 MIN: ICD-10-PCS | Mod: S$GLB,,, | Performed by: INTERNAL MEDICINE

## 2022-07-14 PROCEDURE — 1125F AMNT PAIN NOTED PAIN PRSNT: CPT | Mod: CPTII,S$GLB,, | Performed by: INTERNAL MEDICINE

## 2022-07-14 PROCEDURE — 1100F PR PT FALLS ASSESS DOC 2+ FALLS/FALL W/INJURY/YR: ICD-10-PCS | Mod: CPTII,S$GLB,, | Performed by: INTERNAL MEDICINE

## 2022-07-14 PROCEDURE — 1159F MED LIST DOCD IN RCRD: CPT | Mod: CPTII,S$GLB,, | Performed by: INTERNAL MEDICINE

## 2022-07-14 PROCEDURE — 99213 OFFICE O/P EST LOW 20 MIN: CPT | Mod: S$GLB,,, | Performed by: INTERNAL MEDICINE

## 2022-07-14 PROCEDURE — 3074F PR MOST RECENT SYSTOLIC BLOOD PRESSURE < 130 MM HG: ICD-10-PCS | Mod: CPTII,S$GLB,, | Performed by: INTERNAL MEDICINE

## 2022-07-14 PROCEDURE — 1160F PR REVIEW ALL MEDS BY PRESCRIBER/CLIN PHARMACIST DOCUMENTED: ICD-10-PCS | Mod: CPTII,S$GLB,, | Performed by: INTERNAL MEDICINE

## 2022-07-14 PROCEDURE — 3074F SYST BP LT 130 MM HG: CPT | Mod: CPTII,S$GLB,, | Performed by: INTERNAL MEDICINE

## 2022-07-14 RX ORDER — VITS A,C,E/LUTEIN/MINERALS 300MCG-200
1 TABLET ORAL DAILY
COMMUNITY

## 2022-07-14 RX ORDER — CHOLECALCIFEROL (VITAMIN D3) 25 MCG
TABLET ORAL
COMMUNITY

## 2022-07-14 RX ORDER — LORAZEPAM 1 MG/1
1-2 TABLET ORAL
COMMUNITY
Start: 2022-07-11 | End: 2023-07-11

## 2022-07-14 NOTE — PROGRESS NOTES
Subjective:       Patient ID: Giovanni Zavaleta Sr. is a 81 y.o. male.    Chief Complaint: Follow-up (Discuss RLS)    Patient returns for follow-up of his hypotension issues.  On review of the chart for the last 5 visits all the way back to December 29, 2021 the recommendation had been made at the time to actually hold the labetalol on review of the medications now he has actually been getting the half of a labetalol 200 mg at bedtime and his blood pressure has been stable.  The measurement today is 112/68 with a pulse of 93 per minute on initial measurement.  At the time of my auscultation patient's heart rate is in the mid 80s.  He is not having the orthostatic symptomatology he was experiencing before.    Also on review of medication list pt is taking half of the Tricor on according to the lab results history the last time that he had a lipid profile was on February 24, 2021. At that time by historical data he was taking rosuvastatin 5 mg every day and Tricor 1 tablet every day.  Total cholesterol was 122 with an HDL 44 LDL of 61 and triglycerides of 91. Plans are for him to return to the office fasting on July 26 for a lipid profile and comprehensive metabolic panel.  TSH, hemoglobin A1c, and CBC were all unremarkable at the time of that last lab work December 7, 2021    Regarding the care gaps patient is interested in receiving the seasonal influenza vaccination when available.  He has had 2 doses of the COVID vaccine on 1 booster.    Medication list has been reviewed and there is no need for any other    Review of Systems      Objective:      Physical Exam  Vitals and nursing note reviewed.   Constitutional:       General: He is not in acute distress.     Appearance: Normal appearance. He is normal weight. He is not ill-appearing, toxic-appearing or diaphoretic.   HENT:      Head: Normocephalic and atraumatic.   Cardiovascular:      Rate and Rhythm: Normal rate and regular rhythm.      Pulses: Normal pulses.       Heart sounds: Normal heart sounds.   Skin:     General: Skin is warm and dry.      Capillary Refill: Capillary refill takes 2 to 3 seconds.      Coloration: Skin is not jaundiced or pale.   Neurological:      Mental Status: He is alert. Mental status is at baseline.      Motor: Weakness present.      Gait: Gait abnormal.   Psychiatric:         Mood and Affect: Mood normal.         Behavior: Behavior normal.         Assessment:       Problem List Items Addressed This Visit        Cardiac/Vascular    History of AAA (abdominal aortic aneurysm) repair      Other Visit Diagnoses     Hypotension due to drugs    -  Primary    Hypercholesterolemia        Encounter for long-term (current) use of other medications        Left leg weakness              Plan:       With regards to the hypotension due to medications patient is to continue the Flomax 0.4 mg and the Normodyne 200 mg half a tablet every night like he has been doing.  This is a review of the medication list that now brings it up today.    MRI of the lumbar spine is been scheduled by Neurology because of the continued left leg weakness.    Regarding the hypercholesterolemia and hypertriglyceridemia the lab work is past due and plans are for the patient to return to the office on July 26 on a fasting state to be able to get a lipid panel and comprehensive metabolic panel.  Last lab was performed December 7, 2021 on the other labs but the last lipid was February of 2021. This will bring that aspect of his care up-to-date.    Is interested in receiving the seasonal influenza vaccination when available.    There has been no myalgia to suggest intolerance to the statin in the form of rosuvastatin 5 mg every day.  Patient is taking half a tablet of the Tricor right now to get with the rosuvastatin 5 mg.    He is in a normal sinus rhythm at time of auscultation.  Blood pressure was 112/68 with a pulse in the mid 80s on the normal 900 mg at night.    Pt is well aware of  the signs and symptoms to watch for and to seek medical attention in case these appear

## 2022-07-26 ENCOUNTER — OFFICE VISIT (OUTPATIENT)
Dept: URGENT CARE | Facility: CLINIC | Age: 82
End: 2022-07-26
Payer: MEDICARE

## 2022-07-26 ENCOUNTER — CLINICAL SUPPORT (OUTPATIENT)
Dept: FAMILY MEDICINE | Facility: CLINIC | Age: 82
End: 2022-07-26
Payer: MEDICARE

## 2022-07-26 VITALS
BODY MASS INDEX: 27.94 KG/M2 | WEIGHT: 178 LBS | SYSTOLIC BLOOD PRESSURE: 124 MMHG | DIASTOLIC BLOOD PRESSURE: 72 MMHG | HEART RATE: 69 BPM | HEIGHT: 67 IN | OXYGEN SATURATION: 97 % | RESPIRATION RATE: 16 BRPM

## 2022-07-26 VITALS
DIASTOLIC BLOOD PRESSURE: 70 MMHG | TEMPERATURE: 98 F | HEART RATE: 69 BPM | BODY MASS INDEX: 27.94 KG/M2 | HEIGHT: 67 IN | WEIGHT: 178 LBS | SYSTOLIC BLOOD PRESSURE: 135 MMHG | RESPIRATION RATE: 18 BRPM | OXYGEN SATURATION: 95 %

## 2022-07-26 DIAGNOSIS — J20.9 ACUTE BRONCHITIS, UNSPECIFIED ORGANISM: ICD-10-CM

## 2022-07-26 DIAGNOSIS — R05.9 COUGH: Primary | ICD-10-CM

## 2022-07-26 LAB
CTP QC/QA: YES
SARS-COV-2 AG RESP QL IA.RAPID: NEGATIVE

## 2022-07-26 PROCEDURE — 1125F PR PAIN SEVERITY QUANTIFIED, PAIN PRESENT: ICD-10-PCS | Mod: CPTII,S$GLB,, | Performed by: STUDENT IN AN ORGANIZED HEALTH CARE EDUCATION/TRAINING PROGRAM

## 2022-07-26 PROCEDURE — 3075F PR MOST RECENT SYSTOLIC BLOOD PRESS GE 130-139MM HG: ICD-10-PCS | Mod: CPTII,S$GLB,, | Performed by: STUDENT IN AN ORGANIZED HEALTH CARE EDUCATION/TRAINING PROGRAM

## 2022-07-26 PROCEDURE — 87811 SARS CORONAVIRUS 2 ANTIGEN POCT, MANUAL READ: ICD-10-PCS | Mod: QW,S$GLB,, | Performed by: STUDENT IN AN ORGANIZED HEALTH CARE EDUCATION/TRAINING PROGRAM

## 2022-07-26 PROCEDURE — 1159F MED LIST DOCD IN RCRD: CPT | Mod: CPTII,S$GLB,, | Performed by: STUDENT IN AN ORGANIZED HEALTH CARE EDUCATION/TRAINING PROGRAM

## 2022-07-26 PROCEDURE — 1160F RVW MEDS BY RX/DR IN RCRD: CPT | Mod: CPTII,S$GLB,, | Performed by: STUDENT IN AN ORGANIZED HEALTH CARE EDUCATION/TRAINING PROGRAM

## 2022-07-26 PROCEDURE — 80053 COMPREHEN METABOLIC PANEL: CPT | Performed by: INTERNAL MEDICINE

## 2022-07-26 PROCEDURE — 87811 SARS-COV-2 COVID19 W/OPTIC: CPT | Mod: QW,S$GLB,, | Performed by: STUDENT IN AN ORGANIZED HEALTH CARE EDUCATION/TRAINING PROGRAM

## 2022-07-26 PROCEDURE — 1100F PTFALLS ASSESS-DOCD GE2>/YR: CPT | Mod: CPTII,S$GLB,, | Performed by: STUDENT IN AN ORGANIZED HEALTH CARE EDUCATION/TRAINING PROGRAM

## 2022-07-26 PROCEDURE — 99214 PR OFFICE/OUTPT VISIT, EST, LEVL IV, 30-39 MIN: ICD-10-PCS | Mod: S$GLB,,, | Performed by: STUDENT IN AN ORGANIZED HEALTH CARE EDUCATION/TRAINING PROGRAM

## 2022-07-26 PROCEDURE — 80061 LIPID PANEL: CPT | Performed by: INTERNAL MEDICINE

## 2022-07-26 PROCEDURE — 3288F FALL RISK ASSESSMENT DOCD: CPT | Mod: CPTII,S$GLB,, | Performed by: STUDENT IN AN ORGANIZED HEALTH CARE EDUCATION/TRAINING PROGRAM

## 2022-07-26 PROCEDURE — 1160F PR REVIEW ALL MEDS BY PRESCRIBER/CLIN PHARMACIST DOCUMENTED: ICD-10-PCS | Mod: CPTII,S$GLB,, | Performed by: STUDENT IN AN ORGANIZED HEALTH CARE EDUCATION/TRAINING PROGRAM

## 2022-07-26 PROCEDURE — 3078F PR MOST RECENT DIASTOLIC BLOOD PRESSURE < 80 MM HG: ICD-10-PCS | Mod: CPTII,S$GLB,, | Performed by: STUDENT IN AN ORGANIZED HEALTH CARE EDUCATION/TRAINING PROGRAM

## 2022-07-26 PROCEDURE — 3075F SYST BP GE 130 - 139MM HG: CPT | Mod: CPTII,S$GLB,, | Performed by: STUDENT IN AN ORGANIZED HEALTH CARE EDUCATION/TRAINING PROGRAM

## 2022-07-26 PROCEDURE — 1100F PR PT FALLS ASSESS DOC 2+ FALLS/FALL W/INJURY/YR: ICD-10-PCS | Mod: CPTII,S$GLB,, | Performed by: STUDENT IN AN ORGANIZED HEALTH CARE EDUCATION/TRAINING PROGRAM

## 2022-07-26 PROCEDURE — 3078F DIAST BP <80 MM HG: CPT | Mod: CPTII,S$GLB,, | Performed by: STUDENT IN AN ORGANIZED HEALTH CARE EDUCATION/TRAINING PROGRAM

## 2022-07-26 PROCEDURE — 99214 OFFICE O/P EST MOD 30 MIN: CPT | Mod: S$GLB,,, | Performed by: STUDENT IN AN ORGANIZED HEALTH CARE EDUCATION/TRAINING PROGRAM

## 2022-07-26 PROCEDURE — 1125F AMNT PAIN NOTED PAIN PRSNT: CPT | Mod: CPTII,S$GLB,, | Performed by: STUDENT IN AN ORGANIZED HEALTH CARE EDUCATION/TRAINING PROGRAM

## 2022-07-26 PROCEDURE — 3288F PR FALLS RISK ASSESSMENT DOCUMENTED: ICD-10-PCS | Mod: CPTII,S$GLB,, | Performed by: STUDENT IN AN ORGANIZED HEALTH CARE EDUCATION/TRAINING PROGRAM

## 2022-07-26 PROCEDURE — 1159F PR MEDICATION LIST DOCUMENTED IN MEDICAL RECORD: ICD-10-PCS | Mod: CPTII,S$GLB,, | Performed by: STUDENT IN AN ORGANIZED HEALTH CARE EDUCATION/TRAINING PROGRAM

## 2022-07-26 RX ORDER — PROMETHAZINE HYDROCHLORIDE AND DEXTROMETHORPHAN HYDROBROMIDE 6.25; 15 MG/5ML; MG/5ML
5 SYRUP ORAL NIGHTLY PRN
Qty: 118 ML | Refills: 0 | Status: SHIPPED | OUTPATIENT
Start: 2022-07-26 | End: 2022-08-05

## 2022-07-26 NOTE — PROGRESS NOTES
"Subjective:       Patient ID: Giovanni Zavaleta Sr. is a 81 y.o. male.    Vitals:  height is 5' 7" (1.702 m) and weight is 80.7 kg (178 lb). His oral temperature is 97.5 °F (36.4 °C). His blood pressure is 135/70 and his pulse is 69. His respiration is 18 and oxygen saturation is 95%.     Chief Complaint: Cough (X 3 days ) and Fatigue (PT reports getting tired walking from the bedroom to the kitchen )    Patient is an 81-year-old male with a past medical history of coronary artery disease, chronic kidney disease, degenerative joint disease, hyperlipidemia, hypertension, peripheral vascular disease, and BPH who presents to clinic via spouse for evaluation of cough and fatigue.  Patient reports he is vaccinated for COVID.  Patient reports his son tested positive for COVID 2 weeks ago.  Patient reports has not been around his son in the past 2 weeks.  Patient does report that his friends are sick with allergy type symptoms however do not have COVID.  Patient reports with his chronic medical conditions and everything he is currently dealing with at current he wants to know if he may have COVID.  Patient reports no over-the-counter medications with symptoms at current.  Patient states that he experiences fatigue which is chronic, chronic left leg pain, nasal sinus congestion with postnasal drainage, and an occasionally productive cough which seems to only occur at night.  Patient states once he clears his throat and the cough he is able to go back to sleep without problem.  Patient denies any acute headaches or dizziness, generalized body aches, fever or chills, chest pain or shortness of breath, abdominal pain, nausea or vomiting.  Patient is currently on antibiotics for a tooth infection.  Patient's wife thinks this antibiotic as clindamycin.  Patient reports has been on this medication for approximately 6-8 days now.    Cough  This is a new problem. The current episode started in the past 7 days. The cough is " productive of sputum. Associated symptoms include postnasal drip. Pertinent negatives include no chest pain, chills, ear pain, fever, headaches, rash, sore throat or shortness of breath.   Fatigue  This is a new problem. The current episode started in the past 7 days. Associated symptoms include arthralgias (Reports chronic left leg pain), congestion, coughing (Reports primarily at night) and fatigue (Reports chronic). Pertinent negatives include no abdominal pain, chest pain, chills, fever, headaches, nausea, rash, sore throat or vomiting.       Constitution: Positive for fatigue (Reports chronic). Negative for chills and fever.   HENT: Positive for congestion and postnasal drip. Negative for ear pain and sore throat.    Neck: neck negative.   Cardiovascular: Negative.  Negative for chest pain.   Eyes: Negative.    Respiratory: Positive for cough (Reports primarily at night) and sputum production (Reports occasional production with hard coughing). Negative for chest tightness and shortness of breath.    Gastrointestinal: Negative.  Negative for abdominal pain, nausea, vomiting and diarrhea.   Endocrine: negative.   Musculoskeletal: Positive for joint pain (Reports chronic left leg pain). Negative for trauma.   Skin: Negative for color change, pale, rash and erythema.   Allergic/Immunologic: Negative.    Neurological: Negative.  Negative for dizziness, light-headedness, passing out, headaches, disorientation and altered mental status.   Hematologic/Lymphatic: Negative.    Psychiatric/Behavioral: Negative.  Negative for altered mental status, disorientation and confusion.       Objective:      Physical Exam   Constitutional: He is oriented to person, place, and time. He appears well-developed. He is cooperative.  Non-toxic appearance. He does not appear ill. No distress.   HENT:   Head: Normocephalic and atraumatic.   Ears:   Right Ear: Hearing, tympanic membrane, external ear and ear canal normal.   Left Ear:  Hearing, tympanic membrane, external ear and ear canal normal.   Nose: Congestion present. No mucosal edema, rhinorrhea or nasal deformity. No epistaxis. Right sinus exhibits no maxillary sinus tenderness and no frontal sinus tenderness. Left sinus exhibits no maxillary sinus tenderness and no frontal sinus tenderness.   Mouth/Throat: Uvula is midline, oropharynx is clear and moist and mucous membranes are normal. Mucous membranes are moist. No trismus in the jaw. Normal dentition. No uvula swelling. No oropharyngeal exudate or posterior oropharyngeal erythema. Oropharynx is clear.   Eyes: Conjunctivae and lids are normal. Pupils are equal, round, and reactive to light. Right eye exhibits no discharge. Left eye exhibits no discharge. No scleral icterus.   Neck: Trachea normal and phonation normal. Neck supple.   Cardiovascular: Normal rate, regular rhythm and normal pulses. Exam reveals distant heart sounds.   Pulmonary/Chest: Effort normal. No respiratory distress. He has decreased breath sounds (Slightly diminished however no adventitious breath sounds noted.). He has no wheezes. He has no rhonchi. He has no rales.   Abdominal: Normal appearance and bowel sounds are normal. He exhibits no distension. Soft. There is no abdominal tenderness.   Musculoskeletal: Normal range of motion.         General: No deformity. Normal range of motion.   Neurological: He is alert and oriented to person, place, and time. He displays weakness (Generalized). He exhibits normal muscle tone. Gait (Use of assistive devices) abnormal. Coordination normal.   Skin: Skin is warm, dry, intact, not diaphoretic, not pale and no rash. Capillary refill takes 2 to 3 seconds. No erythema   Psychiatric: His speech is normal and behavior is normal. Judgment and thought content normal.   Nursing note and vitals reviewed.        Assessment:       1. Cough    2. Acute bronchitis, unspecified organism          Plan:         Cough  -     SARS Coronavirus  2 Antigen, POCT Manual Read    Acute bronchitis, unspecified organism    Other orders  -     promethazine-dextromethorphan (PROMETHAZINE-DM) 6.25-15 mg/5 mL Syrp; Take 5 mLs by mouth nightly as needed (Cough).  Dispense: 118 mL; Refill: 0                 Labs:  COVID negative.  Radiology not available in clinic at current.  Patient provided with stat outpatient chest x-ray ordered.  Continue previously prescribed antibiotics as directed.  Continue previously prescribed inhalers as directed.  Take medications as prescribed for cough.  Tylenol/Motrin per package instructions for any pain or fever.  Assure adequate hydration.  Follow-up with PCP within 1-2 days.  Return to clinic as needed.  To ED for any new or acutely worsening symptoms.  Patient in agreement with plan of care.    DISCLAIMER: Please note that my documentation in this Electronic Healthcare Record was produced using speech recognition software and therefore may contain errors related to that software system.These could include grammar, punctuation and spelling errors or the inclusion/exclusion of phrases that were not intended. Garbled syntax, mangled pronouns, and other bizarre constructions may be attributed to that software system.

## 2022-07-26 NOTE — PROGRESS NOTES
Giovanni WALLACE Waqar Sr. 81 y.o. male is here today for Blood Pressure check.   History of HTN yes.    Review of patient's allergies indicates:   Allergen Reactions    Pravastatin Rash     Creatinine   Date Value Ref Range Status   12/07/2021 1.4 0.5 - 1.4 mg/dL Final     Sodium   Date Value Ref Range Status   12/07/2021 146 (H) 136 - 145 mmol/L Final     Potassium   Date Value Ref Range Status   12/07/2021 4.4 3.5 - 5.1 mmol/L Final   ]  Patient verifies taking blood pressure medications on a regular basis at the same time of the day.     Current Outpatient Medications:     albuterol (PROVENTIL/VENTOLIN HFA) 90 mcg/actuation inhaler, Inhale 2 puffs into the lungs 4 (four) times daily., Disp: 18 g, Rfl: 1    ascorbic acid, vitamin C, (VITAMIN C) 100 MG tablet, Take 100 mg by mouth once daily., Disp: , Rfl:     aspirin 81 MG Chew, Chew 1 tablet every day by oral route., Disp: , Rfl:     ciclopirox (PENLAC) 8 % Soln, Apply over nail and surrounding skin. Apply daily over previous coat. After seven (7) days, may remove with alcohol and continue cycle., Disp: , Rfl:     clopidogreL (PLAVIX) 75 mg tablet, Take 1 tablet (75 mg total) by mouth once daily., Disp: 30 tablet, Rfl: 0    clotrimazole-betamethasone 1-0.05% (LOTRISONE) cream, Apply topically 2 (two) times daily., Disp: 45 g, Rfl: 3    coenzyme Q10 100 mg capsule, Take 100 mg by mouth once daily., Disp: , Rfl:     finasteride (PROSCAR) 5 mg tablet, Take 1 tablet (5 mg total) by mouth once daily., Disp: 90 tablet, Rfl: 6    fluticasone propionate (FLONASE) 50 mcg/actuation nasal spray, 2 sprays (100 mcg total) by Each Nostril route once daily at 6am., Disp: 30 g, Rfl: 3    hydrocortisone 1 % lotion, Apply topically 2 (two) times daily., Disp: , Rfl:     ketorolac 0.5% (ACULAR) 0.5 % Drop, INSTILL 1 DROP INTO BOTH EYES 4 TIMES A DAY, Disp: , Rfl:     LORazepam (ATIVAN) 1 MG tablet, Take 1-2 mg by mouth., Disp: , Rfl:     melatonin 3 mg Cap, Take 1 capsule  by mouth every evening., Disp: , Rfl:     mupirocin (BACTROBAN) 2 % ointment, Apply topically 2 (two) times daily., Disp: 30 g, Rfl: 0    oxybutynin (DITROPAN) 5 MG Tab, TAKE 1 TABLET EVERY DAY (NEED MD APPOINTMENT FOR REFILLS), Disp: 90 tablet, Rfl: 3    promethazine-dextromethorphan (PROMETHAZINE-DM) 6.25-15 mg/5 mL Syrp, Take 5 mLs by mouth nightly as needed (Cough)., Disp: 118 mL, Rfl: 0    rosuvastatin (CRESTOR) 10 MG tablet, Take 1 tablet (10 mg total) by mouth once daily., Disp: 90 tablet, Rfl: 3    tamsulosin (FLOMAX) 0.4 mg Cap, TAKE 2 CAPSULES EVERY DAY (NEED MD APPOINTMENT FOR REFILLS), Disp: 180 capsule, Rfl: 3    triamcinolone acetonide 0.1% (KENALOG) 0.1 % cream, Apply topically 2 (two) times daily., Disp: , Rfl:     vit A,C & E-lutein-minerals 1,000-60-2 unit (OCUVITE WITH LUTEIN) Tab, Take 1 tablet by mouth once daily., Disp: , Rfl:     vitamin D (VITAMIN D3) 1000 units Tab, Take by mouth., Disp: , Rfl:     fenofibrate 160 MG Tab, Take 1 tablet (160 mg total) by mouth once daily. (Patient not taking: Reported on 7/26/2022), Disp: 90 tablet, Rfl: 3    labetaloL (NORMODYNE) 200 MG tablet, TAKE 1 TABLET EVERY DAY (NEED MD APPOINTMENT FOR REFILLS) (Patient not taking: Reported on 7/26/2022), Disp: 90 tablet, Rfl: 3  Does patient have record of home blood pressure readings no. Readings have been averaging NA.   Last dose of blood pressure medication was taken at NA.  Patient is asymptomatic.   Complains of NA.    BP: 124/72 , Pulse: 69 .      Dr. Lozano notified. Patient instructed to continue to monitor BP at home and return to clinic for nurse visit for BP check as needed. Patient verbalized understanding. JAGDISH Quintana

## 2022-07-28 ENCOUNTER — TELEPHONE (OUTPATIENT)
Dept: UROLOGY | Facility: CLINIC | Age: 82
End: 2022-07-28
Payer: MEDICARE

## 2022-07-28 NOTE — TELEPHONE ENCOUNTER
Spoke w pts wife and scheduled annual appt with NP requested appt be same day as wifes   appt scheduled

## 2022-07-28 NOTE — TELEPHONE ENCOUNTER
----- Message from Radha Hernandez sent at 7/28/2022 12:15 PM CDT -----  Type:  Sooner Apoointment Request    Caller is requesting a sooner appointment.  Caller declined first available appointment listed below.  Caller will not accept being placed on the waitlist and is requesting a message be sent to doctor.    Name of Caller:Pt Wife     When is the first available appointment?10/24    Symptoms: PSA pt wife is request an appt the same time as hers which is 09/12 at 10:45     Best Call Back Number:[phone]

## 2022-08-08 ENCOUNTER — OFFICE VISIT (OUTPATIENT)
Dept: FAMILY MEDICINE | Facility: CLINIC | Age: 82
End: 2022-08-08
Payer: MEDICARE

## 2022-08-08 VITALS
SYSTOLIC BLOOD PRESSURE: 114 MMHG | TEMPERATURE: 98 F | OXYGEN SATURATION: 96 % | WEIGHT: 178 LBS | DIASTOLIC BLOOD PRESSURE: 76 MMHG | BODY MASS INDEX: 27.94 KG/M2 | HEIGHT: 67 IN | HEART RATE: 76 BPM

## 2022-08-08 DIAGNOSIS — R29.898 WEAKNESS OF LEFT LOWER EXTREMITY: ICD-10-CM

## 2022-08-08 DIAGNOSIS — R54 FRAILTY SYNDROME IN GERIATRIC PATIENT: ICD-10-CM

## 2022-08-08 DIAGNOSIS — I73.9 PERIPHERAL VASCULAR DISEASE: ICD-10-CM

## 2022-08-08 DIAGNOSIS — R29.898 LEFT LEG WEAKNESS: ICD-10-CM

## 2022-08-08 DIAGNOSIS — E78.00 HYPERCHOLESTEROLEMIA: Primary | ICD-10-CM

## 2022-08-08 DIAGNOSIS — H35.3112 NONEXUDATIVE AGE-RELATED MACULAR DEGENERATION, RIGHT EYE, INTERMEDIATE DRY STAGE: ICD-10-CM

## 2022-08-08 DIAGNOSIS — I95.2 HYPOTENSION DUE TO DRUGS: ICD-10-CM

## 2022-08-08 DIAGNOSIS — R41.3 POOR MEMORY: ICD-10-CM

## 2022-08-08 PROCEDURE — 3288F PR FALLS RISK ASSESSMENT DOCUMENTED: ICD-10-PCS | Mod: CPTII,S$GLB,, | Performed by: INTERNAL MEDICINE

## 2022-08-08 PROCEDURE — 1126F PR PAIN SEVERITY QUANTIFIED, NO PAIN PRESENT: ICD-10-PCS | Mod: CPTII,S$GLB,, | Performed by: INTERNAL MEDICINE

## 2022-08-08 PROCEDURE — 99213 PR OFFICE/OUTPT VISIT, EST, LEVL III, 20-29 MIN: ICD-10-PCS | Mod: S$GLB,,, | Performed by: INTERNAL MEDICINE

## 2022-08-08 PROCEDURE — 1159F PR MEDICATION LIST DOCUMENTED IN MEDICAL RECORD: ICD-10-PCS | Mod: CPTII,S$GLB,, | Performed by: INTERNAL MEDICINE

## 2022-08-08 PROCEDURE — 99213 OFFICE O/P EST LOW 20 MIN: CPT | Mod: S$GLB,,, | Performed by: INTERNAL MEDICINE

## 2022-08-08 PROCEDURE — 1101F PT FALLS ASSESS-DOCD LE1/YR: CPT | Mod: CPTII,S$GLB,, | Performed by: INTERNAL MEDICINE

## 2022-08-08 PROCEDURE — 3074F PR MOST RECENT SYSTOLIC BLOOD PRESSURE < 130 MM HG: ICD-10-PCS | Mod: CPTII,S$GLB,, | Performed by: INTERNAL MEDICINE

## 2022-08-08 PROCEDURE — 3078F PR MOST RECENT DIASTOLIC BLOOD PRESSURE < 80 MM HG: ICD-10-PCS | Mod: CPTII,S$GLB,, | Performed by: INTERNAL MEDICINE

## 2022-08-08 PROCEDURE — 1126F AMNT PAIN NOTED NONE PRSNT: CPT | Mod: CPTII,S$GLB,, | Performed by: INTERNAL MEDICINE

## 2022-08-08 PROCEDURE — 3074F SYST BP LT 130 MM HG: CPT | Mod: CPTII,S$GLB,, | Performed by: INTERNAL MEDICINE

## 2022-08-08 PROCEDURE — 1101F PR PT FALLS ASSESS DOC 0-1 FALLS W/OUT INJ PAST YR: ICD-10-PCS | Mod: CPTII,S$GLB,, | Performed by: INTERNAL MEDICINE

## 2022-08-08 PROCEDURE — 3288F FALL RISK ASSESSMENT DOCD: CPT | Mod: CPTII,S$GLB,, | Performed by: INTERNAL MEDICINE

## 2022-08-08 PROCEDURE — 1159F MED LIST DOCD IN RCRD: CPT | Mod: CPTII,S$GLB,, | Performed by: INTERNAL MEDICINE

## 2022-08-08 PROCEDURE — 3078F DIAST BP <80 MM HG: CPT | Mod: CPTII,S$GLB,, | Performed by: INTERNAL MEDICINE

## 2022-08-08 NOTE — PROGRESS NOTES
Subjective:       Patient ID: Giovanni Zavaleta Sr. is a 81 y.o. male.    Chief Complaint: Follow-up (Patient is here in office today to review and discuss results of CMP and Lipid Panel from bloodwork drawn on 7/26/2022. ) and Fall (PT had a recent fall at home and was found on the ground; wife states he had a MRI performed; Wife is concerned about surgery and wants to try Physical Therapy first. Please advise for referral. )    Patient returns for follow-up of laboratory evaluation performed this facility on July 26, 2022. Report is available at time of exam will be discussed with the patient and his wife at length.    Comprehensive metabolic panel was unremarkable with a sodium 145 potassium 4.4 chloride 109 bicarb of 25. Glucose was normal at 94 with a BUN of 18 creatinine 1.0 giving him a calculated glomerular filtration rate for non- over 60 cc/minute.  AST and ALT were equal to less than 12. Total bilirubin 0.7 alkaline phosphatase 55. Calcium was normal at 9.6.    On the lipid panel that was performed on a fasting state his total cholesterol was 130 with an HDL of 37 LDL 73 and triglycerides of 101. Patient is compliant and tolerating well his fenofibrate 160 mg every day and rosuvastatin 10 mg every day.  Being that he is to goal and there are no signs of abnormalities in the liver functions or signs of intolerance to same is okay for this lab work to be repeated in 1 year.    At the time of evaluation is blood pressure is excellent 114/76 with normal sinus rhythm at 76 per by auscultation pulse oximetry a 95% room air on oral temperature 97.9°.    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 The conus is normal in size position and signal intensity. No intra or extradural masses are seen.     T12-L1: There is no canal or neural  foraminal air     L1-L2: Desiccation narrowing of disc space without canal or neural from narrowing     L2-L3: Desiccation narrowing disc space. Mild facet degenerative changes with a broad-based disc bulge creates a mild central canal stenosis. No HNP or foraminal narrowing     L3-L4: Desiccation narrowing disc space. Congenital narrowing the canal without foraminal narrowing. No HNP is identified     L4-L5: Desiccation narrowing disc space with facet degenerative changes and ligament flavum hypertrophy creating moderate central canal stenosis. There is a mild broad-based disc bulge. No HNP identified. There is bony narrowing of the left inferolateral recess.     L5-S1: Desiccation narrowing disc space with facet degenerative changes. There is no significant canal, or obvious foraminal narrowing.     The marrow signal shows no significant abnormality. The surrounding soft tissues reveal high signal lesions within the kidneys compatible with cysts but no other significant abnormality identified.    Above findings were at the time of the MRI ordered by Dr. Fernandez as part of the workup for patient's difficulty walking.  Physical therapy has been recommended as the best option for now.  Patient does not have any findings in this MRI that warrant referral for surgical intervention.    Review of Systems      Objective:      Physical Exam  Vitals and nursing note reviewed. Exam conducted with a chaperone present.   Constitutional:       General: He is not in acute distress.     Appearance: Normal appearance. He is normal weight. He is not ill-appearing, toxic-appearing or diaphoretic.   Cardiovascular:      Rate and Rhythm: Normal rate and regular rhythm.      Pulses: Normal pulses.      Heart sounds: Normal heart sounds. No murmur heard.  Pulmonary:      Effort: Pulmonary effort is normal.      Breath sounds: Normal breath sounds.   Musculoskeletal:      Right lower leg: No edema.      Left lower leg: No edema.    Skin:     General: Skin is warm and dry.      Capillary Refill: Capillary refill takes 2 to 3 seconds.      Coloration: Skin is not jaundiced or pale.   Neurological:      General: No focal deficit present.      Mental Status: He is alert and oriented to person, place, and time. Mental status is at baseline.      Cranial Nerves: Cranial nerve deficit present.      Sensory: Sensory deficit present.      Motor: Weakness present.      Gait: Gait abnormal.   Psychiatric:         Mood and Affect: Mood normal.         Behavior: Behavior normal.         Thought Content: Thought content normal.         Judgment: Judgment normal.         Assessment:       Problem List Items Addressed This Visit        Ophtho    Nonexudative age-related macular degeneration, right eye, intermediate dry stage       Cardiac/Vascular    Peripheral vascular disease      Other Visit Diagnoses     Hypercholesterolemia    -  Primary    Left leg weakness        Weakness of left lower extremity        Hypotension due to drugs        Poor memory        Frailty syndrome in geriatric patient              Plan:       With regards to his hypercholesterolemia patient is well within goal of therapy for both right list rides and LDL sub components.  He is to continue the fenofibrate at 160 mg 1 a day and rosuvastatin at 10 mg every day and this lab work can be repeated in a year.    Workup by Neurology has already been discussed in the HPI on physical therapy is the best recommendation now for his difficulty with ambulation and frailty.    Blood pressure is stable at 114/76 and is in a normal sinus rhythm at 76 per.    Patient is interested in receiving his seasonal influenza vaccination when available.    There are no changes related to his nonexudative age-related macular degeneration.    No other care gaps pending.    Has received 2 doses of the COVID vaccine and no booster as of now.    Medication list was reviewed and there is no need for  refills.    Repeat pulse oximetry returned at 96% room air.     Pt is well aware of the signs and symptoms to watch for and to seek medical attention in case these appear     Return to the clinic in 4 months.    Patient is interested in receiving the seasonal influenza vaccination when available.

## 2022-09-12 ENCOUNTER — OFFICE VISIT (OUTPATIENT)
Dept: UROLOGY | Facility: CLINIC | Age: 82
End: 2022-09-12
Payer: MEDICARE

## 2022-09-12 VITALS
HEART RATE: 72 BPM | BODY MASS INDEX: 27.71 KG/M2 | DIASTOLIC BLOOD PRESSURE: 59 MMHG | WEIGHT: 176.56 LBS | HEIGHT: 67 IN | SYSTOLIC BLOOD PRESSURE: 116 MMHG

## 2022-09-12 DIAGNOSIS — N50.89 SCROTAL IRRITATION: ICD-10-CM

## 2022-09-12 DIAGNOSIS — N40.1 BPH WITH URINARY OBSTRUCTION: Primary | ICD-10-CM

## 2022-09-12 DIAGNOSIS — N13.8 BPH WITH URINARY OBSTRUCTION: Primary | ICD-10-CM

## 2022-09-12 DIAGNOSIS — N40.1 BENIGN PROSTATIC HYPERPLASIA WITH LOWER URINARY TRACT SYMPTOMS, SYMPTOM DETAILS UNSPECIFIED: ICD-10-CM

## 2022-09-12 LAB — POC RESIDUAL URINE VOLUME: 148 ML (ref 0–100)

## 2022-09-12 PROCEDURE — 3078F DIAST BP <80 MM HG: CPT | Mod: CPTII,S$GLB,, | Performed by: NURSE PRACTITIONER

## 2022-09-12 PROCEDURE — 1101F PT FALLS ASSESS-DOCD LE1/YR: CPT | Mod: CPTII,S$GLB,, | Performed by: NURSE PRACTITIONER

## 2022-09-12 PROCEDURE — 99214 OFFICE O/P EST MOD 30 MIN: CPT | Mod: S$GLB,,, | Performed by: NURSE PRACTITIONER

## 2022-09-12 PROCEDURE — 1160F PR REVIEW ALL MEDS BY PRESCRIBER/CLIN PHARMACIST DOCUMENTED: ICD-10-PCS | Mod: CPTII,S$GLB,, | Performed by: NURSE PRACTITIONER

## 2022-09-12 PROCEDURE — 3074F SYST BP LT 130 MM HG: CPT | Mod: CPTII,S$GLB,, | Performed by: NURSE PRACTITIONER

## 2022-09-12 PROCEDURE — 3078F PR MOST RECENT DIASTOLIC BLOOD PRESSURE < 80 MM HG: ICD-10-PCS | Mod: CPTII,S$GLB,, | Performed by: NURSE PRACTITIONER

## 2022-09-12 PROCEDURE — 51798 POCT BLADDER SCAN: ICD-10-PCS | Mod: S$GLB,,, | Performed by: NURSE PRACTITIONER

## 2022-09-12 PROCEDURE — 1159F PR MEDICATION LIST DOCUMENTED IN MEDICAL RECORD: ICD-10-PCS | Mod: CPTII,S$GLB,, | Performed by: NURSE PRACTITIONER

## 2022-09-12 PROCEDURE — 99214 PR OFFICE/OUTPT VISIT, EST, LEVL IV, 30-39 MIN: ICD-10-PCS | Mod: S$GLB,,, | Performed by: NURSE PRACTITIONER

## 2022-09-12 PROCEDURE — 3288F PR FALLS RISK ASSESSMENT DOCUMENTED: ICD-10-PCS | Mod: CPTII,S$GLB,, | Performed by: NURSE PRACTITIONER

## 2022-09-12 PROCEDURE — 99999 PR PBB SHADOW E&M-EST. PATIENT-LVL IV: CPT | Mod: PBBFAC,,, | Performed by: NURSE PRACTITIONER

## 2022-09-12 PROCEDURE — 1160F RVW MEDS BY RX/DR IN RCRD: CPT | Mod: CPTII,S$GLB,, | Performed by: NURSE PRACTITIONER

## 2022-09-12 PROCEDURE — 99999 PR PBB SHADOW E&M-EST. PATIENT-LVL IV: ICD-10-PCS | Mod: PBBFAC,,, | Performed by: NURSE PRACTITIONER

## 2022-09-12 PROCEDURE — 3074F PR MOST RECENT SYSTOLIC BLOOD PRESSURE < 130 MM HG: ICD-10-PCS | Mod: CPTII,S$GLB,, | Performed by: NURSE PRACTITIONER

## 2022-09-12 PROCEDURE — 1101F PR PT FALLS ASSESS DOC 0-1 FALLS W/OUT INJ PAST YR: ICD-10-PCS | Mod: CPTII,S$GLB,, | Performed by: NURSE PRACTITIONER

## 2022-09-12 PROCEDURE — 3288F FALL RISK ASSESSMENT DOCD: CPT | Mod: CPTII,S$GLB,, | Performed by: NURSE PRACTITIONER

## 2022-09-12 PROCEDURE — 51798 US URINE CAPACITY MEASURE: CPT | Mod: S$GLB,,, | Performed by: NURSE PRACTITIONER

## 2022-09-12 PROCEDURE — 1159F MED LIST DOCD IN RCRD: CPT | Mod: CPTII,S$GLB,, | Performed by: NURSE PRACTITIONER

## 2022-09-12 RX ORDER — NYSTATIN 100000 [USP'U]/G
POWDER TOPICAL 2 TIMES DAILY
Qty: 1 EACH | Refills: 1 | Status: SHIPPED | OUTPATIENT
Start: 2022-09-12 | End: 2023-02-22

## 2022-09-12 RX ORDER — FINASTERIDE 5 MG/1
5 TABLET, FILM COATED ORAL DAILY
Qty: 90 TABLET | Refills: 6 | Status: SHIPPED | OUTPATIENT
Start: 2022-09-12 | End: 2023-11-30

## 2022-09-12 RX ORDER — TAMSULOSIN HYDROCHLORIDE 0.4 MG/1
0.4 CAPSULE ORAL DAILY
Qty: 90 CAPSULE | Refills: 3 | Status: SHIPPED | OUTPATIENT
Start: 2022-09-12 | End: 2023-12-29

## 2022-09-12 RX ORDER — FLUCONAZOLE 150 MG/1
150 TABLET ORAL DAILY
Qty: 5 TABLET | Refills: 0 | Status: SHIPPED | OUTPATIENT
Start: 2022-09-12 | End: 2022-09-17

## 2022-09-12 NOTE — PROGRESS NOTES
[facility] Note  Staff: CADE Philippe-C    PCP: MD Nando  Urologist:  MD Paris    Chief Complaint: Annual F/UP-BPH with LUTS    Subjective:        HPI: Giovanni Zavaleta Sr. is a 81 y.o. male presents today for his annual routine recheck.    Pt was last evaluated by me on 5/28/21.    Last ov with MD (10/14/2019) he finds his lower urinary tract symptoms stable on dual medical therapy with Flomax and finasteride.  He does still have some urgency and frequency though these are both better since last evaluation.  He was unable to afford the Myrbetriq and is on oxybutynin.  We did discuss the concerns about oxybutynin at his age with dry eyes, dry mouth, constipation, changes in mentation and risk of dementia.      Cysto/trus 5/22/18  Prostate volume 35.5cc (W 52.2, H 30.2, L 43.1), no median lobe  Cysto: significant lateral lobe obstruction with kissing lobes centrally, no median lobe, short prostatic urethra, grade 1-2 trabeculations  Dual medical therapy flomax+finasteride, with increase of dose of flomax to 0.8mg, and add myrbetriq for urgency. Continue to decrease coffee intake (he is down to 3-4 cups/day)  - return for uroflow/pvr in 3 months, and consider future Urolift vs. TURP     Did not return for UFS.PVR until 7/11/19 and voided only 60cc with PVR 129cc with 2 distinct curves  He returned 7/18/19 for uroflow voiding 196cc (with pvr only 70cc) with Qm 16.2cc/s, Qa 6.8cc/s, 3 intervals - does have good intial bell curve voiding pattern and then 2nd intermittent low peaking curve to empty  He returns today noting:  AUA SS:  16/4 (4:  Weak stream; 3:  Urgency, sleeping; 2:  Frequency, intermittency; 1:  Emptying, straining).  Postvoid residual by bladder scan is 40 cc  He is not taking Myrbetriq due to cost, and is on Ditropan as per Dr. bernard  He has reduced coffee intake some, as he used to drink it up until going to bed, but now has his last cup of coffee between 530  and 6:00 p.m..  He has decreased hot sauce.  His urgency is a little bit better, and he has increased time between urinations  He does have problems with his CPAP and he is awaiting a new mask.  Poor fit.  With mask can get a good 5 hr of sleep.  The is getting injections for macular degeneration.  Has financial stress related to medications medical care.  Not interested in further intervention at this time.    OV 5/28/21:  PVR by bladder scan: 57 mL  Nocturia 2x nightly  Overall doing well with no complaints voiced.  Current  meds:  Flomax 0.4 mg daily and Finasteride 5 mg daily and tolerating with no problems.     TODAY:  UA in office today--Pt is unable to urinate during ov today.  C/o skin condition on scrotal sac that causes him intermittent pain and irritation.  Has seen a Dermatologist in the past concerning this issue but never improved.    Pt denies gross hematuria or dysuria at this time.  AUA SS Today:  28/3 Mixed  Feeling of ICBE:  3  Frequency:3  Intermittency:5  Urgency:5  Weak urine stream:4  Straining:3  Nocturia:5  PVR by bladder scan performed by MA today:  148 mL    REVIEW OF SYSTEMS:  A comprehensive 10 system review was performed and is negative except as noted above in HPI    PMHx:  Past Medical History:   Diagnosis Date    CAD (coronary artery disease)     CKD (chronic kidney disease) stage 3, GFR 30-59 ml/min     Diverticulosis     DJD (degenerative joint disease)     HLD (hyperlipidemia)     Hypertension     Macular degeneration     PVD (peripheral vascular disease)      PSHx:  Past Surgical History:   Procedure Laterality Date    ARTERIAL BYPASS SURGRY  2009,2010,2012    Dr Abhishek Lantigua; Peripheral arteries    CAROTID ENDARTERECTOMY Bilateral      5 and 6 years ago.    CHOLECYSTECTOMY      COLON SURGERY  2012    10 inches removed     TONSILLECTOMY      at 19 y/o      Allergies:  Pravastatin    Medications: reviewed   Anticoagulation: Yes - ASA, Plavix 75 mg daily    Objective:     Vitals:     09/12/22 1109   BP: (!) 116/59   Pulse: 72     General:WDWN in NAD  Eyes: PERRLA, normal conjunctiva  Respiratory: no increased work on breathing, clear to auscultation  Cardiovascular: regular rate and rhythm. No obvious extremity edema.  GI: palpation of masses. No tenderness. No hepatosplenomegaly to palpation.  Musculoskeletal: normal range of motion of bilateral upper extremities. Normal muscle strength and tone.  Skin: no obvious rashes or lesions. No tightening of skin noted.  Neurologic: CN grossly normal. Normal sensation.   Psychiatric: awake, alert and oriented x 3. Mood and affect normal. Cooperative.     Exam performed by me during ov today:  Bilateral scrotal areas-no skin breakdown, active bleeding observed.  Some thin patches of skin noted around bilateral scrotal areas.  Eczema? Psoriasis? Or other skin issue of unknown?  Bilateral testicles normal in size, no masses, no tenderness observed.    Assessment:       1. BPH with urinary obstruction    2. Benign prostatic hyperplasia with lower urinary tract symptoms, symptom details unspecified    3. Scrotal irritation          Plan:   BPH, Scrotal rash/irritation:    Finasteride 5 mg daily and Flomax 0.4 mg daily refilled for this pt during ov today.  Pt and family are adamant regarding getting a yearly PSA level at this time.  Therefore orders given to pt to have done at [facility] per requested.    Scrotal irritation:  Nystatin powder and Diflucan prescribed to this pt during ov today.  Hygiene care to the area was thoroughly reviewed with pt and family during ov today.  If irritation continues, then was suggested to see Dermatology.    RTC in one year for annual f/up.    [facility]: Active    MATTHEW Gómez

## 2022-09-27 ENCOUNTER — TELEPHONE (OUTPATIENT)
Dept: UROLOGY | Facility: CLINIC | Age: 82
End: 2022-09-27

## 2022-12-04 NOTE — PROGRESS NOTES
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 Wet AMD OS  -Avastin OS # 4 3/28/17  5/17 - slight increase at 6 weeks    Avastin #5 OS today     2. Dry AMD OD  AREDS/AG    3. Early NS OU    RTC 5 weeks OCT    Risks, benefits, and alternatives to treatment discussed in detail with the patient.  The patient voiced understanding and wished to proceed with the procedure    Injection Procedure Note:  Diagnosis: Wet AMD OS    Topical Proparacaine and Betadine.  Inject Avastin OS at 6:00 @ 3.5-4mm posterior to limbus  Post Operative Dx: Same  Complications: None  Follow up as above.               [facility] [place] Case Management Initial Discharge Assessment    Met with Patient to discuss discharge plan. PCP: Karl Avalos MD                                Date of Last Visit: 11/4    VA Patient: No          Discharge Planning    Living Arrangements: independently at home    Who do you live with? ALONE    Who helps you with your care:  self    If lives at home:     Do you have any barriers navigating in your home? no    Patient can perform ADL? Yes    Current Services (outpatient and in home) : [facility]    Dialysis: No    Is transportation available to get to your appointments? PROVIDE A RIDE    DME Equipment:  no    Respiratory equipment: None    Respiratory provider:  no     Pharmacy:  yes - [facility]    Consult with Medication Assistance Program?  No      Patient agreeable to Sally 78? Yes, Elvia 1980    Patient agreeable to SNF/Rehab? N/A    Other discharge needs identified? N/A    Does Patient Have a High-Risk for Readmission Diagnosis (CHF, PN, MI, COPD)? Yes, see care coordinator assessment    If Yes,    Consult with pulmonologist? N/A  Consult with cardiologist? Yes  Cardiac Rehab referral if EF <35%? Yes  Consult with Pharmacy for medication assessment prior to discharge? N/A  Consult with Behavioral health to aid in depression, anxiety, or coping issues? N/A  Palliative Care Consult? N/A  Pulmonary Rehab order for COPD, PN, and CHF (if EF > 35%)? N/A   Does patient have a reliable scale and know how to read it (for CHF)? N/A  Nutrition consult for CHF? N/A  Respiratory therapy consult that includes bedside instruction on administration of nebulizers and/or inhalers, and assessment of oxygen and equipment needs in the home? Yes    Initial Discharge Plan? (Note: please see concurrent daily documentation for any updates after initial note). MET WITH PATIENT, LIVES ALONE, INQUIRING ABOUT HHA- ADVISED PT TO CALL HER  WITH [facility] FOR WAIVER SERVICES.   PT IS AGREEABLE TO [facility].       Readmission Risk              Risk of Unplanned Readmission:  17         Electronically signed by Iris Knowles RN on 12/4/2022 at 8:41 AM

## 2022-12-05 ENCOUNTER — OFFICE VISIT (OUTPATIENT)
Dept: FAMILY MEDICINE | Facility: CLINIC | Age: 82
End: 2022-12-05
Payer: MEDICARE

## 2022-12-05 VITALS
OXYGEN SATURATION: 96 % | HEIGHT: 67 IN | RESPIRATION RATE: 18 BRPM | DIASTOLIC BLOOD PRESSURE: 62 MMHG | BODY MASS INDEX: 27.66 KG/M2 | SYSTOLIC BLOOD PRESSURE: 106 MMHG | HEART RATE: 78 BPM

## 2022-12-05 DIAGNOSIS — Z98.890 HISTORY OF AAA (ABDOMINAL AORTIC ANEURYSM) REPAIR: ICD-10-CM

## 2022-12-05 DIAGNOSIS — R54 FRAILTY SYNDROME IN GERIATRIC PATIENT: ICD-10-CM

## 2022-12-05 DIAGNOSIS — R73.02 IGT (IMPAIRED GLUCOSE TOLERANCE): ICD-10-CM

## 2022-12-05 DIAGNOSIS — L02.31 ABSCESS OF BUTTOCK, LEFT: ICD-10-CM

## 2022-12-05 DIAGNOSIS — D64.9 ANEMIA, UNSPECIFIED TYPE: ICD-10-CM

## 2022-12-05 DIAGNOSIS — H35.3221 EXUDATIVE AGE-RELATED MACULAR DEGENERATION OF LEFT EYE WITH ACTIVE CHOROIDAL NEOVASCULARIZATION: Primary | ICD-10-CM

## 2022-12-05 LAB
ANION GAP SERPL CALC-SCNC: 10 MMOL/L (ref 8–16)
BASOPHILS # BLD AUTO: 0.04 K/UL (ref 0–0.2)
BASOPHILS NFR BLD: 0.5 % (ref 0–1.9)
BUN SERPL-MCNC: 23 MG/DL (ref 8–23)
CALCIUM SERPL-MCNC: 9.7 MG/DL (ref 8.7–10.5)
CHLORIDE SERPL-SCNC: 107 MMOL/L (ref 95–110)
CO2 SERPL-SCNC: 27 MMOL/L (ref 23–29)
CREAT SERPL-MCNC: 1.1 MG/DL (ref 0.5–1.4)
DIFFERENTIAL METHOD: ABNORMAL
EOSINOPHIL # BLD AUTO: 0.3 K/UL (ref 0–0.5)
EOSINOPHIL NFR BLD: 3.6 % (ref 0–8)
ERYTHROCYTE [DISTWIDTH] IN BLOOD BY AUTOMATED COUNT: 13.2 % (ref 11.5–14.5)
EST. GFR  (NO RACE VARIABLE): >60 ML/MIN/1.73 M^2
GLUCOSE SERPL-MCNC: 101 MG/DL (ref 70–110)
HCT VFR BLD AUTO: 45.1 % (ref 40–54)
HGB BLD-MCNC: 14 G/DL (ref 14–18)
IMM GRANULOCYTES # BLD AUTO: 0.03 K/UL (ref 0–0.04)
IMM GRANULOCYTES NFR BLD AUTO: 0.4 % (ref 0–0.5)
LYMPHOCYTES # BLD AUTO: 1.5 K/UL (ref 1–4.8)
LYMPHOCYTES NFR BLD: 19.7 % (ref 18–48)
MCH RBC QN AUTO: 26.6 PG (ref 27–31)
MCHC RBC AUTO-ENTMCNC: 31 G/DL (ref 32–36)
MCV RBC AUTO: 86 FL (ref 82–98)
MONOCYTES # BLD AUTO: 0.7 K/UL (ref 0.3–1)
MONOCYTES NFR BLD: 8.6 % (ref 4–15)
NEUTROPHILS # BLD AUTO: 5.2 K/UL (ref 1.8–7.7)
NEUTROPHILS NFR BLD: 67.2 % (ref 38–73)
NRBC BLD-RTO: 0 /100 WBC
PLATELET # BLD AUTO: 245 K/UL (ref 150–450)
PMV BLD AUTO: 10.2 FL (ref 9.2–12.9)
POTASSIUM SERPL-SCNC: 4.8 MMOL/L (ref 3.5–5.1)
RBC # BLD AUTO: 5.27 M/UL (ref 4.6–6.2)
SODIUM SERPL-SCNC: 144 MMOL/L (ref 136–145)
WBC # BLD AUTO: 7.77 K/UL (ref 3.9–12.7)

## 2022-12-05 PROCEDURE — 99213 PR OFFICE/OUTPT VISIT, EST, LEVL III, 20-29 MIN: ICD-10-PCS | Mod: S$GLB,,, | Performed by: INTERNAL MEDICINE

## 2022-12-05 PROCEDURE — 1159F PR MEDICATION LIST DOCUMENTED IN MEDICAL RECORD: ICD-10-PCS | Mod: CPTII,S$GLB,, | Performed by: INTERNAL MEDICINE

## 2022-12-05 PROCEDURE — 1125F PR PAIN SEVERITY QUANTIFIED, PAIN PRESENT: ICD-10-PCS | Mod: CPTII,S$GLB,, | Performed by: INTERNAL MEDICINE

## 2022-12-05 PROCEDURE — 3078F DIAST BP <80 MM HG: CPT | Mod: CPTII,S$GLB,, | Performed by: INTERNAL MEDICINE

## 2022-12-05 PROCEDURE — 3288F FALL RISK ASSESSMENT DOCD: CPT | Mod: CPTII,S$GLB,, | Performed by: INTERNAL MEDICINE

## 2022-12-05 PROCEDURE — 85025 COMPLETE CBC W/AUTO DIFF WBC: CPT | Performed by: INTERNAL MEDICINE

## 2022-12-05 PROCEDURE — 1159F MED LIST DOCD IN RCRD: CPT | Mod: CPTII,S$GLB,, | Performed by: INTERNAL MEDICINE

## 2022-12-05 PROCEDURE — 80048 BASIC METABOLIC PNL TOTAL CA: CPT | Performed by: INTERNAL MEDICINE

## 2022-12-05 PROCEDURE — 3074F SYST BP LT 130 MM HG: CPT | Mod: CPTII,S$GLB,, | Performed by: INTERNAL MEDICINE

## 2022-12-05 PROCEDURE — 3078F PR MOST RECENT DIASTOLIC BLOOD PRESSURE < 80 MM HG: ICD-10-PCS | Mod: CPTII,S$GLB,, | Performed by: INTERNAL MEDICINE

## 2022-12-05 PROCEDURE — 1100F PR PT FALLS ASSESS DOC 2+ FALLS/FALL W/INJURY/YR: ICD-10-PCS | Mod: CPTII,S$GLB,, | Performed by: INTERNAL MEDICINE

## 2022-12-05 PROCEDURE — 3074F PR MOST RECENT SYSTOLIC BLOOD PRESSURE < 130 MM HG: ICD-10-PCS | Mod: CPTII,S$GLB,, | Performed by: INTERNAL MEDICINE

## 2022-12-05 PROCEDURE — 1100F PTFALLS ASSESS-DOCD GE2>/YR: CPT | Mod: CPTII,S$GLB,, | Performed by: INTERNAL MEDICINE

## 2022-12-05 PROCEDURE — 99213 OFFICE O/P EST LOW 20 MIN: CPT | Mod: S$GLB,,, | Performed by: INTERNAL MEDICINE

## 2022-12-05 PROCEDURE — 3288F PR FALLS RISK ASSESSMENT DOCUMENTED: ICD-10-PCS | Mod: CPTII,S$GLB,, | Performed by: INTERNAL MEDICINE

## 2022-12-05 PROCEDURE — 1125F AMNT PAIN NOTED PAIN PRSNT: CPT | Mod: CPTII,S$GLB,, | Performed by: INTERNAL MEDICINE

## 2022-12-05 RX ORDER — CEPHALEXIN 500 MG/1
500 CAPSULE ORAL 3 TIMES DAILY
Qty: 21 CAPSULE | Refills: 0 | Status: SHIPPED | OUTPATIENT
Start: 2022-12-05 | End: 2022-12-12

## 2022-12-05 NOTE — PROGRESS NOTES
"Subjective:       Patient ID: Giovanni Zavaleta Sr. is a 82 y.o. male.    Chief Complaint: Follow-up    Patient presents today for a 4 month follow-up.      He mentions to me that he is up-to-date with follow-up with ophthalmology and he was told that the treatment for the macular degeneration on the left side the injections were not being as effective on his retina specialist was considering a change in medication if he will be approved by her insurer.  His follow-up appointment is on December 7, 2022 and that will be addressed with the ophthalmologist then.      Vital signs are stable otherwise.      Laboratory evaluation has been reviewed and being that he has not had a CBC since December of 2021 1 is been drawn here today.  Also electrolyte and renal function assessment will be carried out today.    He has a very small area where he had a pimple on his left buttock that is not heal completely and he is being taking care of it with regular soap and water and triple antibiotic ointment.  Because of this chronic be a prescription for 7 days of Keflex 500 mg 3 times a day will be issued today.    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  He is currently undergoing physical therapy at [facility] 2 times a week on appears to be deriving benefit from same.  If he wants any further evaluation regarding this issue he is to contact his primary vascular surgeon Dr. Bolton in [place]        Review of Systems   All other systems reviewed and are negative.      Objective:      Physical Exam  Vitals and nursing note reviewed. Exam conducted with a chaperone present.   Constitutional:       General: He is not in acute distress.     " Appearance: Normal appearance. He is normal weight. He is not ill-appearing, toxic-appearing or diaphoretic.   HENT:      Head: Normocephalic and atraumatic.   Skin:     General: Skin is warm and dry.      Coloration: Skin is not jaundiced or pale.      Comments: Small area of a prior pustule on the left buttock without any signs of acute infection.  Significant intertrigo noted.   Neurological:      Mental Status: He is alert. Mental status is at baseline.      Cranial Nerves: Cranial nerve deficit present.      Sensory: Sensory deficit present.      Motor: Weakness present.      Gait: Gait abnormal.   Psychiatric:         Mood and Affect: Mood normal.         Behavior: Behavior normal.         Thought Content: Thought content normal.         Judgment: Judgment normal.       Assessment:       Problem List Items Addressed This Visit          Ophtho    Exudative age-related macular degeneration of left eye with active choroidal neovascularization - Primary       Cardiac/Vascular    History of AAA (abdominal aortic aneurysm) repair     Other Visit Diagnoses       Frailty syndrome in geriatric patient        Abscess of buttock, left        Anemia, unspecified type        IGT (impaired glucose tolerance)                  Plan:       Patient has been started on empiric Keflex 500 mg 3 times a day for the small on heel pustule on the left buttock.      They can apply Balmex ointment to the area and also to the perineal area after toileting to decrease the chances of intertrigo.      Patient is already wearing incontinence briefs.      Regarding his vascular status is to contact his vascular surgeon for follow-up.      Ophthalmology evaluation regarding his retinal disease on the left eye is coming up on December 7 on decision regarding whether to change the treatment appears to be guided by whether the drug is to be approved by her insurance or not.      Blood pressure is stable.      CBC and BMP have been drawn today for  follow-up and comparison to the lab work from December on the CBC and July on the BMP    Patient is to follow-up at the office mid March of 2023.

## 2022-12-08 ENCOUNTER — TELEPHONE (OUTPATIENT)
Dept: FAMILY MEDICINE | Facility: CLINIC | Age: 82
End: 2022-12-08
Payer: MEDICARE

## 2022-12-08 NOTE — TELEPHONE ENCOUNTER
Wife called to verify when the antibiotic was sent on 12/05/22. It was sent to [facility] and Dr Lozano states it is ok to wait on the medication to arrive from the mailorder.

## 2023-02-22 ENCOUNTER — HOSPITAL ENCOUNTER (OUTPATIENT)
Dept: RADIOLOGY | Facility: CLINIC | Age: 83
Discharge: HOME | End: 2023-02-22
Attending: FAMILY MEDICINE
Payer: MEDICARE

## 2023-02-22 ENCOUNTER — OFFICE VISIT (OUTPATIENT)
Dept: FAMILY MEDICINE | Facility: CLINIC | Age: 83
End: 2023-02-22
Payer: MEDICARE

## 2023-02-22 VITALS
HEIGHT: 67 IN | WEIGHT: 163.81 LBS | SYSTOLIC BLOOD PRESSURE: 118 MMHG | OXYGEN SATURATION: 96 % | HEART RATE: 77 BPM | DIASTOLIC BLOOD PRESSURE: 80 MMHG | TEMPERATURE: 98 F | BODY MASS INDEX: 25.71 KG/M2

## 2023-02-22 DIAGNOSIS — R09.89 CHEST CONGESTION: ICD-10-CM

## 2023-02-22 DIAGNOSIS — R30.0 DYSURIA: Primary | ICD-10-CM

## 2023-02-22 DIAGNOSIS — N39.0 URINARY TRACT INFECTION WITH HEMATURIA, SITE UNSPECIFIED: ICD-10-CM

## 2023-02-22 DIAGNOSIS — R31.9 URINARY TRACT INFECTION WITH HEMATURIA, SITE UNSPECIFIED: ICD-10-CM

## 2023-02-22 LAB
BILIRUBIN, UA POC OHS: NEGATIVE
BLOOD, UA POC OHS: ABNORMAL
CLARITY, UA POC OHS: ABNORMAL
COLOR, UA POC OHS: YELLOW
GLUCOSE, UA POC OHS: NEGATIVE
KETONES, UA POC OHS: NEGATIVE
LEUKOCYTES, UA POC OHS: ABNORMAL
NITRITE, UA POC OHS: POSITIVE
PH, UA POC OHS: 5
PROTEIN, UA POC OHS: NEGATIVE
SPECIFIC GRAVITY, UA POC OHS: 1.02
UROBILINOGEN, UA POC OHS: 0.2

## 2023-02-22 PROCEDURE — 1159F PR MEDICATION LIST DOCUMENTED IN MEDICAL RECORD: ICD-10-PCS | Mod: CPTII,,, | Performed by: FAMILY MEDICINE

## 2023-02-22 PROCEDURE — 3074F PR MOST RECENT SYSTOLIC BLOOD PRESSURE < 130 MM HG: ICD-10-PCS | Mod: CPTII,,, | Performed by: FAMILY MEDICINE

## 2023-02-22 PROCEDURE — 99213 OFFICE O/P EST LOW 20 MIN: CPT | Mod: 25,,, | Performed by: FAMILY MEDICINE

## 2023-02-22 PROCEDURE — 1126F PR PAIN SEVERITY QUANTIFIED, NO PAIN PRESENT: ICD-10-PCS | Mod: CPTII,,, | Performed by: FAMILY MEDICINE

## 2023-02-22 PROCEDURE — 96372 THER/PROPH/DIAG INJ SC/IM: CPT | Mod: ,,, | Performed by: FAMILY MEDICINE

## 2023-02-22 PROCEDURE — 71046 XR CHEST PA AND LATERAL: ICD-10-PCS | Mod: 26,,, | Performed by: RADIOLOGY

## 2023-02-22 PROCEDURE — 1126F AMNT PAIN NOTED NONE PRSNT: CPT | Mod: CPTII,,, | Performed by: FAMILY MEDICINE

## 2023-02-22 PROCEDURE — 87077 CULTURE AEROBIC IDENTIFY: CPT | Performed by: FAMILY MEDICINE

## 2023-02-22 PROCEDURE — 81003 URINALYSIS AUTO W/O SCOPE: CPT | Mod: QW,,, | Performed by: FAMILY MEDICINE

## 2023-02-22 PROCEDURE — 96372 PR INJECTION,THERAP/PROPH/DIAG2ST, IM OR SUBCUT: ICD-10-PCS | Mod: ,,, | Performed by: FAMILY MEDICINE

## 2023-02-22 PROCEDURE — 71046 X-RAY EXAM CHEST 2 VIEWS: CPT | Mod: 26,,, | Performed by: RADIOLOGY

## 2023-02-22 PROCEDURE — 71046 X-RAY EXAM CHEST 2 VIEWS: CPT | Mod: TC,,, | Performed by: FAMILY MEDICINE

## 2023-02-22 PROCEDURE — 1159F MED LIST DOCD IN RCRD: CPT | Mod: CPTII,,, | Performed by: FAMILY MEDICINE

## 2023-02-22 PROCEDURE — 71046 XR CHEST PA AND LATERAL: ICD-10-PCS | Mod: TC,,, | Performed by: FAMILY MEDICINE

## 2023-02-22 PROCEDURE — 3079F DIAST BP 80-89 MM HG: CPT | Mod: CPTII,,, | Performed by: FAMILY MEDICINE

## 2023-02-22 PROCEDURE — 99213 PR OFFICE/OUTPT VISIT, EST, LEVL III, 20-29 MIN: ICD-10-PCS | Mod: 25,,, | Performed by: FAMILY MEDICINE

## 2023-02-22 PROCEDURE — 81003 POCT URINALYSIS(INSTRUMENT): ICD-10-PCS | Mod: QW,,, | Performed by: FAMILY MEDICINE

## 2023-02-22 PROCEDURE — 87086 URINE CULTURE/COLONY COUNT: CPT | Performed by: FAMILY MEDICINE

## 2023-02-22 PROCEDURE — 87186 SC STD MICRODIL/AGAR DIL: CPT | Performed by: FAMILY MEDICINE

## 2023-02-22 PROCEDURE — 3074F SYST BP LT 130 MM HG: CPT | Mod: CPTII,,, | Performed by: FAMILY MEDICINE

## 2023-02-22 PROCEDURE — 87088 URINE BACTERIA CULTURE: CPT | Performed by: FAMILY MEDICINE

## 2023-02-22 PROCEDURE — 3079F PR MOST RECENT DIASTOLIC BLOOD PRESSURE 80-89 MM HG: ICD-10-PCS | Mod: CPTII,,, | Performed by: FAMILY MEDICINE

## 2023-02-22 RX ORDER — GUAIFENESIN 1200 MG/1
1 TABLET, EXTENDED RELEASE ORAL 2 TIMES DAILY
Qty: 20 TABLET | Refills: 1 | Status: SHIPPED | OUTPATIENT
Start: 2023-02-22 | End: 2023-03-04

## 2023-02-22 RX ORDER — SULFAMETHOXAZOLE AND TRIMETHOPRIM 800; 160 MG/1; MG/1
1 TABLET ORAL 2 TIMES DAILY
Qty: 20 TABLET | Refills: 0 | Status: SHIPPED | OUTPATIENT
Start: 2023-02-22 | End: 2023-03-16

## 2023-02-22 RX ORDER — CEFTRIAXONE 1 G/1
1 INJECTION, POWDER, FOR SOLUTION INTRAMUSCULAR; INTRAVENOUS
Status: COMPLETED | OUTPATIENT
Start: 2023-02-22 | End: 2023-02-22

## 2023-02-22 RX ADMIN — CEFTRIAXONE 1 G: 1 INJECTION, POWDER, FOR SOLUTION INTRAMUSCULAR; INTRAVENOUS at 12:02

## 2023-02-22 NOTE — PROGRESS NOTES
Subjective:       Patient ID: Giovanni Zavaleta Ronald is a 82 y.o. male.    Chief Complaint: Urinary Tract Infection and URI (C/o chest congestion x2weeks/C/o dysuria [id number])    Mr Zavaleta is here with dysuria for a few weeks as well as cough. He also is incontinent of urine and wears a diaper, and is on ditropan. He has no other significant complaints. His routine PCP is Melchor Collier. He will follow up with Dr Lozano for any other chronic issues    Urinary Tract Infection   Associated symptoms include frequency and urgency. Pertinent negatives include no chills or rash.   URI   Associated symptoms include coughing and dysuria. Pertinent negatives include no abdominal pain, chest pain, congestion, ear pain, rash, rhinorrhea or sore throat.   Review of Systems   Constitutional:  Negative for activity change, appetite change, chills, diaphoresis and fever.   HENT:  Negative for congestion, ear pain, postnasal drip, rhinorrhea and sore throat.    Respiratory:  Positive for cough. Negative for shortness of breath.    Cardiovascular:  Negative for chest pain, palpitations and leg swelling.   Gastrointestinal:  Negative for abdominal distention and abdominal pain.   Genitourinary:  Positive for dysuria, frequency and urgency.   Skin:  Negative for color change, rash and wound.     Patient Active Problem List   Diagnosis    Exudative age-related macular degeneration of left eye with active choroidal neovascularization    Nonexudative age-related macular degeneration, right eye, intermediate dry stage    NS (nuclear sclerosis)    BPH with urinary obstruction    Peripheral vascular disease    History of AAA (abdominal aortic aneurysm) repair       Objective:      Physical Exam  Vitals and nursing note reviewed.   Constitutional:       Appearance: He is well-developed.   HENT:      Right Ear: Tympanic membrane and ear canal normal.      Left Ear: Tympanic membrane and ear canal normal.      Nose: Mucosal edema and  "rhinorrhea present.      Right Sinus: No maxillary sinus tenderness or frontal sinus tenderness.      Left Sinus: No maxillary sinus tenderness or frontal sinus tenderness.      Mouth/Throat:      Pharynx: Posterior oropharyngeal erythema present. No oropharyngeal exudate.      Tonsils: No tonsillar abscesses.   Cardiovascular:      Rate and Rhythm: Normal rate and regular rhythm.      Heart sounds: Normal heart sounds.   Pulmonary:      Effort: Pulmonary effort is normal.      Breath sounds: Normal breath sounds.   Genitourinary:     Comments: UTI  Skin:     General: Skin is warm and dry.   Neurological:      Mental Status: Mental status is at baseline.   Psychiatric:         Mood and Affect: Mood normal.         Behavior: Behavior normal.       Lab Results   Component Value Date    WBC 7.77 12/05/2022    HGB 14.0 12/05/2022    HCT 45.1 12/05/2022     12/05/2022    CHOL 130 07/26/2022    TRIG 101 07/26/2022    HDL 37 (L) 07/26/2022    ALT 10 07/26/2022    AST 12 07/26/2022     12/05/2022    K 4.8 12/05/2022     12/05/2022    CREATININE 1.1 12/05/2022    BUN 23 12/05/2022    CO2 27 12/05/2022    TSH 2.647 12/07/2021    HGBA1C 5.6 12/07/2021     The ASCVD Risk score (Leslie DK, et al., 2019) failed to calculate for the following reasons:    The 2019 ASCVD risk score is only valid for ages 40 to 79  Visit Vitals  /80 (BP Location: Right arm, Patient Position: Sitting, BP Method: Large (Manual))   Pulse 77   Temp 97.7 °F (36.5 °C)   Ht 5' 7" (1.702 m)   Wt 74.3 kg (163 lb 12.8 oz)   SpO2 96%   BMI 25.66 kg/m²      Assessment:       1. Dysuria    2. Urinary tract infection with hematuria, site unspecified    3. Chest congestion          Plan:       1. Dysuria  -     POCT Urinalysis(Instrument)  -     sulfamethoxazole-trimethoprim 800-160mg (BACTRIM DS) 800-160 mg Tab; Take 1 tablet by mouth 2 (two) times daily.  Dispense: 20 tablet; Refill: 0    2. Urinary tract infection with hematuria, site " unspecified  -     sulfamethoxazole-trimethoprim 800-160mg (BACTRIM DS) 800-160 mg Tab; Take 1 tablet by mouth 2 (two) times daily.  Dispense: 20 tablet; Refill: 0  -     Urine culture    3. Chest congestion  -     X-Ray Chest PA And Lateral; Future; Expected date: 02/22/2023    Other orders  -     guaiFENesin 1,200 mg Ta12; Take 1 tablet by mouth 2 (two) times daily. With 8 oz water for 10 days  Dispense: 20 tablet; Refill: 1  -     cefTRIAXone injection 1 g    In excessive of 30 minutes total time spent for evaluation and management services. Time included elements of the following: time spent preparing to see patient, obtaining and reviewing separately obtained history, exam, evaluation, counseling and education of patient/family member or care giver, documenting in the HMR, independently interpreting results and communication of results, coordination of care ordering medications, tests, or procedures, referral and communication to other health care professionals.           Follow up if symptoms worsen or fail to improve.

## 2023-02-24 LAB — BACTERIA UR CULT: ABNORMAL

## 2023-02-27 DIAGNOSIS — N39.0 URINARY TRACT INFECTION WITH HEMATURIA, SITE UNSPECIFIED: Primary | ICD-10-CM

## 2023-02-27 DIAGNOSIS — R31.9 URINARY TRACT INFECTION WITH HEMATURIA, SITE UNSPECIFIED: Primary | ICD-10-CM

## 2023-03-01 ENCOUNTER — TELEPHONE (OUTPATIENT)
Dept: FAMILY MEDICINE | Facility: CLINIC | Age: 83
End: 2023-03-01
Payer: MEDICARE

## 2023-03-01 NOTE — TELEPHONE ENCOUNTER
Spoke to wife scheduled nurse visit for u/a and given her the information.    ----- Message from Leanna Hebert MD sent at 2/27/2023  7:55 AM CST -----  Urine culture is (+) for Ecoli, which is sensitive to Bactrim. We can repeat the UA a week or two after meds completed to make sure infection has cleared

## 2023-03-09 ENCOUNTER — CLINICAL SUPPORT (OUTPATIENT)
Dept: FAMILY MEDICINE | Facility: CLINIC | Age: 83
End: 2023-03-09
Payer: MEDICARE

## 2023-03-09 ENCOUNTER — OFFICE VISIT (OUTPATIENT)
Dept: FAMILY MEDICINE | Facility: CLINIC | Age: 83
End: 2023-03-09
Payer: MEDICARE

## 2023-03-09 VITALS
WEIGHT: 150 LBS | DIASTOLIC BLOOD PRESSURE: 60 MMHG | BODY MASS INDEX: 23.54 KG/M2 | HEIGHT: 67 IN | HEART RATE: 80 BPM | RESPIRATION RATE: 18 BRPM | OXYGEN SATURATION: 95 % | SYSTOLIC BLOOD PRESSURE: 102 MMHG

## 2023-03-09 VITALS — SYSTOLIC BLOOD PRESSURE: 130 MMHG | DIASTOLIC BLOOD PRESSURE: 60 MMHG

## 2023-03-09 DIAGNOSIS — B35.6 TINEA CRURIS: ICD-10-CM

## 2023-03-09 DIAGNOSIS — R31.9 URINARY TRACT INFECTION WITH HEMATURIA, SITE UNSPECIFIED: ICD-10-CM

## 2023-03-09 DIAGNOSIS — R32 URINARY INCONTINENCE, UNSPECIFIED TYPE: ICD-10-CM

## 2023-03-09 DIAGNOSIS — D64.9 ANEMIA, UNSPECIFIED TYPE: ICD-10-CM

## 2023-03-09 DIAGNOSIS — R73.02 IGT (IMPAIRED GLUCOSE TOLERANCE): ICD-10-CM

## 2023-03-09 DIAGNOSIS — Z79.899 ENCOUNTER FOR LONG-TERM (CURRENT) USE OF OTHER MEDICATIONS: ICD-10-CM

## 2023-03-09 DIAGNOSIS — E03.9 HYPOTHYROIDISM, UNSPECIFIED TYPE: ICD-10-CM

## 2023-03-09 DIAGNOSIS — N18.30 STAGE 3 CHRONIC KIDNEY DISEASE, UNSPECIFIED WHETHER STAGE 3A OR 3B CKD: ICD-10-CM

## 2023-03-09 DIAGNOSIS — R63.4 RECENT UNEXPLAINED WEIGHT LOSS: Primary | ICD-10-CM

## 2023-03-09 DIAGNOSIS — R54 FRAILTY SYNDROME IN GERIATRIC PATIENT: ICD-10-CM

## 2023-03-09 DIAGNOSIS — H35.3221 EXUDATIVE AGE-RELATED MACULAR DEGENERATION OF LEFT EYE WITH ACTIVE CHOROIDAL NEOVASCULARIZATION: ICD-10-CM

## 2023-03-09 DIAGNOSIS — N39.0 URINARY TRACT INFECTION WITH HEMATURIA, SITE UNSPECIFIED: ICD-10-CM

## 2023-03-09 LAB
ALBUMIN SERPL BCP-MCNC: 3.9 G/DL (ref 3.5–5.2)
ALP SERPL-CCNC: 54 U/L (ref 55–135)
ALT SERPL W/O P-5'-P-CCNC: 9 U/L (ref 10–44)
ANION GAP SERPL CALC-SCNC: 8 MMOL/L (ref 8–16)
AST SERPL-CCNC: 10 U/L (ref 10–40)
BASOPHILS # BLD AUTO: 0.04 K/UL (ref 0–0.2)
BASOPHILS NFR BLD: 0.6 % (ref 0–1.9)
BILIRUB SERPL-MCNC: 0.3 MG/DL (ref 0.1–1)
BILIRUB UR QL STRIP: NEGATIVE
BUN SERPL-MCNC: 25 MG/DL (ref 8–23)
CALCIUM SERPL-MCNC: 9.5 MG/DL (ref 8.7–10.5)
CHLORIDE SERPL-SCNC: 109 MMOL/L (ref 95–110)
CLARITY UR: CLEAR
CO2 SERPL-SCNC: 28 MMOL/L (ref 23–29)
COLOR UR: YELLOW
CREAT SERPL-MCNC: 1.4 MG/DL (ref 0.5–1.4)
DIFFERENTIAL METHOD: ABNORMAL
EOSINOPHIL # BLD AUTO: 0.3 K/UL (ref 0–0.5)
EOSINOPHIL NFR BLD: 4.6 % (ref 0–8)
ERYTHROCYTE [DISTWIDTH] IN BLOOD BY AUTOMATED COUNT: 13.9 % (ref 11.5–14.5)
EST. GFR  (NO RACE VARIABLE): 50 ML/MIN/1.73 M^2
GLUCOSE SERPL-MCNC: 120 MG/DL (ref 70–110)
GLUCOSE UR QL STRIP: NEGATIVE
HCT VFR BLD AUTO: 42.1 % (ref 40–54)
HGB BLD-MCNC: 12.8 G/DL (ref 14–18)
HGB UR QL STRIP: NEGATIVE
IMM GRANULOCYTES # BLD AUTO: 0.04 K/UL (ref 0–0.04)
IMM GRANULOCYTES NFR BLD AUTO: 0.6 % (ref 0–0.5)
KETONES UR QL STRIP: NEGATIVE
LEUKOCYTE ESTERASE UR QL STRIP: NEGATIVE
LYMPHOCYTES # BLD AUTO: 1.5 K/UL (ref 1–4.8)
LYMPHOCYTES NFR BLD: 21 % (ref 18–48)
MCH RBC QN AUTO: 26.2 PG (ref 27–31)
MCHC RBC AUTO-ENTMCNC: 30.4 G/DL (ref 32–36)
MCV RBC AUTO: 86 FL (ref 82–98)
MONOCYTES # BLD AUTO: 0.7 K/UL (ref 0.3–1)
MONOCYTES NFR BLD: 9.5 % (ref 4–15)
NEUTROPHILS # BLD AUTO: 4.4 K/UL (ref 1.8–7.7)
NEUTROPHILS NFR BLD: 63.7 % (ref 38–73)
NITRITE UR QL STRIP: NEGATIVE
NRBC BLD-RTO: 0 /100 WBC
PH UR STRIP: 6 [PH] (ref 5–8)
PLATELET # BLD AUTO: 238 K/UL (ref 150–450)
PMV BLD AUTO: 9.9 FL (ref 9.2–12.9)
POTASSIUM SERPL-SCNC: 4.2 MMOL/L (ref 3.5–5.1)
PROT SERPL-MCNC: 6.2 G/DL (ref 6–8.4)
PROT UR QL STRIP: NEGATIVE
RBC # BLD AUTO: 4.88 M/UL (ref 4.6–6.2)
SODIUM SERPL-SCNC: 145 MMOL/L (ref 136–145)
SP GR UR STRIP: 1.02 (ref 1–1.03)
TSH SERPL DL<=0.005 MIU/L-ACNC: 2.82 UIU/ML (ref 0.4–4)
URN SPEC COLLECT METH UR: NORMAL
UROBILINOGEN UR STRIP-ACNC: NEGATIVE EU/DL
WBC # BLD AUTO: 6.92 K/UL (ref 3.9–12.7)

## 2023-03-09 PROCEDURE — 99213 PR OFFICE/OUTPT VISIT, EST, LEVL III, 20-29 MIN: ICD-10-PCS | Mod: S$GLB,,, | Performed by: INTERNAL MEDICINE

## 2023-03-09 PROCEDURE — 80053 COMPREHEN METABOLIC PANEL: CPT | Performed by: INTERNAL MEDICINE

## 2023-03-09 PROCEDURE — 81003 URINALYSIS AUTO W/O SCOPE: CPT | Performed by: FAMILY MEDICINE

## 2023-03-09 PROCEDURE — 3078F DIAST BP <80 MM HG: CPT | Mod: CPTII,S$GLB,, | Performed by: INTERNAL MEDICINE

## 2023-03-09 PROCEDURE — 3288F PR FALLS RISK ASSESSMENT DOCUMENTED: ICD-10-PCS | Mod: CPTII,S$GLB,, | Performed by: INTERNAL MEDICINE

## 2023-03-09 PROCEDURE — 1159F MED LIST DOCD IN RCRD: CPT | Mod: CPTII,S$GLB,, | Performed by: INTERNAL MEDICINE

## 2023-03-09 PROCEDURE — 3078F PR MOST RECENT DIASTOLIC BLOOD PRESSURE < 80 MM HG: ICD-10-PCS | Mod: CPTII,S$GLB,, | Performed by: INTERNAL MEDICINE

## 2023-03-09 PROCEDURE — 1101F PR PT FALLS ASSESS DOC 0-1 FALLS W/OUT INJ PAST YR: ICD-10-PCS | Mod: CPTII,S$GLB,, | Performed by: INTERNAL MEDICINE

## 2023-03-09 PROCEDURE — 1101F PT FALLS ASSESS-DOCD LE1/YR: CPT | Mod: CPTII,S$GLB,, | Performed by: INTERNAL MEDICINE

## 2023-03-09 PROCEDURE — 3074F PR MOST RECENT SYSTOLIC BLOOD PRESSURE < 130 MM HG: ICD-10-PCS | Mod: CPTII,S$GLB,, | Performed by: INTERNAL MEDICINE

## 2023-03-09 PROCEDURE — 84443 ASSAY THYROID STIM HORMONE: CPT | Performed by: INTERNAL MEDICINE

## 2023-03-09 PROCEDURE — 85025 COMPLETE CBC W/AUTO DIFF WBC: CPT | Performed by: INTERNAL MEDICINE

## 2023-03-09 PROCEDURE — 83036 HEMOGLOBIN GLYCOSYLATED A1C: CPT | Performed by: INTERNAL MEDICINE

## 2023-03-09 PROCEDURE — 1125F PR PAIN SEVERITY QUANTIFIED, PAIN PRESENT: ICD-10-PCS | Mod: CPTII,S$GLB,, | Performed by: INTERNAL MEDICINE

## 2023-03-09 PROCEDURE — 1159F PR MEDICATION LIST DOCUMENTED IN MEDICAL RECORD: ICD-10-PCS | Mod: CPTII,S$GLB,, | Performed by: INTERNAL MEDICINE

## 2023-03-09 PROCEDURE — 99213 OFFICE O/P EST LOW 20 MIN: CPT | Mod: S$GLB,,, | Performed by: INTERNAL MEDICINE

## 2023-03-09 PROCEDURE — 1125F AMNT PAIN NOTED PAIN PRSNT: CPT | Mod: CPTII,S$GLB,, | Performed by: INTERNAL MEDICINE

## 2023-03-09 PROCEDURE — 3074F SYST BP LT 130 MM HG: CPT | Mod: CPTII,S$GLB,, | Performed by: INTERNAL MEDICINE

## 2023-03-09 PROCEDURE — 3288F FALL RISK ASSESSMENT DOCD: CPT | Mod: CPTII,S$GLB,, | Performed by: INTERNAL MEDICINE

## 2023-03-09 NOTE — PROGRESS NOTES
Pt seen today for urine collection and BP check. Pt successful with urine collection. Specimen sent to lab.     Giovannicelestina Zavaleta Sr. 82 y.o. male is here today for Blood Pressure check.   History of HTN yes.    Review of patient's allergies indicates:   Allergen Reactions    Pravastatin Rash     Creatinine   Date Value Ref Range Status   12/05/2022 1.1 0.5 - 1.4 mg/dL Final     Sodium   Date Value Ref Range Status   12/05/2022 144 136 - 145 mmol/L Final     Potassium   Date Value Ref Range Status   12/05/2022 4.8 3.5 - 5.1 mmol/L Final   ]  Patient verifies taking blood pressure medications on a regular basis at the same time of the day.     Current Outpatient Medications:     albuterol (PROVENTIL/VENTOLIN HFA) 90 mcg/actuation inhaler, Inhale 2 puffs into the lungs 4 (four) times daily., Disp: 18 g, Rfl: 1    ascorbic acid, vitamin C, (VITAMIN C) 100 MG tablet, Take 100 mg by mouth once daily., Disp: , Rfl:     aspirin 81 MG Chew, Chew 1 tablet every day by oral route., Disp: , Rfl:     ciclopirox (PENLAC) 8 % Soln, Apply over nail and surrounding skin. Apply daily over previous coat. After seven (7) days, may remove with alcohol and continue cycle., Disp: , Rfl:     coenzyme Q10 100 mg capsule, Take 100 mg by mouth once daily., Disp: , Rfl:     fenofibrate 160 MG Tab, Take 1 tablet (160 mg total) by mouth once daily., Disp: 90 tablet, Rfl: 1    finasteride (PROSCAR) 5 mg tablet, Take 1 tablet (5 mg total) by mouth once daily., Disp: 90 tablet, Rfl: 6    fluticasone propionate (FLONASE) 50 mcg/actuation nasal spray, 2 sprays (100 mcg total) by Each Nostril route once daily at 6am., Disp: 30 g, Rfl: 3    hydrocortisone 1 % lotion, Apply topically 2 (two) times daily., Disp: , Rfl:     ketorolac 0.5% (ACULAR) 0.5 % Drop, INSTILL 1 DROP INTO BOTH EYES 4 TIMES A DAY, Disp: , Rfl:     labetaloL (NORMODYNE) 200 MG tablet, Take 1 tablet by mouth once a day, Disp: 90 tablet, Rfl: 1    LORazepam (ATIVAN) 1 MG tablet, Take  1-2 mg by mouth., Disp: , Rfl:     melatonin 3 mg Cap, Take 1 capsule by mouth every evening., Disp: , Rfl:     oxybutynin (DITROPAN) 5 MG Tab, TAKE 1 TABLET ONE TIME DAILY, Disp: 90 tablet, Rfl: 1    rosuvastatin (CRESTOR) 10 MG tablet, TAKE 1 TABLET EVERY EVENING, Disp: 90 tablet, Rfl: 1    sulfamethoxazole-trimethoprim 800-160mg (BACTRIM DS) 800-160 mg Tab, Take 1 tablet by mouth 2 (two) times daily., Disp: 20 tablet, Rfl: 0    tamsulosin (FLOMAX) 0.4 mg Cap, Take 1 capsule (0.4 mg total) by mouth once daily., Disp: 90 capsule, Rfl: 3    triamcinolone acetonide 0.1% (KENALOG) 0.1 % cream, Apply topically 2 (two) times daily., Disp: , Rfl:     vit A,C & E-lutein-minerals 1,000-60-2 unit (OCUVITE WITH LUTEIN) Tab, Take 1 tablet by mouth once daily., Disp: , Rfl:     vitamin D (VITAMIN D3) 1000 units Tab, Take by mouth., Disp: , Rfl:   Does patient have record of home blood pressure readings no. Readings have been averaging n/a.   Last dose of blood pressure medication was taken at AM.  Patient is asymptomatic.   Complains of low bp.    BP: 92/60.  Blood pressure reading after 15 minutes and change of position was 130/60.  Dr. Melchor Collier notified.

## 2023-03-09 NOTE — PROGRESS NOTES
Subjective:       Patient ID: Giovanni Zavaleta Sr. is a 82 y.o. male.    Chief Complaint: Follow-up (Pt presents to the clinic for a 3m f/u)    Patient presents the company of the wife because of concerns over weight loss.  There has been no signs suggestive for an acute respiratory gastrointestinal or any major organ system ab now adding to 13 lb since February 22, 2023 when he was 163 lb on today at time of examination is 150 with the same digital scale.      Patient does not have any nausea vomiting diarrhea fever chills gross signs of bleeding from any orifice or significant change in his stamina.    Plans are for lab work to be performed here today to include hemoglobin A1c, TSH, comprehensive metabolic panel, CBC and urinalysis was obtained which was unremarkable.    Besides the concern with the chronic irritation in the all area, perineum and perianal area most probably related to yeast an or exposure to urine, despite the use of bowel makes and wearing incontinence briefs, there is no other abnormality addressed today.    At time of examination, no signs of bleeding in the perianal area and has some small flat hemorrhoids if at all.  Patient is very well-kept.  We had a discussion regarding this again and besides the use of the bowel makes they can try Monistat 7 over-the-counter in case this is also related to yeast because of the moisture in the area.    They can also seek attention with Gastroenterology if they deem necessary because of the perianal medication discomfort.    Medication list has been reviewed and there is no need for any refills at this time.  Specifically no antibiotic therapy is to be prescribed.    Review of Systems   All other systems reviewed and are negative.      Objective:      Physical Exam  Vitals and nursing note reviewed. Exam conducted with a chaperone present.   Constitutional:       General: He is not in acute distress.     Appearance: Normal appearance. He is normal  weight. He is not ill-appearing, toxic-appearing or diaphoretic.   HENT:      Head: Normocephalic and atraumatic.   Cardiovascular:      Rate and Rhythm: Normal rate.      Pulses: Normal pulses.   Pulmonary:      Effort: Pulmonary effort is normal.   Skin:     General: Skin is warm and dry.      Coloration: Skin is not jaundiced or pale.      Comments: As per HPI     Neurological:      Mental Status: He is alert. Mental status is at baseline.      Cranial Nerves: Cranial nerve deficit present.      Sensory: Sensory deficit present.      Motor: Weakness present.      Coordination: Coordination normal.      Gait: Gait abnormal.   Psychiatric:         Mood and Affect: Mood normal.         Behavior: Behavior normal.         Thought Content: Thought content normal.         Judgment: Judgment normal.       Assessment:       Problem List Items Addressed This Visit          Ophtho    Exudative age-related macular degeneration of left eye with active choroidal neovascularization       Renal/    Stage 3 chronic kidney disease, unspecified whether stage 3a or 3b CKD     Other Visit Diagnoses       Recent unexplained weight loss    -  Primary    IGT (impaired glucose tolerance)        Relevant Orders    Hemoglobin A1C (Completed)    Anemia, unspecified type        Relevant Orders    CBC Auto Differential (Completed)    Hypothyroidism, unspecified type        Relevant Orders    TSH (Completed)    Encounter for long-term (current) use of other medications        Relevant Orders    Comprehensive Metabolic Panel (Completed)    Frailty syndrome in geriatric patient        Tinea cruris        Urinary incontinence, unspecified type                  Plan:       Laboratory evaluation we obtained here now for starting a workup for this unexplained weight loss.  There is no symptomatology that suggest a specific organ system as the main culprit.  The lab will help direct the next step in the workup.    Can seek attention with  Gastroenterology with regards to the perianal concerns if they so desire.  Besides the use of the Monistat 7 to the scrotal perineal and perianal area, trying to keep him dry as much as possible because of the difficulty with continence, not have anything else to offer regarding this concerns.      Vital signs are otherwise stable.      Medication list was reviewed and there is no need for any refills.

## 2023-03-10 LAB
ESTIMATED AVG GLUCOSE: 117 MG/DL (ref 68–131)
HBA1C MFR BLD: 5.7 % (ref 4–5.6)

## 2023-03-14 ENCOUNTER — TELEPHONE (OUTPATIENT)
Dept: UROLOGY | Facility: CLINIC | Age: 83
End: 2023-03-14
Payer: MEDICARE

## 2023-03-14 NOTE — TELEPHONE ENCOUNTER
Spoke w pts wife she had called to inquire for sooner appt regarding pts symptoms.  Pts wife offered with Np with symptoms of poss uti and request for psa   Pts wife accepted appt with NP on thursday

## 2023-03-14 NOTE — TELEPHONE ENCOUNTER
----- Message from Lore Walters sent at 3/14/2023  9:59 AM CDT -----  Contact: SELF  Type: Needs Medical Advice  Who Called: patient    Best Call Back Number: [phone] or [phone]  Additional Information: Pt needs PSA test done and he is losing a ton of weight and barely eating. Pt states he only wants to see Dr. Toledo only for this states he feels something is wrong. Plz call to get pt scheduled sooner than May. Thanks

## 2023-03-16 ENCOUNTER — OFFICE VISIT (OUTPATIENT)
Dept: UROLOGY | Facility: CLINIC | Age: 83
End: 2023-03-16
Payer: MEDICARE

## 2023-03-16 ENCOUNTER — HOSPITAL ENCOUNTER (OUTPATIENT)
Dept: RADIOLOGY | Facility: HOSPITAL | Age: 83
Discharge: HOME | End: 2023-03-16
Attending: NURSE PRACTITIONER
Payer: MEDICARE

## 2023-03-16 VITALS
SYSTOLIC BLOOD PRESSURE: 119 MMHG | WEIGHT: 138.88 LBS | DIASTOLIC BLOOD PRESSURE: 63 MMHG | HEART RATE: 64 BPM | HEIGHT: 67 IN | BODY MASS INDEX: 21.8 KG/M2

## 2023-03-16 DIAGNOSIS — N13.8 BPH WITH URINARY OBSTRUCTION: ICD-10-CM

## 2023-03-16 DIAGNOSIS — R39.9 UTI SYMPTOMS: ICD-10-CM

## 2023-03-16 DIAGNOSIS — N50.811 PAIN IN RIGHT TESTICLE: ICD-10-CM

## 2023-03-16 DIAGNOSIS — N40.1 BPH WITH URINARY OBSTRUCTION: Primary | ICD-10-CM

## 2023-03-16 DIAGNOSIS — N40.1 BPH WITH URINARY OBSTRUCTION: ICD-10-CM

## 2023-03-16 DIAGNOSIS — N13.8 BPH WITH URINARY OBSTRUCTION: Primary | ICD-10-CM

## 2023-03-16 LAB
BILIRUBIN, UA POC OHS: NEGATIVE
BLOOD, UA POC OHS: NEGATIVE
CLARITY, UA POC OHS: CLEAR
COLOR, UA POC OHS: YELLOW
GLUCOSE, UA POC OHS: NEGATIVE
KETONES, UA POC OHS: NEGATIVE
LEUKOCYTES, UA POC OHS: NEGATIVE
NITRITE, UA POC OHS: NEGATIVE
PH, UA POC OHS: 6
PROTEIN, UA POC OHS: NEGATIVE
SPECIFIC GRAVITY, UA POC OHS: 1.02
UROBILINOGEN, UA POC OHS: 0.2

## 2023-03-16 PROCEDURE — 1160F RVW MEDS BY RX/DR IN RCRD: CPT | Mod: CPTII,S$GLB,, | Performed by: NURSE PRACTITIONER

## 2023-03-16 PROCEDURE — 1101F PT FALLS ASSESS-DOCD LE1/YR: CPT | Mod: CPTII,S$GLB,, | Performed by: NURSE PRACTITIONER

## 2023-03-16 PROCEDURE — 1159F PR MEDICATION LIST DOCUMENTED IN MEDICAL RECORD: ICD-10-PCS | Mod: CPTII,S$GLB,, | Performed by: NURSE PRACTITIONER

## 2023-03-16 PROCEDURE — 3288F FALL RISK ASSESSMENT DOCD: CPT | Mod: CPTII,S$GLB,, | Performed by: NURSE PRACTITIONER

## 2023-03-16 PROCEDURE — 76870 US SCROTUM AND TESTICLES: ICD-10-PCS | Mod: 26,,, | Performed by: RADIOLOGY

## 2023-03-16 PROCEDURE — 76870 US EXAM SCROTUM: CPT | Mod: 26,,, | Performed by: RADIOLOGY

## 2023-03-16 PROCEDURE — 1160F PR REVIEW ALL MEDS BY PRESCRIBER/CLIN PHARMACIST DOCUMENTED: ICD-10-PCS | Mod: CPTII,S$GLB,, | Performed by: NURSE PRACTITIONER

## 2023-03-16 PROCEDURE — 81003 URINALYSIS AUTO W/O SCOPE: CPT | Mod: QW,S$GLB,, | Performed by: NURSE PRACTITIONER

## 2023-03-16 PROCEDURE — 99214 PR OFFICE/OUTPT VISIT, EST, LEVL IV, 30-39 MIN: ICD-10-PCS | Mod: S$GLB,,, | Performed by: NURSE PRACTITIONER

## 2023-03-16 PROCEDURE — 3078F DIAST BP <80 MM HG: CPT | Mod: CPTII,S$GLB,, | Performed by: NURSE PRACTITIONER

## 2023-03-16 PROCEDURE — 3074F SYST BP LT 130 MM HG: CPT | Mod: CPTII,S$GLB,, | Performed by: NURSE PRACTITIONER

## 2023-03-16 PROCEDURE — 1159F MED LIST DOCD IN RCRD: CPT | Mod: CPTII,S$GLB,, | Performed by: NURSE PRACTITIONER

## 2023-03-16 PROCEDURE — 3288F PR FALLS RISK ASSESSMENT DOCUMENTED: ICD-10-PCS | Mod: CPTII,S$GLB,, | Performed by: NURSE PRACTITIONER

## 2023-03-16 PROCEDURE — 99214 OFFICE O/P EST MOD 30 MIN: CPT | Mod: S$GLB,,, | Performed by: NURSE PRACTITIONER

## 2023-03-16 PROCEDURE — 1101F PR PT FALLS ASSESS DOC 0-1 FALLS W/OUT INJ PAST YR: ICD-10-PCS | Mod: CPTII,S$GLB,, | Performed by: NURSE PRACTITIONER

## 2023-03-16 PROCEDURE — 99999 PR PBB SHADOW E&M-EST. PATIENT-LVL IV: ICD-10-PCS | Mod: PBBFAC,,, | Performed by: NURSE PRACTITIONER

## 2023-03-16 PROCEDURE — 76870 US EXAM SCROTUM: CPT | Mod: TC

## 2023-03-16 PROCEDURE — 99999 PR PBB SHADOW E&M-EST. PATIENT-LVL IV: CPT | Mod: PBBFAC,,, | Performed by: NURSE PRACTITIONER

## 2023-03-16 PROCEDURE — 81003 POCT URINALYSIS(INSTRUMENT): ICD-10-PCS | Mod: QW,S$GLB,, | Performed by: NURSE PRACTITIONER

## 2023-03-16 PROCEDURE — 3078F PR MOST RECENT DIASTOLIC BLOOD PRESSURE < 80 MM HG: ICD-10-PCS | Mod: CPTII,S$GLB,, | Performed by: NURSE PRACTITIONER

## 2023-03-16 PROCEDURE — 3074F PR MOST RECENT SYSTOLIC BLOOD PRESSURE < 130 MM HG: ICD-10-PCS | Mod: CPTII,S$GLB,, | Performed by: NURSE PRACTITIONER

## 2023-03-16 NOTE — PROGRESS NOTES
[facility] Note  Staff: CADE Philippe-C    PCP: ELAINE Collier.  Urologist:  ANAIS Toledo    Chief Complaint: UTI F/UP    Subjective:        HPI: Giovanni Zavaleta Sr. is a 82 y.o. male presents today for recheck of recent UTI at this time.  Pt is accompanied by his wife during ov today.    Recent UTI:  On 02/22/23 the pt was recently evaluated and treated by Dr. Hebert for +UTI of E. Coli >100,000 at that time.  Pt was prescribed Bactrim DS BID for infection and has now completed all the antibiotics as of today's office visit.    No gross hematuria or dysuria complaints today.  Current  meds:  Finasteride 5 mg in am and Tamsulosin 0.4 mg in pm for BPH with LUTS.    Pt's wife is requesting his PSA level to be checked today due to within the last 6 mos to one year the pt has had an large amount of weight loss.  Last Colonoscopy he states was 2 years ago.    UA performed in office today showed normal findings at this time.  Pt today also c/o Right Testicle pain since infection.     HX:   Pt was last evaluated by me on 9/12/22.  Last ov with MD (10/14/2019) he finds his lower urinary tract symptoms stable on dual medical therapy with Flomax and finasteride.  He does still have some urgency and frequency though these are both better since last evaluation.  He was unable to afford the Myrbetriq and is on oxybutynin.  We did discuss the concerns about oxybutynin at his age with dry eyes, dry mouth, constipation, changes in mentation and risk of dementia.      Cysto/trus 5/22/18  Prostate volume 35.5cc (W 52.2, H 30.2, L 43.1), no median lobe  Cysto: significant lateral lobe obstruction with kissing lobes centrally, no median lobe, short prostatic urethra, grade 1-2 trabeculations  Dual medical therapy flomax+finasteride, with increase of dose of flomax to 0.8mg, and add myrbetriq for urgency. Continue to decrease coffee intake (he is down to 3-4 cups/day)  - return for uroflow/pvr in 3  months, and consider future Urolift vs. TURP     Did not return for UFS.PVR until 7/11/19 and voided only 60cc with PVR 129cc with 2 distinct curves  He returned 7/18/19 for uroflow voiding 196cc (with pvr only 70cc) with Qm 16.2cc/s, Qa 6.8cc/s, 3 intervals - does have good intial bell curve voiding pattern and then 2nd intermittent low peaking curve to empty  He returns today noting:  AUA SS:  16/4 (4:  Weak stream; 3:  Urgency, sleeping; 2:  Frequency, intermittency; 1:  Emptying, straining).  Postvoid residual by bladder scan is 40 cc  He is not taking Myrbetriq due to cost, and is on Ditropan as per Dr. bernard  He has reduced coffee intake some, as he used to drink it up until going to bed, but now has his last cup of coffee between 530 and 6:00 p.m..  He has decreased hot sauce.  His urgency is a little bit better, and he has increased time between urinations  He does have problems with his CPAP and he is awaiting a new mask.  Poor fit.  With mask can get a good 5 hr of sleep.  The is getting injections for macular degeneration.  Has financial stress related to medications medical care.  Not interested in further intervention at this time.     OV 5/28/21:  PVR by bladder scan: 57 mL  Nocturia 2x nightly  Overall doing well with no complaints voiced.  Current  meds:  Flomax 0.4 mg daily and Finasteride 5 mg daily and tolerating with no problems.     09/2022:  UA in office today--Pt is unable to urinate during ov today.  C/o skin condition on scrotal sac that causes him intermittent pain and irritation.  Has seen a Dermatologist in the past concerning this issue but never improved.     Pt denies gross hematuria or dysuria at this time.  AUA SS:  28/3 Mixed  Feeling of ICBE:  3  Frequency:3  Intermittency:5  Urgency:5  Weak urine stream:4  Straining:3  Nocturia:5  PVR by bladder scan:  148 mL     REVIEW OF SYSTEMS:  A comprehensive 10 system review was performed and is negative except as noted above in  HPI    PMHx:  Past Medical History:   Diagnosis Date    CAD (coronary artery disease)     CKD (chronic kidney disease) stage 3, GFR 30-59 ml/min     Diverticulosis     DJD (degenerative joint disease)     HLD (hyperlipidemia)     Hypertension     Macular degeneration     PVD (peripheral vascular disease)      PSHx:  Past Surgical History:   Procedure Laterality Date    ARTERIAL BYPASS SURGRY  2009,2010,2012    Dr Abhishek Lantigua; Peripheral arteries    CAROTID ENDARTERECTOMY Bilateral      5 and 6 years ago.    CHOLECYSTECTOMY      COLON SURGERY  2012    10 inches removed     TONSILLECTOMY      at 17 y/o      Allergies:  Pravastatin    Medications: reviewed   Objective:     Vitals:    03/16/23 0846   BP: 119/63   Pulse: 64     General:WDWN in NAD  Eyes: PERRLA, normal conjunctiva  Respiratory: no increased work on breathing, clear to auscultation  Cardiovascular: regular rate and rhythm. No obvious extremity edema.  GI: palpation of masses. No tenderness. No hepatosplenomegaly to palpation.  Musculoskeletal: normal range of motion of bilateral upper extremities. Normal muscle strength and tone.  Skin: no obvious rashes or lesions. No tightening of skin noted.  Neurologic: CN grossly normal. Normal sensation.   Psychiatric: awake, alert and oriented x 3. Mood and affect normal. Cooperative.      Assessment:       1. BPH with urinary obstruction    2. UTI symptoms    3. Pain in right testicle          Plan:     PSA level to be scheduled today per pt and family's request at this time.  Ultrasound of the Scrotum/Testicles to be performed for further evaluation at this time.    The following instructions were given to the patient to assist with discomfort:  -Use jockstrap or the best supportive underwear he can get for relief of pressure.  -Alternate ice packs/heating pad to areas.  -Take OTC anti-inflammatories  -Sit in a warm tub of water greater than 15 minutes  -Elevate Scrotal Sac     F/u TBD-We will contact pt and  family once we review pending future lab/imaging results for further plan of care.  Pt verbalized understanding.    [facility]: Active    Amie Olvera, CADE-C

## 2023-03-18 ENCOUNTER — TELEPHONE (OUTPATIENT)
Dept: FAMILY MEDICINE | Facility: CLINIC | Age: 83
End: 2023-03-18
Payer: MEDICARE

## 2023-03-18 NOTE — TELEPHONE ENCOUNTER
----- Message from Kristy Leonard sent at 3/17/2023  8:50 AM CDT -----  Contact: pt wife  Type: Needs Medical Advice         Who Called: pt wife -Kathy Ngo Call Back Number:[phone]  Additional Information: Requesting a call back regarding  pt is asking if office can write a letter stating he is 75% disabled for the [facility]. Pt wife is asking for a call back please. She is trying to get help from the [facility] for Home services aids  Please Advise- Thank you

## 2023-03-20 ENCOUNTER — TELEPHONE (OUTPATIENT)
Dept: FAMILY MEDICINE | Facility: CLINIC | Age: 83
End: 2023-03-20
Payer: MEDICARE

## 2023-03-20 RX ORDER — DOXYCYCLINE 100 MG/1
100 CAPSULE ORAL 2 TIMES DAILY
Qty: 28 CAPSULE | Refills: 0 | Status: SHIPPED | OUTPATIENT
Start: 2023-03-20 | End: 2023-04-03

## 2023-03-20 NOTE — TELEPHONE ENCOUNTER
Called was returned. Per Dr Lozano, he does not do disability evaluations. The [facility] would have to place a disability % for Mr Zavaleta. Wife verbalized understanding.

## 2023-05-10 ENCOUNTER — OFFICE VISIT (OUTPATIENT)
Dept: PODIATRY | Facility: CLINIC | Age: 83
End: 2023-05-10
Payer: MEDICARE

## 2023-05-10 VITALS — OXYGEN SATURATION: 99 % | HEART RATE: 82 BPM

## 2023-05-10 DIAGNOSIS — I73.9 ASYMPTOMATIC PVD (PERIPHERAL VASCULAR DISEASE): Primary | ICD-10-CM

## 2023-05-10 DIAGNOSIS — L60.9 DISEASE OF NAIL: ICD-10-CM

## 2023-05-10 PROCEDURE — 1126F PR PAIN SEVERITY QUANTIFIED, NO PAIN PRESENT: ICD-10-PCS | Mod: CPTII,,, | Performed by: PODIATRIST

## 2023-05-10 PROCEDURE — 3288F PR FALLS RISK ASSESSMENT DOCUMENTED: ICD-10-PCS | Mod: CPTII,,, | Performed by: PODIATRIST

## 2023-05-10 PROCEDURE — 1100F PTFALLS ASSESS-DOCD GE2>/YR: CPT | Mod: CPTII,,, | Performed by: PODIATRIST

## 2023-05-10 PROCEDURE — 1159F MED LIST DOCD IN RCRD: CPT | Mod: CPTII,,, | Performed by: PODIATRIST

## 2023-05-10 PROCEDURE — 3288F FALL RISK ASSESSMENT DOCD: CPT | Mod: CPTII,,, | Performed by: PODIATRIST

## 2023-05-10 PROCEDURE — 1160F RVW MEDS BY RX/DR IN RCRD: CPT | Mod: CPTII,,, | Performed by: PODIATRIST

## 2023-05-10 PROCEDURE — 99203 PR OFFICE/OUTPT VISIT, NEW, LEVL III, 30-44 MIN: ICD-10-PCS | Mod: 25,,, | Performed by: PODIATRIST

## 2023-05-10 PROCEDURE — 1100F PR PT FALLS ASSESS DOC 2+ FALLS/FALL W/INJURY/YR: ICD-10-PCS | Mod: CPTII,,, | Performed by: PODIATRIST

## 2023-05-10 PROCEDURE — 99203 OFFICE O/P NEW LOW 30 MIN: CPT | Mod: 25,,, | Performed by: PODIATRIST

## 2023-05-10 PROCEDURE — 1159F PR MEDICATION LIST DOCUMENTED IN MEDICAL RECORD: ICD-10-PCS | Mod: CPTII,,, | Performed by: PODIATRIST

## 2023-05-10 PROCEDURE — 11721 ROUTINE FOOT CARE: ICD-10-PCS | Mod: Q9,,, | Performed by: PODIATRIST

## 2023-05-10 PROCEDURE — 1126F AMNT PAIN NOTED NONE PRSNT: CPT | Mod: CPTII,,, | Performed by: PODIATRIST

## 2023-05-10 PROCEDURE — 11721 DEBRIDE NAIL 6 OR MORE: CPT | Mod: Q9,,, | Performed by: PODIATRIST

## 2023-05-10 PROCEDURE — 1160F PR REVIEW ALL MEDS BY PRESCRIBER/CLIN PHARMACIST DOCUMENTED: ICD-10-PCS | Mod: CPTII,,, | Performed by: PODIATRIST

## 2023-05-10 RX ORDER — CLOPIDOGREL BISULFATE 75 MG/1
75 TABLET ORAL DAILY
COMMUNITY
Start: 2023-02-23

## 2023-05-10 NOTE — PROGRESS NOTES
Subjective:     Patient ID: Giovanni Zavaleta Sr. is a 82 y.o. male.    Chief Complaint: Nail Care    Giovanni is a 82 y.o. male who presents to the clinic for evaluation and treatment of high risk feet. Giovanni has a past medical history of CAD (coronary artery disease), CKD (chronic kidney disease) stage 3, GFR 30-59 ml/min, Diverticulosis, DJD (degenerative joint disease), HLD (hyperlipidemia), Hypertension, Macular degeneration, and PVD (peripheral vascular disease). The patient's chief complaint is long, thick toenails. This patient has documented high risk feet requiring routine maintenance secondary to peripheral vascular disease.    PCP: Melchor Collier MD    Date Last Seen by PCP: 03/09/2023    Current shoe gear:  Affected Foot: Tennis shoes     Unaffected Foot: Tennis shoes    Last encounter in this department: Visit date not found    Hemoglobin A1C   Date Value Ref Range Status   03/09/2023 5.7 (H) 4.0 - 5.6 % Final     Comment:     ADA Screening Guidelines:  5.7-6.4%  Consistent with prediabetes  >or=6.5%  Consistent with diabetes    High levels of fetal hemoglobin interfere with the HbA1C  assay. Heterozygous hemoglobin variants (HbS, HgC, etc)do  not significantly interfere with this assay.   However, presence of multiple variants may affect accuracy.     12/07/2021 5.6 4.0 - 5.6 % Final     Comment:     ADA Screening Guidelines:  5.7-6.4%  Consistent with prediabetes  >or=6.5%  Consistent with diabetes    High levels of fetal hemoglobin interfere with the HbA1C  assay. Heterozygous hemoglobin variants (HbS, HgC, etc)do  not significantly interfere with this assay.   However, presence of multiple variants may affect accuracy.         Review of Systems   Constitutional: Negative for chills and fever.   Cardiovascular:  Positive for leg swelling. Negative for chest pain.   Respiratory:  Negative for cough and shortness of breath.    Gastrointestinal:  Negative for diarrhea, nausea and vomiting.    Neurological:  Positive for paresthesias.      Objective:     Physical Exam  Vitals reviewed.   Constitutional:       General: He is not in acute distress.     Appearance: Normal appearance. He is not ill-appearing.   HENT:      Head: Normocephalic.      Nose: Nose normal.   Cardiovascular:      Rate and Rhythm: Normal rate.   Pulmonary:      Effort: Pulmonary effort is normal. No respiratory distress.   Skin:     Capillary Refill: Capillary refill takes 2 to 3 seconds.   Neurological:      Mental Status: He is alert and oriented to person, place, and time.   Psychiatric:         Mood and Affect: Mood normal.         Behavior: Behavior normal.         Thought Content: Thought content normal.     Neurologic:  Protective and light touch sensation intact bilateral lower extremity, positive paresthesias occasional reported   Vascular:  DP and PT pulses palpable 1/4 bilateral foot, capillary fill time less than 3 seconds to distal aspect of the digits bilateral foot, pedal hair growth is decreased to the distal digits bilateral foot, skin temperature gradient warm to cool from proximal to distal bilateral foot, mild rubor noted on the dorsum of the bilateral forefoot, +1 nonpitting edema noted to the bilateral foot and ankle   Dermatologic:  Nails 1 through 5 bilateral are discolored, thickened, and elongated, no open lesions noted bilateral foot, no rashes noted bilateral foot, no interdigital maceration noted bilateral foot   Musculoskeletal:  5/5 muscle strength noted bilateral foot, ankle joint range of motion is reduced without pain, no masses noted bilateral foot    Assessment:      Encounter Diagnoses   Name Primary?    Asymptomatic PVD (peripheral vascular disease) Yes    Disease of nail      Plan:     Giovanni was seen today for nail care.    Diagnoses and all orders for this visit:    Asymptomatic PVD (peripheral vascular disease)  -     Routine Foot Care    Disease of nail  -     Routine Foot Care      I  counseled the patient on his conditions, their implications and medical management.        1. Patient was examined and evaluated.    2. Discussed patient etiology of fungal nail changes.  Discussed OTC topical conservative treatments such as Vicks vapor rub tea tree oil with the patient.  Patient was encouraged to continue with previously prescribed topical Penlac 8% solution.    Routine Foot Care    Date/Time: 5/10/2023 12:30 PM  Performed by: Magda Turner DPM  Authorized by: Magda Turner DPM     Consent Done?:  Yes (Verbal)    Nail Care Type:  Debride  Location(s): All  (Left 1st Toe, Left 3rd Toe, Left 2nd Toe, Left 4th Toe, Left 5th Toe, Right 1st Toe, Right 2nd Toe, Right 3rd Toe, Right 4th Toe and Right 5th Toe)  Patient tolerance:  Patient tolerated the procedure well with no immediate complications    3. Discussed with patient etiology of age-related vascular disease.  Patient was advised to elevate lower extremity when at rest.  Patient was advised to monitor his water intake and salt intake.  Patient was advised to consider daily use of compression stockings  4. Patient was advised to continue with comfortable shoe gear both inside and outside the home.  Patient will decrease the amount of barefoot walking and the crease and limit the use of slides and flip-flops.  Patient will continue with a rolled walker for added stability while ambulating.  5. Patient will follow-up in 3 months or p.r.n. for foot complaints

## 2023-05-30 DIAGNOSIS — I10 HYPERTENSION, UNSPECIFIED TYPE: ICD-10-CM

## 2023-05-31 RX ORDER — LABETALOL 200 MG/1
TABLET, FILM COATED ORAL
Qty: 90 TABLET | Refills: 1 | Status: SHIPPED | OUTPATIENT
Start: 2023-05-31

## 2023-07-11 ENCOUNTER — TELEPHONE (OUTPATIENT)
Dept: FAMILY MEDICINE | Facility: CLINIC | Age: 83
End: 2023-07-11
Payer: MEDICARE

## 2023-07-11 NOTE — TELEPHONE ENCOUNTER
----- Message from Yumiko Pelletier sent at 7/11/2023  8:19 AM CDT -----  Contact: BELLA MONTANA    Type: Needs Medical Advice      Who Called:  BELLA MONTANA    Best Call Back Number: [phone]     Additional Information: Patient is calling to speak with nurse/MA regarding appt. Spouse has a NP appt scheduled on 08-28-23 @10am, requesting to seen on same day as spouse.   Please call back and advise. Thanks

## 2023-07-11 NOTE — TELEPHONE ENCOUNTER
Appointment request made for the same day as spouse unable to accommodate but did schedule an appointment pt agreed on.

## 2023-07-13 ENCOUNTER — TELEPHONE (OUTPATIENT)
Dept: FAMILY MEDICINE | Facility: CLINIC | Age: 83
End: 2023-07-13
Payer: MEDICARE

## 2023-07-13 DIAGNOSIS — N18.30 STAGE 3 CHRONIC KIDNEY DISEASE, UNSPECIFIED WHETHER STAGE 3A OR 3B CKD: ICD-10-CM

## 2023-07-13 DIAGNOSIS — E87.0 HYPERNATREMIA: ICD-10-CM

## 2023-07-13 DIAGNOSIS — R73.02 IGT (IMPAIRED GLUCOSE TOLERANCE): Primary | ICD-10-CM

## 2023-07-13 DIAGNOSIS — E78.00 HYPERCHOLESTEROLEMIA: ICD-10-CM

## 2023-07-13 DIAGNOSIS — Z79.899 ENCOUNTER FOR LONG-TERM (CURRENT) USE OF OTHER MEDICATIONS: ICD-10-CM

## 2023-07-19 ENCOUNTER — TELEPHONE (OUTPATIENT)
Dept: FAMILY MEDICINE | Facility: CLINIC | Age: 83
End: 2023-07-19
Payer: MEDICARE

## 2023-07-19 NOTE — TELEPHONE ENCOUNTER
----- Message from Yenny Villarreal sent at 7/19/2023  8:13 AM CDT -----  Regarding: Sooner Appointment Request  Contact: patient's wife at [phone]  Type:  Sooner Appointment Request    Name of Caller:  patient's wife at [phone]    When is the first available appointment?  December    Additional Information:  Patient missed last appointment and would like to have it rescheduled for 8/28 after his wife's appointment at 10 am. Please call and advise. Thank you

## 2023-07-25 ENCOUNTER — LAB VISIT (OUTPATIENT)
Dept: LAB | Facility: CLINIC | Age: 83
End: 2023-07-25
Payer: MEDICARE

## 2023-07-25 DIAGNOSIS — E78.00 HYPERCHOLESTEROLEMIA: ICD-10-CM

## 2023-07-25 DIAGNOSIS — N18.30 STAGE 3 CHRONIC KIDNEY DISEASE, UNSPECIFIED WHETHER STAGE 3A OR 3B CKD: ICD-10-CM

## 2023-07-25 DIAGNOSIS — E87.0 HYPERNATREMIA: ICD-10-CM

## 2023-07-25 DIAGNOSIS — R73.02 IGT (IMPAIRED GLUCOSE TOLERANCE): ICD-10-CM

## 2023-07-25 DIAGNOSIS — Z79.899 ENCOUNTER FOR LONG-TERM (CURRENT) USE OF OTHER MEDICATIONS: ICD-10-CM

## 2023-07-25 LAB
ALBUMIN SERPL BCP-MCNC: 3.9 G/DL (ref 3.5–5.2)
ALP SERPL-CCNC: 62 U/L (ref 55–135)
ALT SERPL W/O P-5'-P-CCNC: 11 U/L (ref 10–44)
ANION GAP SERPL CALC-SCNC: 11 MMOL/L (ref 8–16)
AST SERPL-CCNC: 12 U/L (ref 10–40)
BASOPHILS # BLD AUTO: 0.04 K/UL (ref 0–0.2)
BASOPHILS NFR BLD: 0.5 % (ref 0–1.9)
BILIRUB SERPL-MCNC: 0.5 MG/DL (ref 0.1–1)
BUN SERPL-MCNC: 17 MG/DL (ref 8–23)
CALCIUM SERPL-MCNC: 9.9 MG/DL (ref 8.7–10.5)
CHLORIDE SERPL-SCNC: 110 MMOL/L (ref 95–110)
CHOLEST SERPL-MCNC: 136 MG/DL (ref 120–199)
CHOLEST/HDLC SERPL: 3.1 {RATIO} (ref 2–5)
CO2 SERPL-SCNC: 26 MMOL/L (ref 23–29)
CREAT SERPL-MCNC: 1 MG/DL (ref 0.5–1.4)
DIFFERENTIAL METHOD: ABNORMAL
EOSINOPHIL # BLD AUTO: 0.4 K/UL (ref 0–0.5)
EOSINOPHIL NFR BLD: 4.9 % (ref 0–8)
ERYTHROCYTE [DISTWIDTH] IN BLOOD BY AUTOMATED COUNT: 13.4 % (ref 11.5–14.5)
EST. GFR  (NO RACE VARIABLE): >60 ML/MIN/1.73 M^2
ESTIMATED AVG GLUCOSE: 114 MG/DL (ref 68–131)
GLUCOSE SERPL-MCNC: 94 MG/DL (ref 70–110)
HBA1C MFR BLD: 5.6 % (ref 4–5.6)
HCT VFR BLD AUTO: 46.1 % (ref 40–54)
HDLC SERPL-MCNC: 44 MG/DL (ref 40–75)
HDLC SERPL: 32.4 % (ref 20–50)
HGB BLD-MCNC: 14.2 G/DL (ref 14–18)
IMM GRANULOCYTES # BLD AUTO: 0.04 K/UL (ref 0–0.04)
IMM GRANULOCYTES NFR BLD AUTO: 0.5 % (ref 0–0.5)
LDLC SERPL CALC-MCNC: 76.2 MG/DL (ref 63–159)
LYMPHOCYTES # BLD AUTO: 1.9 K/UL (ref 1–4.8)
LYMPHOCYTES NFR BLD: 23.9 % (ref 18–48)
MCH RBC QN AUTO: 26.3 PG (ref 27–31)
MCHC RBC AUTO-ENTMCNC: 30.8 G/DL (ref 32–36)
MCV RBC AUTO: 85 FL (ref 82–98)
MONOCYTES # BLD AUTO: 0.8 K/UL (ref 0.3–1)
MONOCYTES NFR BLD: 9.9 % (ref 4–15)
NEUTROPHILS # BLD AUTO: 4.8 K/UL (ref 1.8–7.7)
NEUTROPHILS NFR BLD: 60.3 % (ref 38–73)
NONHDLC SERPL-MCNC: 92 MG/DL
NRBC BLD-RTO: 0 /100 WBC
PLATELET # BLD AUTO: 218 K/UL (ref 150–450)
PMV BLD AUTO: 9.7 FL (ref 9.2–12.9)
POTASSIUM SERPL-SCNC: 4.4 MMOL/L (ref 3.5–5.1)
PROT SERPL-MCNC: 6.8 G/DL (ref 6–8.4)
RBC # BLD AUTO: 5.4 M/UL (ref 4.6–6.2)
SODIUM SERPL-SCNC: 147 MMOL/L (ref 136–145)
TRIGL SERPL-MCNC: 79 MG/DL (ref 30–150)
TSH SERPL DL<=0.005 MIU/L-ACNC: 3.09 UIU/ML (ref 0.4–4)
WBC # BLD AUTO: 8 K/UL (ref 3.9–12.7)

## 2023-07-25 PROCEDURE — 85025 COMPLETE CBC W/AUTO DIFF WBC: CPT | Performed by: INTERNAL MEDICINE

## 2023-07-25 PROCEDURE — 80053 COMPREHEN METABOLIC PANEL: CPT | Performed by: INTERNAL MEDICINE

## 2023-07-25 PROCEDURE — 83036 HEMOGLOBIN GLYCOSYLATED A1C: CPT | Performed by: INTERNAL MEDICINE

## 2023-07-25 PROCEDURE — 84443 ASSAY THYROID STIM HORMONE: CPT | Performed by: INTERNAL MEDICINE

## 2023-07-25 PROCEDURE — 80061 LIPID PANEL: CPT | Performed by: INTERNAL MEDICINE

## 2023-08-04 DIAGNOSIS — E78.5 HYPERLIPIDEMIA, UNSPECIFIED HYPERLIPIDEMIA TYPE: ICD-10-CM

## 2023-08-04 RX ORDER — FENOFIBRATE 160 MG/1
160 TABLET ORAL DAILY
Qty: 90 TABLET | Refills: 1 | OUTPATIENT
Start: 2023-08-04

## 2023-08-23 ENCOUNTER — TELEPHONE (OUTPATIENT)
Dept: FAMILY MEDICINE | Facility: CLINIC | Age: 83
End: 2023-08-23
Payer: MEDICARE

## 2023-08-25 ENCOUNTER — TELEPHONE (OUTPATIENT)
Dept: FAMILY MEDICINE | Facility: CLINIC | Age: 83
End: 2023-08-25
Payer: MEDICARE

## 2023-08-25 NOTE — TELEPHONE ENCOUNTER
----- Message from Cheryl Pena, Patient Care Assistant sent at 8/25/2023  8:42 AM CDT -----  Regarding: refill  Contact: Sarika with tyrel  Type:  RX Refill Request    Who Called:  Sarika with tyrel  Refill or New Rx:  refill   RX Name and Strength:  fenofibrate 160 MG Tab    How is the patient currently taking it? 1xday  Is this a 30 day or 90 day RX:  30     Preferred Pharmacy with phone number:    [facility] - [place], [place] - [address]  [address] [address]  Phone: [phone] Fax: [phone]    Local or Mail Order:  local   Ordering Provider:  Anup Ngo Call Back Number:  [phone] (home)     Additional Information:  please call Sarika with tyrel to advise. Thanks!

## 2023-08-25 NOTE — TELEPHONE ENCOUNTER
Dr. Hebert has not prescribed this medication for this patient, and has only seen the patient once for an urgent care type/same day visit back in February of this year. Last time this was prescribed was in August of 2022 when it was given for a 6 month supply, which would indicate that the patient has been without this medication for approximately 6 months if taking as prescribed.       Called and spoke with pharmacy tech - relayed to them that patient will need to contact our office for an appointment to follow up and discuss with MD.

## 2023-08-28 ENCOUNTER — OFFICE VISIT (OUTPATIENT)
Dept: FAMILY MEDICINE | Facility: CLINIC | Age: 83
End: 2023-08-28
Payer: MEDICARE

## 2023-08-28 VITALS
HEART RATE: 68 BPM | BODY MASS INDEX: 27.38 KG/M2 | TEMPERATURE: 98 F | WEIGHT: 174.81 LBS | DIASTOLIC BLOOD PRESSURE: 78 MMHG | SYSTOLIC BLOOD PRESSURE: 118 MMHG | RESPIRATION RATE: 12 BRPM | OXYGEN SATURATION: 95 %

## 2023-08-28 DIAGNOSIS — L25.8 INCONTINENCE ASSOCIATED DERMATITIS: ICD-10-CM

## 2023-08-28 DIAGNOSIS — Z12.5 PROSTATE CANCER SCREENING: ICD-10-CM

## 2023-08-28 DIAGNOSIS — R32 INCONTINENCE ASSOCIATED DERMATITIS: ICD-10-CM

## 2023-08-28 DIAGNOSIS — E87.0 HYPERNATREMIA: ICD-10-CM

## 2023-08-28 DIAGNOSIS — E03.9 HYPOTHYROIDISM, UNSPECIFIED TYPE: ICD-10-CM

## 2023-08-28 DIAGNOSIS — E78.00 HYPERCHOLESTEROLEMIA: ICD-10-CM

## 2023-08-28 DIAGNOSIS — F03.90 DEMENTIA, UNSPECIFIED DEMENTIA SEVERITY, UNSPECIFIED DEMENTIA TYPE, UNSPECIFIED WHETHER BEHAVIORAL, PSYCHOTIC, OR MOOD DISTURBANCE OR ANXIETY: ICD-10-CM

## 2023-08-28 DIAGNOSIS — E78.5 HYPERLIPIDEMIA, UNSPECIFIED HYPERLIPIDEMIA TYPE: ICD-10-CM

## 2023-08-28 DIAGNOSIS — R79.9 ABNORMAL FINDING OF BLOOD CHEMISTRY, UNSPECIFIED: ICD-10-CM

## 2023-08-28 DIAGNOSIS — N18.30 STAGE 3 CHRONIC KIDNEY DISEASE, UNSPECIFIED WHETHER STAGE 3A OR 3B CKD: Primary | ICD-10-CM

## 2023-08-28 PROCEDURE — 99214 OFFICE O/P EST MOD 30 MIN: CPT | Mod: ,,, | Performed by: FAMILY MEDICINE

## 2023-08-28 PROCEDURE — 3078F PR MOST RECENT DIASTOLIC BLOOD PRESSURE < 80 MM HG: ICD-10-PCS | Mod: CPTII,,, | Performed by: FAMILY MEDICINE

## 2023-08-28 PROCEDURE — 3074F SYST BP LT 130 MM HG: CPT | Mod: CPTII,,, | Performed by: FAMILY MEDICINE

## 2023-08-28 PROCEDURE — 3078F DIAST BP <80 MM HG: CPT | Mod: CPTII,,, | Performed by: FAMILY MEDICINE

## 2023-08-28 PROCEDURE — 1159F MED LIST DOCD IN RCRD: CPT | Mod: CPTII,,, | Performed by: FAMILY MEDICINE

## 2023-08-28 PROCEDURE — 1126F PR PAIN SEVERITY QUANTIFIED, NO PAIN PRESENT: ICD-10-PCS | Mod: CPTII,,, | Performed by: FAMILY MEDICINE

## 2023-08-28 PROCEDURE — 3074F PR MOST RECENT SYSTOLIC BLOOD PRESSURE < 130 MM HG: ICD-10-PCS | Mod: CPTII,,, | Performed by: FAMILY MEDICINE

## 2023-08-28 PROCEDURE — 3288F FALL RISK ASSESSMENT DOCD: CPT | Mod: CPTII,,, | Performed by: FAMILY MEDICINE

## 2023-08-28 PROCEDURE — 99214 PR OFFICE/OUTPT VISIT, EST, LEVL IV, 30-39 MIN: ICD-10-PCS | Mod: ,,, | Performed by: FAMILY MEDICINE

## 2023-08-28 PROCEDURE — 1126F AMNT PAIN NOTED NONE PRSNT: CPT | Mod: CPTII,,, | Performed by: FAMILY MEDICINE

## 2023-08-28 PROCEDURE — 1159F PR MEDICATION LIST DOCUMENTED IN MEDICAL RECORD: ICD-10-PCS | Mod: CPTII,,, | Performed by: FAMILY MEDICINE

## 2023-08-28 PROCEDURE — 3288F PR FALLS RISK ASSESSMENT DOCUMENTED: ICD-10-PCS | Mod: CPTII,,, | Performed by: FAMILY MEDICINE

## 2023-08-28 PROCEDURE — 1100F PTFALLS ASSESS-DOCD GE2>/YR: CPT | Mod: CPTII,,, | Performed by: FAMILY MEDICINE

## 2023-08-28 PROCEDURE — 1100F PR PT FALLS ASSESS DOC 2+ FALLS/FALL W/INJURY/YR: ICD-10-PCS | Mod: CPTII,,, | Performed by: FAMILY MEDICINE

## 2023-08-28 RX ORDER — FENOFIBRATE 134 MG/1
134 CAPSULE ORAL
Qty: 90 CAPSULE | Refills: 3 | Status: SHIPPED | OUTPATIENT
Start: 2023-08-28 | End: 2024-08-27

## 2023-08-28 NOTE — PROGRESS NOTES
Subjective:       Patient ID: Giovanni Zavaleta Sr. is a 82 y.o. male.    Chief Complaint: Follow-up (labs)    Mr. Zavaleta is an 82-year-old male who is here to establish care.  He is a former patient of Dr. Lozano and has a history of age-related macular degeneration, peripheral vascular disease, stage 3 chronic kidney disease, BPH, and an aortic abdominal aneurysm.  He does have mild-to-moderate dementia per his wife.  He does need a renewal of his disabled parking placard.  See scanned in form  He fell last week and suffered an abrasion to his left posterior occiput. He went to ER and was cleared with CT scan and had the abrasion glued and it is healing well. He has seen a neurologist for his dementia.      Review of Systems   Constitutional:  Negative for activity change, appetite change, chills, diaphoresis and fever.   HENT:  Negative for congestion, ear pain, postnasal drip, rhinorrhea and sore throat.    Eyes:  Negative for pain, discharge, redness and itching.   Respiratory:  Negative for cough and shortness of breath.    Cardiovascular:  Negative for chest pain, palpitations and leg swelling.   Gastrointestinal:  Negative for abdominal distention, abdominal pain, constipation, diarrhea and nausea.   Genitourinary:  Negative for difficulty urinating, dysuria, frequency and urgency.   Skin:  Negative for color change, rash and wound.   All other systems reviewed and are negative.      Patient Active Problem List   Diagnosis    Exudative age-related macular degeneration of left eye with active choroidal neovascularization    Nonexudative age-related macular degeneration, right eye, intermediate dry stage    NS (nuclear sclerosis)    BPH with urinary obstruction    Stage 3 chronic kidney disease, unspecified whether stage 3a or 3b CKD    Peripheral vascular disease    History of AAA (abdominal aortic aneurysm) repair       Objective:      Physical Exam  Vitals and nursing note reviewed.   Constitutional:        Appearance: Normal appearance. He is normal weight.   HENT:      Head: Normocephalic.      Nose: Nose normal.   Eyes:      Extraocular Movements: Extraocular movements intact.      Conjunctiva/sclera: Conjunctivae normal.      Pupils: Pupils are equal, round, and reactive to light.   Cardiovascular:      Rate and Rhythm: Normal rate and regular rhythm.      Heart sounds: Normal heart sounds. No murmur heard.  Pulmonary:      Effort: Pulmonary effort is normal. No respiratory distress.      Breath sounds: Normal breath sounds.   Musculoskeletal:         General: Normal range of motion.      Cervical back: Normal range of motion and neck supple.   Skin:     General: Skin is warm and dry.      Comments: Healing abrasion to posterior left occiput   Neurological:      General: No focal deficit present.      Mental Status: He is alert and oriented to person, place, and time.   Psychiatric:         Mood and Affect: Mood normal.         Behavior: Behavior normal.         Lab Results   Component Value Date    WBC 8.00 07/25/2023    HGB 14.2 07/25/2023    HCT 46.1 07/25/2023     07/25/2023    CHOL 136 07/25/2023    TRIG 79 07/25/2023    HDL 44 07/25/2023    ALT 11 07/25/2023    AST 12 07/25/2023     (H) 07/25/2023    K 4.4 07/25/2023     07/25/2023    CREATININE 1.0 07/25/2023    BUN 17 07/25/2023    CO2 26 07/25/2023    TSH 3.094 07/25/2023    HGBA1C 5.6 07/25/2023     The ASCVD Risk score (Leslie DK, et al., 2019) failed to calculate for the following reasons:    The 2019 ASCVD risk score is only valid for ages 40 to 79  Visit Vitals  /78 (BP Location: Left arm, Patient Position: Sitting, BP Method: Medium (Manual))   Pulse 68   Temp 97.7 °F (36.5 °C) (Oral)   Resp 12   Wt 79.3 kg (174 lb 13.2 oz)   SpO2 95%   BMI 27.38 kg/m²      Assessment:       1. Stage 3 chronic kidney disease, unspecified whether stage 3a or 3b CKD    2. Hypercholesterolemia    3. Hypernatremia    4. Hypothyroidism,  unspecified type    5. Prostate cancer screening    6. Abnormal finding of blood chemistry, unspecified    7. Hyperlipidemia, unspecified hyperlipidemia type    8. Dementia, unspecified dementia severity, unspecified dementia type, unspecified whether behavioral, psychotic, or mood disturbance or anxiety    9. Incontinence associated dermatitis        Plan:       1. Stage 3 chronic kidney disease, unspecified whether stage 3a or 3b CKD  -     Cancel: CBC Auto Differential; Future; Expected date: 08/28/2023  -     Cancel: Comprehensive Metabolic Panel; Future; Expected date: 08/28/2023  -     Cancel: Hemoglobin A1C; Future; Expected date: 08/28/2023  -     Cancel: Microalbumin/Creatinine Ratio, Urine; Future; Expected date: 08/28/2023  -     CBC Auto Differential; Future; Expected date: 02/28/2024  -     Comprehensive Metabolic Panel; Future; Expected date: 02/28/2024  -     Hemoglobin A1C; Future; Expected date: 02/28/2024  -     Microalbumin/Creatinine Ratio, Urine; Future; Expected date: 02/28/2024    2. Hypercholesterolemia  -     Cancel: Lipid Panel; Future; Expected date: 08/28/2023  -     Lipid Panel; Future; Expected date: 02/28/2024    3. Hypernatremia    4. Hypothyroidism, unspecified type  -     Cancel: TSH; Future; Expected date: 08/28/2023  -     TSH; Future; Expected date: 02/28/2024    5. Prostate cancer screening  -     Cancel: PSA, Screening; Future; Expected date: 08/28/2023    6. Abnormal finding of blood chemistry, unspecified  -     Cancel: Hemoglobin A1C; Future; Expected date: 08/28/2023  -     Hemoglobin A1C; Future; Expected date: 02/28/2024    7. Hyperlipidemia, unspecified hyperlipidemia type  -     fenofibrate micronized (LOFIBRA) 134 MG Cap; Take 1 capsule (134 mg total) by mouth daily with breakfast.  Dispense: 90 capsule; Refill: 3    8. Dementia, unspecified dementia severity, unspecified dementia type, unspecified whether behavioral, psychotic, or mood disturbance or anxiety  -      Ambulatory referral/consult to Home Health; Future; Expected date: 08/29/2023    9. Incontinence associated dermatitis  -     Ambulatory referral/consult to Home Health; Future; Expected date: 08/29/2023       Follow up in about 6 months (around 2/28/2024), or if symptoms worsen or fail to improve.      Future Appointments       Date Provider Specialty Appt Notes    9/8/2023 Magda Turner, DPM Podiatry nail clipping

## 2023-08-30 ENCOUNTER — TELEPHONE (OUTPATIENT)
Dept: FAMILY MEDICINE | Facility: CLINIC | Age: 83
End: 2023-08-30
Payer: MEDICARE

## 2023-08-31 ENCOUNTER — TELEPHONE (OUTPATIENT)
Dept: FAMILY MEDICINE | Facility: CLINIC | Age: 83
End: 2023-08-31
Payer: MEDICARE

## 2023-08-31 NOTE — TELEPHONE ENCOUNTER
F[facility], gave to provider to review and sign prior to submitting via fax to [facility].

## 2023-08-31 NOTE — TELEPHONE ENCOUNTER
----- Message from Roel Rowe sent at 8/31/2023 11:03 AM CDT -----  Type: Needs Medical Advice  Who Called: pt's wife  Best Call Back Number: [phone]  Additional Information: pt's wife is calling the office to have the Dr fax the orders and that it is necessary for the pt to [facility]. Please contact and fax this paperwork to Dr. Kajal Grant  attn to Pact #2, the fax to [phone]. Please call back to advise Thanks!

## 2023-08-31 NOTE — TELEPHONE ENCOUNTER
[facility] signed document to [facility] as requested. Called and spoke with spouse - relayed this information.

## 2023-09-08 ENCOUNTER — OFFICE VISIT (OUTPATIENT)
Dept: PODIATRY | Facility: CLINIC | Age: 83
End: 2023-09-08
Payer: MEDICARE

## 2023-09-08 VITALS — WEIGHT: 174 LBS | HEIGHT: 67 IN | BODY MASS INDEX: 27.31 KG/M2

## 2023-09-08 DIAGNOSIS — L60.9 DISEASE OF NAIL: ICD-10-CM

## 2023-09-08 DIAGNOSIS — I73.9 ASYMPTOMATIC PVD (PERIPHERAL VASCULAR DISEASE): Primary | ICD-10-CM

## 2023-09-08 PROCEDURE — 99499 NO LOS: ICD-10-PCS | Mod: ,,, | Performed by: PODIATRIST

## 2023-09-08 PROCEDURE — 11721 DEBRIDE NAIL 6 OR MORE: CPT | Mod: Q9,,, | Performed by: PODIATRIST

## 2023-09-08 PROCEDURE — 99499 UNLISTED E&M SERVICE: CPT | Mod: ,,, | Performed by: PODIATRIST

## 2023-09-08 PROCEDURE — 11721 ROUTINE FOOT CARE: ICD-10-PCS | Mod: Q9,,, | Performed by: PODIATRIST

## 2023-09-08 NOTE — PROGRESS NOTES
Subjective:     Patient ID: Giovanni Zavaleta Sr. is a 82 y.o. male.    Chief Complaint: Nail Care    Giovnani is a 82 y.o. male who presents to the clinic for evaluation and treatment of high risk feet. Giovanni has a past medical history of CAD (coronary artery disease), CKD (chronic kidney disease) stage 3, GFR 30-59 ml/min, Diverticulosis, DJD (degenerative joint disease), HLD (hyperlipidemia), Hypertension, Macular degeneration, and PVD (peripheral vascular disease). The patient's chief complaint is long, thick toenails. This patient has documented high risk feet requiring routine maintenance secondary to peripheral vascular disease.    PCP: Leanna Hebert MD    Date Last Seen by PCP: 08/23/2023    Current shoe gear:  Affected Foot: Casual shoes     Unaffected Foot: Casual shoes    Last encounter in this department: 5/10/2023    Hemoglobin A1C   Date Value Ref Range Status   07/25/2023 5.6 4.0 - 5.6 % Final     Comment:     ADA Screening Guidelines:  5.7-6.4%  Consistent with prediabetes  >or=6.5%  Consistent with diabetes    High levels of fetal hemoglobin interfere with the HbA1C  assay. Heterozygous hemoglobin variants (HbS, HgC, etc)do  not significantly interfere with this assay.   However, presence of multiple variants may affect accuracy.     03/09/2023 5.7 (H) 4.0 - 5.6 % Final     Comment:     ADA Screening Guidelines:  5.7-6.4%  Consistent with prediabetes  >or=6.5%  Consistent with diabetes    High levels of fetal hemoglobin interfere with the HbA1C  assay. Heterozygous hemoglobin variants (HbS, HgC, etc)do  not significantly interfere with this assay.   However, presence of multiple variants may affect accuracy.     12/07/2021 5.6 4.0 - 5.6 % Final     Comment:     ADA Screening Guidelines:  5.7-6.4%  Consistent with prediabetes  >or=6.5%  Consistent with diabetes    High levels of fetal hemoglobin interfere with the HbA1C  assay. Heterozygous hemoglobin variants (HbS, HgC, etc)do  not significantly  interfere with this assay.   However, presence of multiple variants may affect accuracy.         Review of Systems   Constitutional: Negative for chills and fever.   Cardiovascular:  Positive for leg swelling. Negative for chest pain.   Respiratory:  Negative for cough and shortness of breath.    Gastrointestinal:  Negative for diarrhea, nausea and vomiting.   Neurological:  Positive for paresthesias.        Objective:     Physical Exam  Vitals reviewed.   Constitutional:       General: He is not in acute distress.     Appearance: Normal appearance. He is not ill-appearing.   HENT:      Head: Normocephalic.      Nose: Nose normal.   Cardiovascular:      Rate and Rhythm: Normal rate.   Pulmonary:      Effort: Pulmonary effort is normal. No respiratory distress.   Skin:     Capillary Refill: Capillary refill takes 2 to 3 seconds.   Neurological:      Mental Status: He is alert and oriented to person, place, and time.   Psychiatric:         Mood and Affect: Mood normal.         Behavior: Behavior normal.         Thought Content: Thought content normal.       Neurologic:  Protective and light touch sensation intact bilateral lower extremity, positive paresthesias occasional reported   Vascular:  DP and PT pulses palpable 1/4 bilateral foot, capillary fill time less than 3 seconds to distal aspect of the digits bilateral foot, pedal hair growth is decreased to the distal digits bilateral foot, skin temperature gradient warm to cool from proximal to distal bilateral foot, mild rubor noted on the dorsum of the bilateral forefoot, +1 nonpitting edema noted to the bilateral foot and ankle   Dermatologic:  Nails 1 through 5 bilateral are discolored, thickened, and elongated, no open lesions noted bilateral foot, no rashes noted bilateral foot, no interdigital maceration noted bilateral foot   Musculoskeletal:  5/5 muscle strength noted bilateral foot, ankle joint range of motion is reduced without pain, no masses noted  bilateral foot    Assessment:      Encounter Diagnoses   Name Primary?    Asymptomatic PVD (peripheral vascular disease) Yes    Disease of nail      Plan:     Giovanni was seen today for nail care.    Diagnoses and all orders for this visit:    Asymptomatic PVD (peripheral vascular disease)  -     Routine Foot Care    Disease of nail  -     Routine Foot Care      I counseled the patient on his conditions, their implications and medical management.        1. Patient was examined and evaluated.    2. Patient was encouraged to continue with OTC topical conservative treatments for fungal nail changes.  Patient was advised to attempt daily application of Vicks vapor rub or tea tree oil.    Routine Foot Care    Date/Time: 9/8/2023 1:15 PM    Performed by: Magda Turner DPM  Authorized by: Magda Turner DPM    Consent Done?:  Yes (Verbal)    Nail Care Type:  Debride  Location(s): All  (Left 1st Toe, Left 3rd Toe, Left 2nd Toe, Left 4th Toe, Left 5th Toe, Right 1st Toe, Right 2nd Toe, Right 3rd Toe, Right 4th Toe and Right 5th Toe)  Patient tolerance:  Patient tolerated the procedure well with no immediate complications      3. Again discussed with patient peripheral vascular disease and changes.  Patient was advised to continue with daily use compression stockings.  Patient will monitor his dietary salt intake and water consumption.  Patient was advised to elevate lower extremities when at rest.  Patient will adjust shoe gear for better comfort and fit to accommodate swelling of the bilateral lower extremity  4. Patient will continue with comfortable shoe gear both inside and outside the home   5. Patient will follow-up in 4 months or p.r.n. for complaints

## 2023-09-25 ENCOUNTER — TELEPHONE (OUTPATIENT)
Dept: FAMILY MEDICINE | Facility: CLINIC | Age: 83
End: 2023-09-25
Payer: MEDICARE

## 2023-10-02 ENCOUNTER — EXTERNAL HOME HEALTH (OUTPATIENT)
Dept: HOME HEALTH SERVICES | Facility: HOSPITAL | Age: 83
End: 2023-10-02
Payer: MEDICARE

## 2023-11-22 ENCOUNTER — EXTERNAL HOME HEALTH (OUTPATIENT)
Dept: HOME HEALTH SERVICES | Facility: HOSPITAL | Age: 83
End: 2023-11-22
Payer: MEDICARE

## 2023-11-30 RX ORDER — FINASTERIDE 5 MG/1
5 TABLET, FILM COATED ORAL
Qty: 90 TABLET | Refills: 3 | Status: SHIPPED | OUTPATIENT
Start: 2023-11-30

## 2023-12-12 ENCOUNTER — DOCUMENT SCAN (OUTPATIENT)
Dept: HOME HEALTH SERVICES | Facility: HOSPITAL | Age: 83
End: 2023-12-12
Payer: MEDICARE

## 2023-12-18 ENCOUNTER — DOCUMENT SCAN (OUTPATIENT)
Dept: HOME HEALTH SERVICES | Facility: HOSPITAL | Age: 83
End: 2023-12-18
Payer: MEDICARE

## 2023-12-22 ENCOUNTER — DOCUMENT SCAN (OUTPATIENT)
Dept: HOME HEALTH SERVICES | Facility: HOSPITAL | Age: 83
End: 2023-12-22
Payer: MEDICARE

## 2023-12-28 ENCOUNTER — DOCUMENT SCAN (OUTPATIENT)
Dept: HOME HEALTH SERVICES | Facility: HOSPITAL | Age: 83
End: 2023-12-28
Payer: MEDICARE

## 2023-12-29 ENCOUNTER — OFFICE VISIT (OUTPATIENT)
Dept: UROLOGY | Facility: CLINIC | Age: 83
End: 2023-12-29
Payer: MEDICARE

## 2023-12-29 DIAGNOSIS — R39.9 UTI SYMPTOMS: ICD-10-CM

## 2023-12-29 DIAGNOSIS — N13.8 BPH WITH URINARY OBSTRUCTION: Primary | ICD-10-CM

## 2023-12-29 DIAGNOSIS — N40.1 BPH WITH URINARY OBSTRUCTION: Primary | ICD-10-CM

## 2023-12-29 DIAGNOSIS — R33.9 INCOMPLETE BLADDER EMPTYING: ICD-10-CM

## 2023-12-29 PROCEDURE — 51798 US URINE CAPACITY MEASURE: CPT | Mod: S$GLB,,, | Performed by: NURSE PRACTITIONER

## 2023-12-29 PROCEDURE — 99214 OFFICE O/P EST MOD 30 MIN: CPT | Mod: S$GLB,,, | Performed by: NURSE PRACTITIONER

## 2023-12-29 PROCEDURE — 87186 SC STD MICRODIL/AGAR DIL: CPT | Mod: 59 | Performed by: NURSE PRACTITIONER

## 2023-12-29 PROCEDURE — 99999 PR PBB SHADOW E&M-EST. PATIENT-LVL III: CPT | Mod: PBBFAC,,, | Performed by: NURSE PRACTITIONER

## 2023-12-29 PROCEDURE — 81003 URINALYSIS AUTO W/O SCOPE: CPT | Mod: QW,S$GLB,, | Performed by: NURSE PRACTITIONER

## 2023-12-29 PROCEDURE — 87088 URINE BACTERIA CULTURE: CPT | Performed by: NURSE PRACTITIONER

## 2023-12-29 PROCEDURE — 87077 CULTURE AEROBIC IDENTIFY: CPT | Performed by: NURSE PRACTITIONER

## 2023-12-29 PROCEDURE — 1101F PT FALLS ASSESS-DOCD LE1/YR: CPT | Mod: CPTII,S$GLB,, | Performed by: NURSE PRACTITIONER

## 2023-12-29 PROCEDURE — 1160F RVW MEDS BY RX/DR IN RCRD: CPT | Mod: CPTII,S$GLB,, | Performed by: NURSE PRACTITIONER

## 2023-12-29 PROCEDURE — 1159F MED LIST DOCD IN RCRD: CPT | Mod: CPTII,S$GLB,, | Performed by: NURSE PRACTITIONER

## 2023-12-29 PROCEDURE — 87086 URINE CULTURE/COLONY COUNT: CPT | Performed by: NURSE PRACTITIONER

## 2023-12-29 PROCEDURE — 3288F FALL RISK ASSESSMENT DOCD: CPT | Mod: CPTII,S$GLB,, | Performed by: NURSE PRACTITIONER

## 2023-12-29 RX ORDER — TAMSULOSIN HYDROCHLORIDE 0.4 MG/1
0.8 CAPSULE ORAL DAILY
Qty: 180 CAPSULE | Refills: 3 | Status: SHIPPED | OUTPATIENT
Start: 2023-12-29 | End: 2024-03-28

## 2023-12-29 NOTE — PROGRESS NOTES
[facility] Note  Staff: MATTHEW Philippe    PCP: ANAIS Hebert    Chief Complaint: Hospital F/UP-UTI    Subjective:        HPI: Giovanni Zavaleta Sr. is a 83 y.o. male presents today in clinic, accompanied by wife for hospital f/up at this time.    Pt recently has been treated for 2 urinary tract infections with 2 different antibiotics and pt cannot seem to get rid of UTIs.  They feel he still has infection as of office visit today.    Patient has history of dementia and is cared for by wife.     Current  meds:  Finasteride 5 mg one tablet daily in the morning.  Flomax 0.4 mg one capsule daily in the evening.    TODAY-12/29/23:  UA performed in office showed ABNORMAL Findings-Small leuks, positive nitrates.  PVR by bladder scan is 212 mL*  Pt has been on 2 different antibiotics for 2 different UTIs within the last month.  Pt's wife can confirm that pt is taking daily  meds without skipping doses at this time.  No gross hematuria noted by pt or family today.     HX:  The pt is an established pt of Dr. Johnson.  Last OV with me was on 3/16/2023 for f/up regarding rechecking urine for previous UTI.    On 02/22/23 the pt was recently evaluated and treated by Dr. Hebert for +UTI of E. Coli >100,000 at that time.  Pt was prescribed Bactrim DS BID for infection and has now completed all the antibiotics as of today's office visit.     No gross hematuria or dysuria complaints today.  Current  meds:  Finasteride 5 mg in am and Tamsulosin 0.4 mg in pm for BPH with LUTS.     Pt's wife is requesting his PSA level to be checked today due to within the last 6 mos to one year the pt has had an large amount of weight loss.  Last Colonoscopy he states was 2 years ago.     UA performed in office today showed normal findings at this time.  Pt today also c/o Right Testicle pain since infection.      HX:   Pt was last evaluated by me on 9/12/22.  Last ov with MD (10/14/2019) he finds his lower urinary  tract symptoms stable on dual medical therapy with Flomax and finasteride.  He does still have some urgency and frequency though these are both better since last evaluation.  He was unable to afford the Myrbetriq and is on oxybutynin.  We did discuss the concerns about oxybutynin at his age with dry eyes, dry mouth, constipation, changes in mentation and risk of dementia.      Cysto/trus 5/22/18  Prostate volume 35.5cc (W 52.2, H 30.2, L 43.1), no median lobe  Cysto: significant lateral lobe obstruction with kissing lobes centrally, no median lobe, short prostatic urethra, grade 1-2 trabeculations  Dual medical therapy flomax+finasteride, with increase of dose of flomax to 0.8mg, and add myrbetriq for urgency. Continue to decrease coffee intake (he is down to 3-4 cups/day)  - return for uroflow/pvr in 3 months, and consider future Urolift vs. TURP     Did not return for UFS.PVR until 7/11/19 and voided only 60cc with PVR 129cc with 2 distinct curves  He returned 7/18/19 for uroflow voiding 196cc (with pvr only 70cc) with Qm 16.2cc/s, Qa 6.8cc/s, 3 intervals - does have good intial bell curve voiding pattern and then 2nd intermittent low peaking curve to empty  He returns today noting:  AUA SS:  16/4 (4:  Weak stream; 3:  Urgency, sleeping; 2:  Frequency, intermittency; 1:  Emptying, straining).  Postvoid residual by bladder scan is 40 cc  He is not taking Myrbetriq due to cost, and is on Ditropan as per Dr. bernard  He has reduced coffee intake some, as he used to drink it up until going to bed, but now has his last cup of coffee between 530 and 6:00 p.m..  He has decreased hot sauce.  His urgency is a little bit better, and he has increased time between urinations  He does have problems with his CPAP and he is awaiting a new mask.  Poor fit.  With mask can get a good 5 hr of sleep.  The is getting injections for macular degeneration.  Has financial stress related to medications medical care.  Not interested in  further intervention at this time.     OV 5/28/21:  PVR by bladder scan: 57 mL  Nocturia 2x nightly  Overall doing well with no complaints voiced.  Current  meds:  Flomax 0.4 mg daily and Finasteride 5 mg daily and tolerating with no problems.     09/2022:  UA in office today--Pt is unable to urinate during ov today.  C/o skin condition on scrotal sac that causes him intermittent pain and irritation.  Has seen a Dermatologist in the past concerning this issue but never improved.     Pt denies gross hematuria or dysuria at this time.  AUA SS:  28/3 Mixed  Feeling of ICBE:  3  Frequency:3  Intermittency:5  Urgency:5  Weak urine stream:4  Straining:3  Nocturia:5  PVR by bladder scan:  148 mL    REVIEW OF SYSTEMS:  A comprehensive 10 system review was performed and is negative except as noted above in HPI    PMHx:  Past Medical History:   Diagnosis Date    CAD (coronary artery disease)     CKD (chronic kidney disease) stage 3, GFR 30-59 ml/min     Diverticulosis     DJD (degenerative joint disease)     HLD (hyperlipidemia)     Hypertension     Macular degeneration     PVD (peripheral vascular disease)      PSHx:  Past Surgical History:   Procedure Laterality Date    ARTERIAL BYPASS SURGRY  2009,2010,2012    Dr Abhishek Lantigua; Peripheral arteries    CAROTID ENDARTERECTOMY Bilateral      5 and 6 years ago.    CHOLECYSTECTOMY      COLON SURGERY  2012    10 inches removed     TONSILLECTOMY      at 19 y/o      Allergies:  Pravastatin    Medications: reviewed   Objective:   There were no vitals filed for this visit.    General:WDWN in NAD  Eyes: PERRLA, normal conjunctiva  Respiratory: no increased work on breathing, clear to auscultation  Cardiovascular: regular rate and rhythm. No obvious extremity edema.  GI: palpation of masses. No tenderness. No hepatosplenomegaly to palpation.  Musculoskeletal: normal range of motion of bilateral upper extremities. Normal muscle strength and tone.  Skin: no obvious rashes or lesions. No  tightening of skin noted.  Neurologic: CN grossly normal. Normal sensation.   Psychiatric: awake, alert and oriented x 3. Mood and affect normal. Cooperative.      Assessment:       1. BPH with urinary obstruction    2. UTI symptoms    3. Incomplete bladder emptying          Plan:   UTI, Incomplete bladder emptying:    Urine Culture to be processed at this time.  Continue Finasteride 5 mg daily in the am.  We will try pt on increasing Flomax to 0.8 mg daily in the afternoon at this time.  I have thoroughly reviewed with pt and family regarding bladder habits vs. Bladder training at home.  Pt and wife verbalized understanding at this time.    F/u--We will contact this pt after we receive and review urine culture results, but plan to f/up with pt in 3-4 weeks to monitor UA and PVR scan at that time.    [facility]: Active    Amie Olvera, ANGELINAC

## 2024-01-01 LAB
BACTERIA UR CULT: ABNORMAL
BACTERIA UR CULT: ABNORMAL

## 2024-01-02 RX ORDER — LEVOFLOXACIN 500 MG/1
500 TABLET, FILM COATED ORAL DAILY
Qty: 10 TABLET | Refills: 0 | Status: SHIPPED | OUTPATIENT
Start: 2024-01-02 | End: 2024-01-12

## 2024-01-03 ENCOUNTER — TELEPHONE (OUTPATIENT)
Dept: UROLOGY | Facility: CLINIC | Age: 84
End: 2024-01-03
Payer: MEDICARE

## 2024-01-03 DIAGNOSIS — N39.0 RECURRENT UTI: ICD-10-CM

## 2024-01-03 DIAGNOSIS — N40.1 BENIGN PROSTATIC HYPERPLASIA WITH INCOMPLETE BLADDER EMPTYING: Primary | ICD-10-CM

## 2024-01-03 DIAGNOSIS — R39.14 BENIGN PROSTATIC HYPERPLASIA WITH INCOMPLETE BLADDER EMPTYING: Primary | ICD-10-CM

## 2024-01-04 DIAGNOSIS — N40.1 BENIGN PROSTATIC HYPERPLASIA WITH INCOMPLETE BLADDER EMPTYING: Primary | ICD-10-CM

## 2024-01-04 DIAGNOSIS — R39.14 BENIGN PROSTATIC HYPERPLASIA WITH INCOMPLETE BLADDER EMPTYING: Primary | ICD-10-CM

## 2024-01-04 NOTE — TELEPHONE ENCOUNTER
NP visit(s)reviewed noting recent UTIs ans elevated residuals    Last worked up with cysto/trus in 2018 to plan bph intervention which he declined and preferred fu uroflow after med changes and lost to f/u and has had intermittent fu    Should note that obstruction from prostate AND uti (especially recurrent) IS a guideline indication for BPH intervention, so will need cyto/trus again to update size of prostate and level of obstruction.     Will need to clear infection first  Currently on levaquin through 1/12 and advised to take flomax 0.8 nightlly  Agree  Has f/u with NP for reeval on 1/30  --Keep this appt. Will recheck LUTS, Ua/Ucx, and PVR and can discuss cysto/trus again    -->Given utis should have CTU before appt - book after abx complete with Cr on arrival (week of 1/22)  --> I have booked cysto/trus for 2/6 contingent upon repeat culture at his f/u visit being negative.  - if culture at fu is positive will r/s

## 2024-01-04 NOTE — PROGRESS NOTES
Procedure Order to Urology [[id number]]    Electronically signed by: Itz Toledo MD on 01/03/24 2016 Status: Active   Ordering user: Itz Toledo MD 01/03/24 2016 Authorized by: Itz Toledo MD   Ordering mode: Standard   Frequency:  01/03/24 -     Diagnoses  Benign prostatic hyperplasia with incomplete bladder emptying [N40.1, R39.14]  Recurrent UTI [N39.0]   Questionnaire    Question Answer   Procedure Cystoscopy Comment - 2/6  TRUS/Trans Rectal Ultrasound   Facility Name: [facility]   [facility], Please order Urine POCT without micro . Local sedation/trus

## 2024-01-04 NOTE — TELEPHONE ENCOUNTER
Spoke w pts wife she was informed of md recs and review np visit  She voiced ok and pt will be informed   Appts scheduled

## 2024-01-24 ENCOUNTER — TELEPHONE (OUTPATIENT)
Dept: UROLOGY | Facility: CLINIC | Age: 84
End: 2024-01-24
Payer: MEDICARE

## 2024-01-24 NOTE — TELEPHONE ENCOUNTER
----- Message from Ailyn Riggins sent at 1/24/2024  8:09 AM CST -----  Regarding: Call back  Type:  Needs Medical Advice    Who Called: Pt Wife      Would the patient rather a call back or a response via [facility]? Call back    Best Call Back Number: [phone] or [phone]    Additional Information: Pt have CT scan and lab work Saturday 1/27 and appt on 1/30@1:30pm Wife want to be sure the results will be available before the visit. Wife needs a call back . Thank you

## 2024-01-26 ENCOUNTER — DOCUMENT SCAN (OUTPATIENT)
Dept: HOME HEALTH SERVICES | Facility: HOSPITAL | Age: 84
End: 2024-01-26
Payer: MEDICARE

## 2024-01-27 ENCOUNTER — HOSPITAL ENCOUNTER (OUTPATIENT)
Dept: RADIOLOGY | Facility: HOSPITAL | Age: 84
Discharge: HOME | End: 2024-01-27
Attending: UROLOGY
Payer: MEDICARE

## 2024-01-27 DIAGNOSIS — N39.0 RECURRENT UTI: ICD-10-CM

## 2024-01-27 PROCEDURE — 74178 CT ABD&PLV WO CNTR FLWD CNTR: CPT | Mod: 26,,, | Performed by: RADIOLOGY

## 2024-01-27 PROCEDURE — 25500020 PHARM REV CODE 255

## 2024-01-27 PROCEDURE — 74178 CT ABD&PLV WO CNTR FLWD CNTR: CPT | Mod: TC

## 2024-01-27 RX ADMIN — IOHEXOL 125 ML: 350 INJECTION, SOLUTION INTRAVENOUS at 03:01

## 2024-01-28 ENCOUNTER — TELEPHONE (OUTPATIENT)
Dept: UROLOGY | Facility: CLINIC | Age: 84
End: 2024-01-28
Payer: MEDICARE

## 2024-01-29 NOTE — TELEPHONE ENCOUNTER
CT urogram with no significant urologic concerns  Should discuss non urologic findings with pcp    As per previous  Has f/u with NP for reeval on 1/30  --Keep this appt. Will recheck LUTS, Ua/Ucx, and PVR and can review details of cysto/trus again which has been booked 2/6 as long as his culture at NP visit is negative    --> though on review moved his appt to 2/2 from 1/30  Would recommend moving up to at least 1/31 so urine can be back in time.

## 2024-01-29 NOTE — TELEPHONE ENCOUNTER
Spoke w pts wife regarding pts results and md recs   Per pts wife they depend on transportation and has already arranged for 2/2 appt date

## 2024-02-02 ENCOUNTER — OFFICE VISIT (OUTPATIENT)
Dept: UROLOGY | Facility: CLINIC | Age: 84
End: 2024-02-02
Payer: MEDICARE

## 2024-02-02 ENCOUNTER — LAB VISIT (OUTPATIENT)
Dept: LAB | Facility: HOSPITAL | Age: 84
End: 2024-02-02
Attending: NURSE PRACTITIONER
Payer: MEDICARE

## 2024-02-02 DIAGNOSIS — N40.1 BENIGN PROSTATIC HYPERPLASIA WITH INCOMPLETE BLADDER EMPTYING: Primary | ICD-10-CM

## 2024-02-02 DIAGNOSIS — N39.0 RECURRENT UTI (URINARY TRACT INFECTION): ICD-10-CM

## 2024-02-02 DIAGNOSIS — N40.1 BENIGN PROSTATIC HYPERPLASIA WITH INCOMPLETE BLADDER EMPTYING: ICD-10-CM

## 2024-02-02 DIAGNOSIS — R39.14 BENIGN PROSTATIC HYPERPLASIA WITH INCOMPLETE BLADDER EMPTYING: ICD-10-CM

## 2024-02-02 DIAGNOSIS — R39.14 BENIGN PROSTATIC HYPERPLASIA WITH INCOMPLETE BLADDER EMPTYING: Primary | ICD-10-CM

## 2024-02-02 LAB
BACTERIA #/AREA URNS HPF: NORMAL /HPF
BILIRUB UR QL STRIP: NEGATIVE
BILIRUBIN, UA POC OHS: NEGATIVE
BILIRUBIN, UA POC OHS: NEGATIVE
BLOOD, UA POC OHS: ABNORMAL
BLOOD, UA POC OHS: NEGATIVE
CLARITY UR: CLEAR
CLARITY, UA POC OHS: CLEAR
CLARITY, UA POC OHS: CLEAR
COLOR UR: YELLOW
COLOR, UA POC OHS: YELLOW
COLOR, UA POC OHS: YELLOW
GLUCOSE UR QL STRIP: NEGATIVE
GLUCOSE, UA POC OHS: NEGATIVE
GLUCOSE, UA POC OHS: NEGATIVE
HGB UR QL STRIP: NEGATIVE
KETONES UR QL STRIP: NEGATIVE
KETONES, UA POC OHS: NEGATIVE
KETONES, UA POC OHS: NEGATIVE
LEUKOCYTE ESTERASE UR QL STRIP: NEGATIVE
LEUKOCYTES, UA POC OHS: ABNORMAL
LEUKOCYTES, UA POC OHS: NEGATIVE
MICROSCOPIC COMMENT: NORMAL
NITRITE UR QL STRIP: NEGATIVE
NITRITE, UA POC OHS: NEGATIVE
NITRITE, UA POC OHS: POSITIVE
PH UR STRIP: 6 [PH] (ref 5–8)
PH, UA POC OHS: 6
PH, UA POC OHS: 6
POC RESIDUAL URINE VOLUME: 120 ML (ref 0–100)
POC RESIDUAL URINE VOLUME: 212 ML (ref 0–100)
PROT UR QL STRIP: NEGATIVE
PROTEIN, UA POC OHS: NEGATIVE
PROTEIN, UA POC OHS: NEGATIVE
SP GR UR STRIP: 1.02 (ref 1–1.03)
SPECIFIC GRAVITY, UA POC OHS: 1.02
SPECIFIC GRAVITY, UA POC OHS: 1.02
SQUAMOUS #/AREA URNS HPF: 1 /HPF
URN SPEC COLLECT METH UR: NORMAL
UROBILINOGEN UR STRIP-ACNC: NEGATIVE EU/DL
UROBILINOGEN, UA POC OHS: 0.2
UROBILINOGEN, UA POC OHS: 0.2
WBC #/AREA URNS HPF: 1 /HPF (ref 0–5)

## 2024-02-02 PROCEDURE — 99999 PR PBB SHADOW E&M-EST. PATIENT-LVL III: CPT | Mod: PBBFAC,,, | Performed by: NURSE PRACTITIONER

## 2024-02-02 PROCEDURE — 3288F FALL RISK ASSESSMENT DOCD: CPT | Mod: CPTII,S$GLB,, | Performed by: NURSE PRACTITIONER

## 2024-02-02 PROCEDURE — 1159F MED LIST DOCD IN RCRD: CPT | Mod: CPTII,S$GLB,, | Performed by: NURSE PRACTITIONER

## 2024-02-02 PROCEDURE — 81000 URINALYSIS NONAUTO W/SCOPE: CPT | Performed by: NURSE PRACTITIONER

## 2024-02-02 PROCEDURE — 81003 URINALYSIS AUTO W/O SCOPE: CPT | Mod: QW,S$GLB,, | Performed by: NURSE PRACTITIONER

## 2024-02-02 PROCEDURE — 87086 URINE CULTURE/COLONY COUNT: CPT | Performed by: NURSE PRACTITIONER

## 2024-02-02 PROCEDURE — 1100F PTFALLS ASSESS-DOCD GE2>/YR: CPT | Mod: CPTII,S$GLB,, | Performed by: NURSE PRACTITIONER

## 2024-02-02 PROCEDURE — 99214 OFFICE O/P EST MOD 30 MIN: CPT | Mod: S$GLB,,, | Performed by: NURSE PRACTITIONER

## 2024-02-02 PROCEDURE — 51798 US URINE CAPACITY MEASURE: CPT | Mod: S$GLB,,, | Performed by: NURSE PRACTITIONER

## 2024-02-02 PROCEDURE — 1160F RVW MEDS BY RX/DR IN RCRD: CPT | Mod: CPTII,S$GLB,, | Performed by: NURSE PRACTITIONER

## 2024-02-02 NOTE — PROGRESS NOTES
[facility] Note  Staff: SRINIVASAN PhilippeP-C    PCP: ELAINE Hebert.  Urologist:  ANAIS Toledo    Chief Complaint: F/UP-Recurrent UTIs, retention, BPH    Subjective:        HPI: Giovanni Zavaleta Sr. is a 83 y.o. male NEW PT presents today for evaluation of LUTS prior to scheduled Cysto procedure next week.    Current  meds:  Finasteride 5 mg one tablet daily in the morning.  Flomax 0.4 mg one capsule daily in the evening.     HX:  The pt is an established pt of Dr. Toledo.  Last OV with me was on 12/29/2023 for f/up regarding rechecking urine for previous UTI.     On 02/22/23 the pt was recently evaluated and treated by Dr. Hebert for +UTI of E. Coli >100,000 at that time.  Pt was prescribed Bactrim DS BID for infection and has now completed all the antibiotics as of today's office visit.     No gross hematuria or dysuria complaints today.  Current  meds:  Finasteride 5 mg in am and Tamsulosin 0.4 mg in pm for BPH with LUTS.     Pt's wife is requesting his PSA level to be checked today due to within the last 6 mos to one year the pt has had an large amount of weight loss.  Last Colonoscopy he states was 2 years ago.     UA performed in office today showed normal findings at this time.  Pt today also c/o Right Testicle pain since infection.     Last ov with MD (10/14/2019) he finds his lower urinary tract symptoms stable on dual medical therapy with Flomax and finasteride.  He does still have some urgency and frequency though these are both better since last evaluation.  He was unable to afford the Myrbetriq and is on oxybutynin.  We did discuss the concerns about oxybutynin at his age with dry eyes, dry mouth, constipation, changes in mentation and risk of dementia.      Cysto/trus 5/22/18  Prostate volume 35.5cc (W 52.2, H 30.2, L 43.1), no median lobe  Cysto: significant lateral lobe obstruction with kissing lobes centrally, no median lobe, short prostatic urethra, grade 1-2  trabeculations  Dual medical therapy flomax+finasteride, with increase of dose of flomax to 0.8mg, and add myrbetriq for urgency. Continue to decrease coffee intake (he is down to 3-4 cups/day)  - return for uroflow/pvr in 3 months, and consider future Urolift vs. TURP     Did not return for UFS.PVR until 7/11/19 and voided only 60cc with PVR 129cc with 2 distinct curves  He returned 7/18/19 for uroflow voiding 196cc (with pvr only 70cc) with Qm 16.2cc/s, Qa 6.8cc/s, 3 intervals - does have good intial bell curve voiding pattern and then 2nd intermittent low peaking curve to empty  He returns today noting:  AUA SS:  16/4 (4:  Weak stream; 3:  Urgency, sleeping; 2:  Frequency, intermittency; 1:  Emptying, straining).  Postvoid residual by bladder scan is 40 cc  He is not taking Myrbetriq due to cost, and is on Ditropan as per Dr. bernard  He has reduced coffee intake some, as he used to drink it up until going to bed, but now has his last cup of coffee between 530 and 6:00 p.m..  He has decreased hot sauce.  His urgency is a little bit better, and he has increased time between urinations  He does have problems with his CPAP and he is awaiting a new mask.  Poor fit.  With mask can get a good 5 hr of sleep.  The is getting injections for macular degeneration.  Has financial stress related to medications medical care.  Not interested in further intervention at this time.     OV 5/28/21:  PVR by bladder scan: 57 mL  Nocturia 2x nightly  Overall doing well with no complaints voiced.  Current  meds:  Flomax 0.4 mg daily and Finasteride 5 mg daily and tolerating with no problems.     09/2022:  UA in office today--Pt is unable to urinate during ov today.  C/o skin condition on scrotal sac that causes him intermittent pain and irritation.  Has seen a Dermatologist in the past concerning this issue but never improved.  Pt denies gross hematuria or dysuria at this time.  AUA SS:  28/3 Mixed  Feeling of ICBE:   3  Frequency:3  Intermittency:5  Urgency:5  Weak urine stream:4  Straining:3  Nocturia:5  PVR by bladder scan:  148 mL     OV 12/29/23-Was a hospital f/up:  UA performed in office showed ABNORMAL Findings-Small leuks, positive nitrates.  PVR by bladder scan is 212 mL*  Pt has been on 2 different antibiotics for 2 different UTIs within the last month.  Pt's wife can confirm that pt is taking daily  meds without skipping doses at this time.  No gross hematuria noted by pt or family today.    2/2/24:  Pt is scheduled for future Cysto with MD for 2/6/24-next week.  UA done in office today showed trace-intact blood, otherwise normal findings.  PVR is 120 mL*  Pt denies gross hematuria and dysuria at this time.     REVIEW OF SYSTEMS:  A comprehensive 10 system review was performed and is negative except as noted above in HPI    PMHx:  Past Medical History:   Diagnosis Date    CAD (coronary artery disease)     CKD (chronic kidney disease) stage 3, GFR 30-59 ml/min     Diverticulosis     DJD (degenerative joint disease)     HLD (hyperlipidemia)     Hypertension     Macular degeneration     PVD (peripheral vascular disease)        PSHx:  Past Surgical History:   Procedure Laterality Date    ARTERIAL BYPASS SURGRY  2009,2010,2012    Dr Abhishek Lantigua; Peripheral arteries    CAROTID ENDARTERECTOMY Bilateral      5 and 6 years ago.    CHOLECYSTECTOMY      COLON SURGERY  2012    10 inches removed     TONSILLECTOMY      at 19 y/o        Allergies:  Pravastatin    Medications: reviewed     Objective:   There were no vitals filed for this visit.    General:WDWN in NAD  Eyes: PERRLA, normal conjunctiva  Respiratory: no increased work on breathing, clear to auscultation  Cardiovascular: regular rate and rhythm. No obvious extremity edema.  GI: palpation of masses. No tenderness. No hepatosplenomegaly to palpation.  Musculoskeletal: normal range of motion of bilateral upper extremities. Normal muscle strength and tone.  Skin: no  obvious rashes or lesions. No tightening of skin noted.  Neurologic: CN grossly normal. Normal sensation.   Psychiatric: awake, alert and oriented x 3. Mood and affect normal. Cooperative.        Assessment:       1. Benign prostatic hyperplasia with incomplete bladder emptying    2. Recurrent UTI (urinary tract infection)          Plan:     UA, UA Micro and Urine Culture to be dropped off by pt to OP lab today for testing prior to procedure next week.    I have reiterated to pt during ov today about further evaluation and treatment of his current lower urinary tract symptoms due to hx of repetitive urinary retention issues and significant recurrent urinary tract infections.    Procedure in detail of Cystoscopy procedure including the use of local anesthesia with xylocaine jelly was again described to the patient and all questions answered which pt and wife as of today's ov has agreed to proceed at this time.    F/UP as scheduled, cysto for 2/6/24.  Pt and family verbalized understanding.    Amie Olvera, SRINIVASANP-C

## 2024-02-05 LAB — BACTERIA UR CULT: NO GROWTH

## 2024-02-06 ENCOUNTER — HOSPITAL ENCOUNTER (OUTPATIENT)
Facility: HOSPITAL | Age: 84
Discharge: HOME | End: 2024-02-06
Attending: UROLOGY | Admitting: UROLOGY
Payer: MEDICARE

## 2024-02-06 VITALS
RESPIRATION RATE: 17 BRPM | TEMPERATURE: 99 F | DIASTOLIC BLOOD PRESSURE: 77 MMHG | SYSTOLIC BLOOD PRESSURE: 184 MMHG | HEIGHT: 67 IN | BODY MASS INDEX: 27.3 KG/M2 | WEIGHT: 173.94 LBS | OXYGEN SATURATION: 92 % | HEART RATE: 74 BPM

## 2024-02-06 DIAGNOSIS — R39.14 BENIGN PROSTATIC HYPERPLASIA WITH INCOMPLETE BLADDER EMPTYING: Primary | ICD-10-CM

## 2024-02-06 DIAGNOSIS — R39.14 BENIGN PROSTATIC HYPERPLASIA WITH INCOMPLETE BLADDER EMPTYING: ICD-10-CM

## 2024-02-06 DIAGNOSIS — N40.1 BENIGN PROSTATIC HYPERPLASIA WITH INCOMPLETE BLADDER EMPTYING: ICD-10-CM

## 2024-02-06 DIAGNOSIS — N40.1 BENIGN PROSTATIC HYPERPLASIA WITH INCOMPLETE BLADDER EMPTYING: Primary | ICD-10-CM

## 2024-02-06 LAB
BILIRUBIN, UA POC OHS: NEGATIVE
BLOOD, UA POC OHS: NEGATIVE
CLARITY, UA POC OHS: CLEAR
COLOR, UA POC OHS: YELLOW
GLUCOSE, UA POC OHS: NEGATIVE
KETONES, UA POC OHS: NEGATIVE
LEUKOCYTES, UA POC OHS: NEGATIVE
NITRITE, UA POC OHS: NEGATIVE
PH, UA POC OHS: 5.5
PROTEIN, UA POC OHS: NEGATIVE
SPECIFIC GRAVITY, UA POC OHS: 1.02
UROBILINOGEN, UA POC OHS: 0.2

## 2024-02-06 PROCEDURE — 52000 CYSTOURETHROSCOPY: CPT | Mod: ,,, | Performed by: UROLOGY

## 2024-02-06 PROCEDURE — 76872 US TRANSRECTAL: CPT | Performed by: UROLOGY

## 2024-02-06 PROCEDURE — 52000 CYSTOURETHROSCOPY: CPT | Performed by: UROLOGY

## 2024-02-06 PROCEDURE — A4217 STERILE WATER/SALINE, 500 ML: HCPCS | Performed by: UROLOGY

## 2024-02-06 PROCEDURE — 76872 US TRANSRECTAL: CPT | Mod: 26,,, | Performed by: UROLOGY

## 2024-02-06 PROCEDURE — 25000003 PHARM REV CODE 250: Performed by: UROLOGY

## 2024-02-06 RX ORDER — CLOTRIMAZOLE AND BETAMETHASONE DIPROPIONATE 10; .64 MG/G; MG/G
CREAM TOPICAL 2 TIMES DAILY
Qty: 45 G | Refills: 0 | Status: SHIPPED | OUTPATIENT
Start: 2024-02-06

## 2024-02-06 RX ORDER — WATER 1 ML/ML
IRRIGANT IRRIGATION
Status: DISCONTINUED | OUTPATIENT
Start: 2024-02-06 | End: 2024-02-06 | Stop reason: HOSPADM

## 2024-02-06 RX ORDER — CEFUROXIME AXETIL 250 MG/1
250 TABLET ORAL DAILY
Qty: 90 TABLET | Refills: 0 | Status: SHIPPED | OUTPATIENT
Start: 2024-02-06

## 2024-02-06 RX ORDER — LIDOCAINE HYDROCHLORIDE 20 MG/ML
JELLY TOPICAL
Status: DISCONTINUED | OUTPATIENT
Start: 2024-02-06 | End: 2024-02-06 | Stop reason: HOSPADM

## 2024-02-06 NOTE — PROGRESS NOTES
Procedure Order to Urology [[id number]]    Electronically signed by: Itz Toledo MD on 02/06/24 1536 Status: Active   Ordering user: Itz Toledo MD 02/06/24 1536 Ordering provider: Itz Toledo MD   Authorized by: Itz Toledo MD Ordering mode: Standard   Frequency:  02/06/24 -     Acknowledged: Chelsi Torres RN 02/06/24 1536 for Placing Order   Diagnoses  Benign prostatic hyperplasia with incomplete bladder emptying [N40.1, R39.14]   Questionnaire    Question Answer   Procedure Urolift Comment - 3/14   Facility Name: [facility], please order, CBC, BMP, PT/ INR ,U/A and culture ,EKG if over 40  IV start, NPO, general anesthesia, Ancef 2 gram ( alternative for pcn allergy is Cipro 400mg IV ) cpt-[id number] and [id number]

## 2024-02-06 NOTE — H&P
[facility] Note  Staff: MATTHEW Philippe     PCP: ANAIS Hebert     Chief Complaint: Hospital F/UP-UTI     Subjective:         Subjective    HPI: Giovanni Zavaleta Sr. is a 83 y.o. male presents today in clinic, accompanied by wife for hospital f/up at this time.     Pt recently has been treated for 2 urinary tract infections with 2 different antibiotics and pt cannot seem to get rid of UTIs.  They feel he still has infection as of office visit today.     Patient has history of dementia and is cared for by wife.      Current  meds:  Finasteride 5 mg one tablet daily in the morning.  Flomax 0.4 mg one capsule daily in the evening.     TODAY-12/29/23:  UA performed in office showed ABNORMAL Findings-Small leuks, positive nitrates.  PVR by bladder scan is 212 mL*  Pt has been on 2 different antibiotics for 2 different UTIs within the last month.  Pt's wife can confirm that pt is taking daily  meds without skipping doses at this time.  No gross hematuria noted by pt or family today.      HX:  The pt is an established pt of Dr. Johnson.  Last OV with me was on 3/16/2023 for f/up regarding rechecking urine for previous UTI.     On 02/22/23 the pt was recently evaluated and treated by Dr. Hebert for +UTI of E. Coli >100,000 at that time.  Pt was prescribed Bactrim DS BID for infection and has now completed all the antibiotics as of today's office visit.     No gross hematuria or dysuria complaints today.  Current  meds:  Finasteride 5 mg in am and Tamsulosin 0.4 mg in pm for BPH with LUTS.     Pt's wife is requesting his PSA level to be checked today due to within the last 6 mos to one year the pt has had an large amount of weight loss.  Last Colonoscopy he states was 2 years ago.     UA performed in office today showed normal findings at this time.  Pt today also c/o Right Testicle pain since infection.      HX:   Pt was last evaluated by me on 9/12/22.  Last ov with MD (10/14/2019) he  finds his lower urinary tract symptoms stable on dual medical therapy with Flomax and finasteride.  He does still have some urgency and frequency though these are both better since last evaluation.  He was unable to afford the Myrbetriq and is on oxybutynin.  We did discuss the concerns about oxybutynin at his age with dry eyes, dry mouth, constipation, changes in mentation and risk of dementia.      Cysto/trus 5/22/18  Prostate volume 35.5cc (W 52.2, H 30.2, L 43.1), no median lobe  Cysto: significant lateral lobe obstruction with kissing lobes centrally, no median lobe, short prostatic urethra, grade 1-2 trabeculations  Dual medical therapy flomax+finasteride, with increase of dose of flomax to 0.8mg, and add myrbetriq for urgency. Continue to decrease coffee intake (he is down to 3-4 cups/day)  - return for uroflow/pvr in 3 months, and consider future Urolift vs. TURP     Did not return for UFS.PVR until 7/11/19 and voided only 60cc with PVR 129cc with 2 distinct curves  He returned 7/18/19 for uroflow voiding 196cc (with pvr only 70cc) with Qm 16.2cc/s, Qa 6.8cc/s, 3 intervals - does have good intial bell curve voiding pattern and then 2nd intermittent low peaking curve to empty  He returns today noting:  AUA SS:  16/4 (4:  Weak stream; 3:  Urgency, sleeping; 2:  Frequency, intermittency; 1:  Emptying, straining).  Postvoid residual by bladder scan is 40 cc  He is not taking Myrbetriq due to cost, and is on Ditropan as per Dr. bernard  He has reduced coffee intake some, as he used to drink it up until going to bed, but now has his last cup of coffee between 530 and 6:00 p.m..  He has decreased hot sauce.  His urgency is a little bit better, and he has increased time between urinations  He does have problems with his CPAP and he is awaiting a new mask.  Poor fit.  With mask can get a good 5 hr of sleep.  The is getting injections for macular degeneration.  Has financial stress related to medications medical  care.  Not interested in further intervention at this time.     OV 5/28/21:  PVR by bladder scan: 57 mL  Nocturia 2x nightly  Overall doing well with no complaints voiced.  Current  meds:  Flomax 0.4 mg daily and Finasteride 5 mg daily and tolerating with no problems.     09/2022:  UA in office today--Pt is unable to urinate during ov today.  C/o skin condition on scrotal sac that causes him intermittent pain and irritation.  Has seen a Dermatologist in the past concerning this issue but never improved.     Pt denies gross hematuria or dysuria at this time.  AUA SS:  28/3 Mixed  Feeling of ICBE:  3  Frequency:3  Intermittency:5  Urgency:5  Weak urine stream:4  Straining:3  Nocturia:5  PVR by bladder scan:  148 mL     REVIEW OF SYSTEMS:  A comprehensive 10 system review was performed and is negative except as noted above in HPI     PMHx:       Past Medical History:   Diagnosis Date    CAD (coronary artery disease)      CKD (chronic kidney disease) stage 3, GFR 30-59 ml/min      Diverticulosis      DJD (degenerative joint disease)      HLD (hyperlipidemia)      Hypertension      Macular degeneration      PVD (peripheral vascular disease)        PSHx:        Past Surgical History:   Procedure Laterality Date    ARTERIAL BYPASS SURGRY   2009,2010,2012     Dr Abhishek Lantigua; Peripheral arteries    CAROTID ENDARTERECTOMY Bilateral        5 and 6 years ago.    CHOLECYSTECTOMY        COLON SURGERY   2012     10 inches removed     TONSILLECTOMY         at 19 y/o       Allergies:  Pravastatin     Medications: reviewed   Objective:   There were no vitals filed for this visit.     General:WDWN in NAD  Eyes: PERRLA, normal conjunctiva  Respiratory: no increased work on breathing, clear to auscultation  Cardiovascular: regular rate and rhythm. No obvious extremity edema.  GI: palpation of masses. No tenderness. No hepatosplenomegaly to palpation.  Musculoskeletal: normal range of motion of bilateral upper extremities. Normal  muscle strength and tone.  Skin: no obvious rashes or lesions. No tightening of skin noted.  Neurologic: CN grossly normal. Normal sensation.   Psychiatric: awake, alert and oriented x 3. Mood and affect normal. Cooperative.        Assessment:         Assessment   1. BPH with urinary obstruction    2. UTI symptoms    3. Incomplete bladder emptying             Plan:   UTI, Incomplete bladder emptying:     Urine Culture to be processed at this time.  Continue Finasteride 5 mg daily in the am.  We will try pt on increasing Flomax to 0.8 mg daily in the afternoon at this time.  I have thoroughly reviewed with pt and family regarding bladder habits vs. Bladder training at home.  Pt and wife verbalized understanding at this time.     F/u--We will contact this pt after we receive and review urine culture results, but plan to f/up with pt in 3-4 weeks to monitor UA and PVR scan at that time.      NP visit(s)reviewed noting recent UTIs ans elevated residuals     Last worked up with cysto/trus in 2018 to plan bph intervention which he declined and preferred fu uroflow after med changes and lost to f/u and has had intermittent fu     Should note that obstruction from prostate AND uti (especially recurrent) IS a guideline indication for BPH intervention, so will need cyto/trus again to update size of prostate and level of obstruction.      Will need to clear infection first  Currently on levaquin through 1/12 and advised to take flomax 0.8 nightlly  Agree  Has f/u with NP for reeval on 1/30  --Keep this appt. Will recheck LUTS, Ua/Ucx, and PVR and can discuss cysto/trus again    -->Given utis should have CTU before appt - book after abx complete with Cr on arrival (week of 1/22)  --> I have booked cysto/trus for 2/6 contingent upon repeat culture at his f/u visit being negative.  - if culture at fu is positive will r/s    CT urogram with no significant urologic concerns  Should discuss non urologic findings with pcp     As per  previous  Has f/u with NP for reeval on 1/30  --Keep this appt. Will recheck LUTS, Ua/Ucx, and PVR and can review details of cysto/trus again which has been booked 2/6 as long as his culture at NP visit is negative    2/2  UA done in office today showed trace-intact blood, otherwise normal findings.  PVR is 120 mL*  Pt denies gross hematuria and dysuria at this time.     Ucx neg    Here for cysto/trus

## 2024-02-06 NOTE — PLAN OF CARE
Discharge instructions given to pt/wife, verbalized understanding.  Tolerating fluids.  Denies pain. Uroliftf 3/14.  Prescriptions sent to pharmacy per dr julio.  Ambulating out with wife per walker out to lobby; waiting for transportation to .

## 2024-02-11 NOTE — OP NOTE
[facility] Urology Operative/Brief Discharge Note     Date: 2/6/24     Staff Surgeon: Itz Toledo MD     Pre-Op Diagnosis:   BPH with LUTS and incomplete emptying  Recurrent UTI     Post-Op Diagnosis:   same     Procedure(s) Performed:   Cystoscopy, flexible  Transrectal ultrasound with volumetric measurement of prostate     Specimen(s): none     Anesthesia: local, 2% xylocaine jelly urojet     Findings:   Volume 25.36 cm3 (W 46.52mm, H 25.15mm, L 41.44mm), no med lobe, moderate PVR  Cysto:  Significant lateral lobe obstruction proximally and distally, asymmetric right > left, without any significant bladder neck elevation, and no intravesical extension.  When bladder over distended, decent separation of lateral lobes centrally.  Significant diffuse trabeculations with early scattered cellules.      Estimated Blood Loss: none     Drains: none     Complications: none     Indications for procedure:  82yo M with long history of BPH/LUTS my previously worked up in 2018 for refractory symptoms as despite dual medical therapy with Flomax and finasteride still had severe symptoms and urgency, and was found to have a 35 g prostate with short prostatic urethra but significant lateral lobe obstruction, and discussed minimally invasive BPH intervention at that time given the refractory nature of his symptoms and incomplete emptying.  However at that time he elected to increased dose of alpha blocker for maximum medical therapy and decrease his coffee intake.  On follow-up, irritative symptoms such as urgency and frequency progressed and he still preferred adding overactive bladder agent rather than intervention.  Has been seen in follow-up many times with medical management changes and changes in his anticholinergic or OAB medication, however this has to be stopped secondary to elevated postvoid residuals, and recurrent urinary tract infections within the last 1-2 years.  This is correlated with his incomplete emptying, and  Event Note since discontinuing VESIcare his PVRs are in half but still greater than 100 cc, and he presents today for repeat lower tract evaluation to help guide further recommendations.     Procedure in detail:  After informed consent, the patient was placed in left lateral decubitus position. Transrectal ultrasound probe was passed into the rectum and the prostate was visualized on the screen.  Three-dimensional measurements taken as above with reported prostate volume as documented.  Pertinent ultrasound findings noted above, with absence of median lobe, and No ultrasonic abnormalities of prostate were visualized. Transverse and saggital image captured.  The ultrasound probe was removed     He was then placed in supine position and prepped and drapped in standard cystoscopic fashion and 2% xylocaine jelly was instilled into the urethra.  A flexible cystoscope was passed into the bladder via the urethra.      Anterior urethra normal without abnormality.. The prostatic urethra demonstrated significant obstruction as described above in findings, with lateral lobe obstruction present with the absence of median lobe, as confirmed on retroflexion      Bladder otherwise systematically inspected and no mucosal lesions or tumors seen.  Bladder was also assessed for signs of chronic obstruction such as trabeculations, cellules, diverticulum, of which pertinent findings are noted above with extensive signs of chronic obstruction.  Bilateral ureteral orifices seen in orthotopic position on trigone bilaterally with clear efflux.       Patient tolerated the procedure well. No complications     Disposition:  Significant BPH with obstruction.  As well as many changes of longterm obstruction seen at bladder level of which we discuss the contributions to his incomplete emptying and irritative symptoms such as urgency frequency.  We also reviewed that recurrent urinary tract infections in the setting of obstruction is an indication for relief  Tertiary Exam/Event Note of obstruction.  Certainly his incomplete emptying as a result of the obstruction and obstructive changes is contributing to this.  As he has failed maximal medical therapy, has now had to discontinue OAB agent secondary to his high postvoid residuals, we Discussed options for BPH intervention such as urolift, rezum, tuip, turp. He is interested in proceeding with Urolift after discussion of all risks, benefits, and alternatives.  Especially given minimally invasive nature in the setting of his other comorbidities.     Procedure in detail discussed. Discussed risks including but not limited to hematuria, postop retention, pain, infection, injury to bladder or urethra, and worsening urgency, latent pelvic pain, no guarantee of symptomatic relief, prostate regrowth, need for future procedures. We did discuss the non-incidence of ejaculatory dysfunction, as well as the 13% recurrence risk at 5 years. Also discussed about 20% of people may need a catheter after (due to retention or hematuria) but not required if voiding postop, though plan to leave alfred prophylactically overnight, which he is agreeable to.     Discussed that symptomatic relief may take longer to be fully appreciated, in the setting of longer standing obstruction, and period of symptomatic adjustment postop. Also discussed transient increases in urgency frequency which may yield transient UUI in postop period as discussed above        All questions patient had answered and appropriate informed consent obtained.   Scheduled 3/14/23    Has been followed by Dr. Botello for peripheral vascular disease including bilateral carotid stenosis and CEA, and is chronically anticoagulated with Plavix and will need clearance to hold this one-week prior.  Will cc for clearance request    In interim, as he was able to clear his recent infection and emptying is better, have placed him on a 90 day course of daily prophylactic antibiotics after review of his previous  cultures to prevent any interim infection.  He did also request some topical agent for excoriation bilateral groin, which on exam was consistent with fungal rash.  Advised will give Lotrisone cream, but if it did not improve should see PCP or dermatology.  Discuss the importance of penile hygiene and retracting skin with each void, and with every bathing.     Discharge home today status post uncomplicated procedure as above  Diet - resume home diet  Follow up: urolift 3/14/24  - clearance prior from vascular/Botello to hold anticoag  Instructions:    Drink plenty of water, may see blood in urine, complete prophylactic antibiotics.  Hold asa/fish oil/bloodthinners 1 week prior to urolift  Meds:     Medication List        START taking these medications      cefUROXime 250 MG tablet  Commonly known as: CEFTIN  Take 1 tablet (250 mg total) by mouth once daily.     clotrimazole-betamethasone 1-0.05% cream  Commonly known as: LOTRISONE  Apply topically 2 (two) times daily. To groin creases x3-4 weeks max or until resolution            CONTINUE taking these medications      ascorbic acid (vitamin C) 100 MG tablet  Commonly known as: VITAMIN C     aspirin 81 MG Chew     ciclopirox 8 % Soln  Commonly known as: PENLAC     clopidogreL 75 mg tablet  Commonly known as: PLAVIX     coenzyme Q10 100 mg capsule     fenofibrate micronized 134 MG Cap  Commonly known as: LOFIBRA  Take 1 capsule (134 mg total) by mouth daily with breakfast.     finasteride 5 mg tablet  Commonly known as: PROSCAR  TAKE 1 TABLET EVERY DAY     fluticasone propionate 50 mcg/actuation nasal spray  Commonly known as: FLONASE  2 sprays (100 mcg total) by Each Nostril route once daily at 6am.     hydrocortisone 1 % lotion     ketorolac 0.5% 0.5 % Drop  Commonly known as: ACULAR     labetaloL 200 MG tablet  Commonly known as: NORMODYNE  TAKE 1 TABLET EVERY DAY     LORazepam 1 MG tablet  Commonly known as: ATIVAN     melatonin 3 mg Cap     OCUVITE WITH LUTEIN  Tab  Generic drug: vit A,C & E-lutein-minerals 1,000-60-2 unit     rosuvastatin 10 MG tablet  Commonly known as: CRESTOR  TAKE 1 TABLET EVERY EVENING     tamsulosin 0.4 mg Cap  Commonly known as: FLOMAX  Take 2 capsules (0.8 mg total) by mouth once daily. Take in evening.     triamcinolone acetonide 0.1% 0.1 % cream  Commonly known as: KENALOG     vitamin D 1000 units Tab  Commonly known as: VITAMIN D3               Where to Get Your Medications        These medications were sent to [facility]/pharmacy #[id number] - [place], [place] - [address] AT [facility]  [address], [place] [address]      Phone: [phone]   cefUROXime 250 MG tablet  clotrimazole-betamethasone 1-0.05% cream

## 2024-02-15 ENCOUNTER — EXTERNAL HOME HEALTH (OUTPATIENT)
Dept: HOME HEALTH SERVICES | Facility: HOSPITAL | Age: 84
End: 2024-02-15
Payer: MEDICARE

## 2024-02-28 ENCOUNTER — TELEPHONE (OUTPATIENT)
Dept: UROLOGY | Facility: CLINIC | Age: 84
End: 2024-02-28
Payer: MEDICARE

## 2024-02-28 DIAGNOSIS — Z01.818 PREOPERATIVE CLEARANCE: ICD-10-CM

## 2024-02-28 DIAGNOSIS — R94.31 ABNORMAL EKG: Primary | ICD-10-CM

## 2024-02-28 NOTE — TELEPHONE ENCOUNTER
----- Message from Raheem Bear sent at 2/28/2024  1:58 PM CST -----  Regarding: rt call  Type:  Patient Returning Call    Who Called:ernestina monte     Who Left Message for Patient:unknown    Does the patient know what this is regarding?:yes    Best Call Back Number:[phone] or [phone]      Additional Information: ernestina monte wants a call back to discuss getting a referral to a cardio.

## 2024-02-28 NOTE — TELEPHONE ENCOUNTER
Po note dated 2/6  Discharge home today status post uncomplicated procedure as above  Diet - resume home diet  Follow up: urolift 3/14/24  - clearance prior from vascular/Ayan to hold dino    Spoke w pts wife and inquired about her request for referral for cardio cl   She voiced that pt was recently seen by pcp and EKG was abnormal    Pts wife voiced they will need to postpone his procedure until they can get in with cardio.    Informed referral will need to come from pcp and then can be scheduled with cardio in [place] as pts wife is declining seeing pcp in [place].    Informed pts wife will call back with md nunez and reschedule dates   She vu

## 2024-02-28 NOTE — TELEPHONE ENCOUNTER
"             12/27/2022    Patient Information  Hammad Justice                                                                                          [address]  [facility] [address]      1962  [unfilled]  There is no work phone number on file.    Chief Complaint:     Complaining of cough, productive, chest congestion.    History of Present Illness:    Patient is a cigarette smoker he has some COPD and he presents today with a 1 week history of chest congestion, cough productive of green phlegm and associated with wheezing and shortness of breath.  He may have had a low-grade fever.  No shaking chills.  He went to the urgent care and received a brief mild dose of prednisone and some cough syrup.  They did not give him an antibiotic because \"he had only had the symptoms for 2 days\".    Review of Systems   Constitutional: Negative. Negative for chills, fever and night sweats.   HENT: Negative.    Eyes: Negative.    Cardiovascular: Positive for dyspnea on exertion.   Respiratory: Positive for cough, shortness of breath, sputum production and wheezing. Negative for hemoptysis.    Endocrine: Negative.    Hematologic/Lymphatic: Negative.    Skin: Negative.    Musculoskeletal: Negative.    Gastrointestinal: Negative.    Genitourinary: Negative.    Neurological: Negative.    Psychiatric/Behavioral: Negative.    Allergic/Immunologic: Negative.        Active Problems:    Patient Active Problem List   Diagnosis   • Chronic obstructive pulmonary disease (COPD)   • History of colon polyps, 7/28/2020--tubular adenoma x 2.  Hyperplastic x 3.  09/12/2013--hyperplastic ×1.   • Generalized osteoarthritis of multiple sites   • Hyperlipidemia   • Cigarette nicotine dependence with nicotine-induced disorder   • Vitamin D deficiency   • Therapeutic drug monitoring   • Routine physical examination   • Encounter for smoking cessation counseling   • Polycythemia secondary to smoking   • Benign prostatic hyperplasia with weak " Spoke w pts wife she was informed of referral placed   Pts wife vu  Pts wife voiced she would rather delay surgery   Dnt want everything rushed   Cardio scheduled pt for 3/7   Pts wife informed    urinary stream   • Impaired fasting glucose   • COPD with exacerbation (HCC)   • Chronic fatigue         Past Medical History:   Diagnosis Date   • 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  He reports he just takes a long time for him to initiate and complete urination.  He has no pain or dysuria.  No blood noted in his urine.  Urinalysis today reveals 31-50 WBCs but there are 3-6 epithelial cells present as well as 1+ calcium oxalate crystals.  No bacteria seen.  No red bl   • Chronic obstructive pulmonary disease (COPD) 08/12/2013 08/12/2013--pulmonary function tests revealed FVC 3.35, 66% of predicted. Post bronchodilator FVC 3.31. FEV1 1.77, 45% of predicted. 1.8 to post bronchodilator. FEV1/FVC 53, Total lung capacity is 105% of predicted. Normal diffusion. Moderately severe obstructive pattern with no reversibility.   08/12/2013--pulmonary consult. Pulmonary function tests revealed moderate to severe obstructive pattern   • Cigarette nicotine dependence with nicotine-induced disorder 06/14/2016   • COPD (chronic obstructive pulmonary disease) (HCC) 08/12/2013 08/12/2013--pulmonary function tests revealed FVC 3.35, 66% of predicted. Post bronchodilator FVC 3.31. FEV1 1.77, 45% of predicted. 1.8 to post bronchodilator. FEV1/FVC 53, Total lung capacity is 105% of predicted. Normal diffusion. Moderately severe obstructive pattern with no reversibility.   08/12/2013--pulmonary consult. Pulmonary function tests revealed moderate to severe obstructive pattern. Diagnosis COPD. Chronic bronchitis type. Patient was started on Ventolin when necessary and Spiriva HandiHaler use once daily.   • Generalized osteoarthritis of multiple sites 06/14/2016   • History of Benign essential hypertension 04/04/2012 04/04/2012--blood pressure remains low and patient had to discontinue lisinopril  because of dizziness.   03/14/2012--blood pressure too low at 102/64. Lisinopril HCT decreased to 10/12.5.   02/22/2012--blood pressure elevated at 140/90. Lisinopril HCT 20/12.5 daily initiated.   • History of colon polyps, 7/28/2020--tubular adenoma x 2.  Hyperplastic x 3.  09/12/2013--hyperplastic ×1. 09/12/2013 July 28, 2020--colonoscopy revealed a 1 mm polyp in the rectum.  #4, 1 to 3 mm polyps in the rectosigmoid colon.  #1, 1 mm polyp in the rectum.  Polyps were removed.  Pathology returned tubular adenoma in the proximal ascending and sigmoid colon.  Hyperplastic x3.  09/12/2013--colonoscopy revealed a 3-4 mm polyp in the sigmoid colon which was biopsied and removed. Otherwise, normal colonoscopy to    • History of pulmonary function tests 08/12/2013 08/12/2013--pulmonary function tests revealed FVC 3.35, 66% of predicted. Post bronchodilator FVC 3.31. FEV1 1.77, 45% of predicted. 1.8 to post bronchodilator. FEV1/FVC 53, Total lung capacity is 105% of predicted. Normal diffusion. Moderately severe obstructive pattern with no reversibility.   • Hyperlipidemia 06/14/2016   • Impaired fasting glucose 03/20/2019   • Nicotine dependence 06/14/2016   • Polycythemia secondary to smoking 07/07/2016 07/07/2016--routine physical exam.  CBC reveals hemoglobin elevated at 17.9 and hematocrit elevated at 53.5.  Patient has a somewhat heavy smoker and I feel this is the etiology of this and will not pursue hematologic workup, at least at the present time.  Patient is in the process of trying to discontinue cigarettes.  Aspirin 81 mg per day initiated.   • Vitamin D deficiency 06/14/2016         Past Surgical History:   Procedure Laterality Date   • COLONOSCOPY  09/12/2013 09/12/2013--colonoscopy revealed a 3-4 mm polyp in the sigmoid colon which was biopsied and removed. Otherwise, normal colonoscopy to cecum with an excellent prep. Pathology returned hyperplastic.   • COLONOSCOPY N/A 7/28/2020 July 28,  --colonoscopy revealed a 1 mm polyp in the rectum.  #4, 1 to 3 mm polyps in the rectosigmoid colon.  #1, 1 mm polyp in the rectum.  Polyps were removed.  Pathology returned tubular adenoma in the proximal ascending and sigmoid colon.  Hyperplastic x3.   • KNEE ARTHROSCOPY Right 31 years of age    Age 31--right meniscus surgery.         No Known Allergies        Current Outpatient Medications:   •  albuterol sulfate  (90 Base) MCG/ACT inhaler, INHALE TWO PUFFS BY MOUTH EVERY 4 HOURS AS NEEDED FOR WHEEZING, Disp: 18 g, Rfl: 3  •  aspirin 81 MG tablet, Take 1 tablet by mouth daily., Disp: 90 tablet, Rfl: 3  •  Umeclidinium-Vilanterol (Anoro Ellipta) 62.5-25 MCG/ACT aerosol powder  inhaler, Inhale 1 puff daily as directed for COPD, Disp: 1 each, Rfl: 11  •  cefdinir (OMNICEF) 300 MG capsule, Take 1 p.o. twice daily until gone for bronchitis, Disp: 20 capsule, Rfl: 0  •  HYDROcod Polst-CPM Polst ER (Tussionex Pennkinetic ER) 10-8 MG/5ML ER suspension, Take 1 teaspoon p.o. every 12 hours as needed for cough and congestion, Disp: 90 mL, Rfl: 0  •  predniSONE (DELTASONE) 10 MG tablet, 5 by mouth daily 7 days, then 4 daily 2 days, 3 daily 2 days, 2 daily 2 days, 1 daily 2 days, one half daily 2 days and discontinue., Disp: 56 tablet, Rfl: 0      Family History   Problem Relation Age of Onset   • Anuerysm Brother         Cerebral Artery Aneurysm. Brother  of a brain aneurysm at age 52.   • Hypertension Other         Benign Essential   • Heart disease Other    • Malig Hyperthermia Neg Hx          Social History     Socioeconomic History   • Marital status:    • Number of children: 2   • Highest education level: 8th grade   Tobacco Use   • Smoking status: Every Day     Packs/day: 2.00     Types: Cigarettes   • Smokeless tobacco: Never   Vaping Use   • Vaping Use: Never used   Substance and Sexual Activity   • Alcohol use: Yes     Comment: 6 BEERS A DAY    • Drug use: No   • Sexual activity: Yes      "Partners: Female         Vitals:    12/27/22 1234   BP: 122/64   Pulse: 84   Resp: 18   SpO2: 94%   Weight: 72.6 kg (160 lb)   Height: 180.3 cm (71\")        Body mass index is 22.32 kg/m².      Physical Exam:    General: Alert and oriented x 3.  No acute distress.  Normal affect.  HEENT: Pupils equal, round, reactive to light; extraocular movements intact; sclerae nonicteric; pharynx, ear canals and TMs normal.  Neck: Without JVD, thyromegaly, bruit, or adenopathy.  Lungs: Bilateral scattered rhonchi and wheezes.  No definite consolidation noted.  Heart: Regular rate and rhythm without murmur, rub, gallop, or click.  Abdomen: Soft, nontender, without hepatosplenomegaly or hernia.  Bowel sounds normal.  : Deferred.  Rectal: Deferred.  Extremities: Without clubbing, cyanosis, edema, or pulse deficit.  Neurologic: Intact without focal deficit.  Normal station and gait observed during ingress and egress from the examination room.  Skin: Without significant lesion.  Musculoskeletal: Unremarkable.    Lab/other results:      Assessment/Plan:     Diagnosis Plan   1. COPD with exacerbation (HCC)  cefdinir (OMNICEF) 300 MG capsule    predniSONE (DELTASONE) 10 MG tablet    HYDROcod Polst-CPM Polst ER (Tussionex Pennkinetic ER) 10-8 MG/5ML ER suspension      2. Chronic obstructive pulmonary disease (COPD)  Umeclidinium-Vilanterol (Anoro Ellipta) 62.5-25 MCG/ACT aerosol powder  inhaler    cefdinir (OMNICEF) 300 MG capsule    predniSONE (DELTASONE) 10 MG tablet    HYDROcod Polst-CPM Polst ER (Tussionex Pennkinetic ER) 10-8 MG/5ML ER suspension      3. Cigarette nicotine dependence with nicotine-induced disorder          Patient is a cigarette smoker who presents with a history and exam consistent with exacerbation of COPD/bronchitis.  I think aggressive treatment is warranted.  We did discuss the need to stop smoking but formal smoking cessation not performed given his acute illness.  Patient understands smoking is causing this " problem but he just cannot seem to stop.    Plan is as follows: Cefdinir 300 mg p.o. twice daily x10 days, prednisone 50 mg p.o. daily x7 days, taper and discontinue, Tussionex 1 teaspoon every 12 hours as needed for congestion and cough.  Patient should continue with the Anoro inhaler and the albuterol only when needed.  He should contact me if his symptoms do not resolve with treatment and certainly if they worsen, particularly high fevers or severe shortness of breath.          Procedures

## 2024-02-29 NOTE — TELEPHONE ENCOUNTER
Spoke w pt wife and informed of new date   Pts wife chose 4/4 for surgery  Pre/post appts rescheduled

## 2024-03-07 ENCOUNTER — OFFICE VISIT (OUTPATIENT)
Dept: CARDIOLOGY | Facility: CLINIC | Age: 84
End: 2024-03-07
Payer: MEDICARE

## 2024-03-07 ENCOUNTER — DOCUMENT SCAN (OUTPATIENT)
Dept: HOME HEALTH SERVICES | Facility: HOSPITAL | Age: 84
End: 2024-03-07
Payer: MEDICARE

## 2024-03-07 VITALS
BODY MASS INDEX: 27.3 KG/M2 | HEART RATE: 68 BPM | SYSTOLIC BLOOD PRESSURE: 122 MMHG | DIASTOLIC BLOOD PRESSURE: 64 MMHG | OXYGEN SATURATION: 95 % | WEIGHT: 173.94 LBS | HEIGHT: 67 IN

## 2024-03-07 DIAGNOSIS — I10 PRIMARY HYPERTENSION: ICD-10-CM

## 2024-03-07 DIAGNOSIS — Z01.818 PRE-OP EVALUATION: Primary | ICD-10-CM

## 2024-03-07 DIAGNOSIS — I44.0 FIRST DEGREE AV BLOCK: ICD-10-CM

## 2024-03-07 DIAGNOSIS — E78.2 MIXED HYPERLIPIDEMIA: ICD-10-CM

## 2024-03-07 PROCEDURE — 1126F AMNT PAIN NOTED NONE PRSNT: CPT | Mod: CPTII,S$GLB,, | Performed by: STUDENT IN AN ORGANIZED HEALTH CARE EDUCATION/TRAINING PROGRAM

## 2024-03-07 PROCEDURE — 93010 ELECTROCARDIOGRAM REPORT: CPT | Mod: S$GLB,,, | Performed by: GENERAL PRACTICE

## 2024-03-07 PROCEDURE — 99204 OFFICE O/P NEW MOD 45 MIN: CPT | Mod: S$GLB,,, | Performed by: STUDENT IN AN ORGANIZED HEALTH CARE EDUCATION/TRAINING PROGRAM

## 2024-03-07 PROCEDURE — 3074F SYST BP LT 130 MM HG: CPT | Mod: CPTII,S$GLB,, | Performed by: STUDENT IN AN ORGANIZED HEALTH CARE EDUCATION/TRAINING PROGRAM

## 2024-03-07 PROCEDURE — 3288F FALL RISK ASSESSMENT DOCD: CPT | Mod: CPTII,S$GLB,, | Performed by: STUDENT IN AN ORGANIZED HEALTH CARE EDUCATION/TRAINING PROGRAM

## 2024-03-07 PROCEDURE — 1100F PTFALLS ASSESS-DOCD GE2>/YR: CPT | Mod: CPTII,S$GLB,, | Performed by: STUDENT IN AN ORGANIZED HEALTH CARE EDUCATION/TRAINING PROGRAM

## 2024-03-07 PROCEDURE — 99999 PR PBB SHADOW E&M-EST. PATIENT-LVL IV: CPT | Mod: PBBFAC,,, | Performed by: STUDENT IN AN ORGANIZED HEALTH CARE EDUCATION/TRAINING PROGRAM

## 2024-03-07 PROCEDURE — 93005 ELECTROCARDIOGRAM TRACING: CPT | Mod: S$GLB,,, | Performed by: STUDENT IN AN ORGANIZED HEALTH CARE EDUCATION/TRAINING PROGRAM

## 2024-03-07 PROCEDURE — 3078F DIAST BP <80 MM HG: CPT | Mod: CPTII,S$GLB,, | Performed by: STUDENT IN AN ORGANIZED HEALTH CARE EDUCATION/TRAINING PROGRAM

## 2024-03-07 RX ORDER — BUSPIRONE HYDROCHLORIDE 7.5 MG/1
TABLET ORAL
COMMUNITY
Start: 2024-02-26

## 2024-03-07 NOTE — PROGRESS NOTES
Patient ID:  Giovanni Zavaleta Sr.  83 y.o.  male      Assessment:       1. Pre-op evaluation    2. First degree AV block    3. Primary hypertension    4. Mixed hyperlipidemia          Plan:     Patient reports no angina or anginal equivalent symptom at rest or with exertion.  EKG does not show any significant ischemic changes.  He has first-degree AV block but no high-grade blocks.  He denies any dizziness, syncope or near-syncope.  Has one isolated PVC on EKG.  He is already on a beta-blocker. No murmurs heard on physical exam. Blood pressure is adequately controlled.  There is no major cardiac contraindications at this time for noncardiac procedure.  He is at moderate risk for perioperative cardiac adverse events.  Continue labetalol and Crestor.  Can hold Plavix prior to his procedure from cardiac standpoint however he is not on it because of cardiac reasons, he is on it for his PAD. Will defer to vascular if further clarification needed in terms of Plavix.      Subjective:     Chief Complaint   Patient presents with    Hypertension     New patient is here for Surgery clearance with Dr Toledo .        HPI:  Giovanni Zavaleta Sr. is a 83 y.o. male who presents for preop evaluation.  He is history of hypertension, hyperlipidemia, CKD.  He also has history of BPH with incomplete bladder emptying and recurrent UTI he has been experiencing recurrent UTI for past several months despite being on long-term antibiotic therapy.  Urologist Dr. Toledo.  He is scheduled to undergo UroLift procedure.  He has some dementia as well.  His his wife is here with him today.  He has coronary artery disease listed in his problem list but him and his wife both deny any history of such.  Does have peripheral arterial disease and has had bypass for that.  He has had carotid endarterectomy as well. They deny any history of him having CABG or any coronary stents or angioplasty in the past.    He reports no chest pain, dyspnea, orthopnea,  PND, swelling extremities, palpitations, syncope or near syncope.  He has some balance issues and sometimes has to use a walker.  He is able to walk around his house with no angina or anginal equivalent symptoms.  Home care also helps him with some exercises at home.  He walks around his driveway back and forth to stay active.  Denies any exertional angina or anginal equivalent symptoms.  Denies any claudications.    EKG today shows sinus rhythm, first-degree AV block, non-specific IVCD, normal QT, no significant ST-T abnormalities and one isolated PVC      Most Recent EKG Results  No results found for this or any previous visit.    The ASCVD Risk score (Coyote DK, et al., 2019) failed to calculate for the following reasons:    The 2019 ASCVD risk score is only valid for ages 40 to 79    Review of patient's allergies indicates:   Allergen Reactions    Pravastatin Rash       Past Medical History:   Diagnosis Date    CKD (chronic kidney disease) stage 3, GFR 30-59 ml/min     Diverticulosis     DJD (degenerative joint disease)     HLD (hyperlipidemia)     Hypertension     Macular degeneration     PVD (peripheral vascular disease)      Past Surgical History:   Procedure Laterality Date    ARTERIAL BYPASS SURGRY  2009,2010,2012    Dr Abhishek Lantigua; Peripheral arteries    CAROTID ENDARTERECTOMY Bilateral      5 and 6 years ago.    CHOLECYSTECTOMY      COLON SURGERY  2012    10 inches removed     CYSTOSCOPY N/A 2/6/2024    Procedure: CYSTOSCOPY;  Surgeon: Itz Toledo MD;  Location: [facility];  Service: Urology;  Laterality: N/A;    TONSILLECTOMY      at 17 y/o     TRANSRECTAL ULTRASOUND EXAMINATION N/A 2/6/2024    Procedure: ULTRASOUND, RECTAL APPROACH;  Surgeon: Itz Toledo MD;  Location: [facility] OR;  Service: Urology;  Laterality: N/A;     Social History     Tobacco Use    Smoking status: Former     Current packs/day: 0.00     Types: Cigarettes     Start date: 1/1/1960     Quit date: 8/1/2008     Years  since quitting: 15.6    Smokeless tobacco: Never   Substance Use Topics    Alcohol use: Not Currently    Drug use: Never          REVIEW OF SYSTEMS  CONSTITUTIONAL: No chills, no fatigue, no fever.   EYES: No double vision, no blurred vision  NEURO: No headaches, no dizziness  RESPIRATORY: No cough, no wheezing.    CARDIOVASCULAR: See HPI   GI: No abdominal pain, no melena, no diarrhea, no nausea or vomiting.   : No  dysuria and frequency, no hematuria  SKIN: no bruising, no discoloration  ENDOCRINE: no polyphagia, no heat intolerance, no cold intolerance  PSYCHIATRIC: no depression, no anxiety, no memory loss  MUSCULOSKELETAL: no  neck pain, no muscle weakness,no back pain          Objective:        Vitals:    03/07/24 1104   BP: 122/64   Pulse: 68       PHYSICAL EXAM  CONSTITUTIONAL: In no apparent distress  HEENT: Normocephalic. Pupils normal and conjunctivae normal. No pallor  NECK: No JVD  LUNGS: B/L air entry to the lungs, clear to auscultation. No rales, wheezing or rhonchi.  HEART: Normal rate and regular rhythm. Normal S1 and S2.  No murmur   ABDOMEN: soft, non-tender; bowel sounds normal  EXTREMITIES: No edema. Good palpable distal pulses.  NEURO: AAO X 3, no gross sensory or motor deficits  SKIN:  No rash  Psych:  Normal affect    Lab Results   Component Value Date    WBC 8.00 07/25/2023    HGB 14.2 07/25/2023    HCT 46.1 07/25/2023     07/25/2023    CHOL 136 07/25/2023    TRIG 79 07/25/2023    HDL 44 07/25/2023    ALT 11 07/25/2023    AST 12 07/25/2023     (H) 07/25/2023    K 4.4 07/25/2023     07/25/2023    CREATININE 1.1 01/27/2024    BUN 17 07/25/2023    CO2 26 07/25/2023    TSH 3.094 07/25/2023    HGBA1C 5.6 07/25/2023        @  Lab Results   Component Value Date    CHOL 136 07/25/2023    CHOL 130 07/26/2022    CHOL 122 02/24/2021     Lab Results   Component Value Date    HDL 44 07/25/2023    HDL 37 (L) 07/26/2022    HDL 44 02/24/2021     Lab Results   Component Value Date     "LDLCALC 76.2 07/25/2023    LDLCALC 72.8 07/26/2022    LDLCALC 61 02/24/2021     No results found for: "DLDL"  Lab Results   Component Value Date    TRIG 79 07/25/2023    TRIG 101 07/26/2022    TRIG 91 02/24/2021       f1   Lab Results   Component Value Date    CHOLHDL 32.4 07/25/2023    CHOLHDL 28.5 07/26/2022    CHOLHDL 2.8 02/24/2021            Current Outpatient Medications   Medication Instructions    ascorbic acid (vitamin C) (VITAMIN C) 100 mg, Oral, Daily    busPIRone (BUSPAR) 7.5 MG tablet Once a day for right now .    cefUROXime (CEFTIN) 250 mg, Oral, Daily    ciclopirox (PENLAC) 8 % Soln Apply over nail and surrounding skin. Apply daily over previous coat. After seven (7) days, may remove with alcohol and continue cycle.    clopidogreL (PLAVIX) 75 mg, Oral, Daily    clotrimazole-betamethasone 1-0.05% (LOTRISONE) cream Topical (Top), 2 times daily, To groin creases x3-4 weeks max or until resolution    coenzyme Q10 100 mg, Oral, Daily    fenofibrate micronized (LOFIBRA) 134 mg, Oral, With breakfast    finasteride (PROSCAR) 5 mg, Oral    fluticasone propionate (FLONASE) 100 mcg, Each Nostril, Daily    hydrocortisone 1 % lotion Topical (Top), 2 times daily    ketorolac 0.5% (ACULAR) 0.5 % Drop INSTILL 1 DROP INTO BOTH EYES 4 TIMES A DAY    labetaloL (NORMODYNE) 200 MG tablet TAKE 1 TABLET EVERY DAY    LORazepam (ATIVAN) 1-2 mg, Oral    melatonin 3 mg Cap 1 capsule, Oral, Nightly    rosuvastatin (CRESTOR) 10 MG tablet TAKE 1 TABLET EVERY EVENING    tamsulosin (FLOMAX) 0.8 mg, Oral, Daily, Take in evening.    triamcinolone acetonide 0.1% (KENALOG) 0.1 % cream Topical (Top), 2 times daily    vit A,C & E-lutein-minerals 1,000-60-2 unit (OCUVITE WITH LUTEIN) Tab 1 tablet, Oral, Daily    vitamin D (VITAMIN D3) 1000 units Tab Oral         All pertinent labs, imaging, and EKGs reviewed.  Patient's most recent EKG tracing was personally interpreted by this provider.        Follow up in about 3 months (around " 6/7/2024).

## 2024-03-07 NOTE — LETTER
2024    Giovanni Zavaleta Sr.  [address]  [place] [address]             [facility]  [address]  [address]  [place] [address]  Phone: [phone]  Fax: [phone] Patient: Giovanni Zavaleta Sr.  : 1940  Referring Doctor:Dr Toledo   Procedure : CYSTOSCOPY, WITH INSERTION OF UROLIFT IMPLANT     Date of Last Office Visit: 2024    Current Outpatient Medications   Medication Sig    ascorbic acid, vitamin C, (VITAMIN C) 100 MG tablet Take 100 mg by mouth once daily.    busPIRone (BUSPAR) 7.5 MG tablet Once a day for right now .    cefUROXime (CEFTIN) 250 MG tablet Take 1 tablet (250 mg total) by mouth once daily.    ciclopirox (PENLAC) 8 % Soln Apply over nail and surrounding skin. Apply daily over previous coat. After seven (7) days, may remove with alcohol and continue cycle.    clopidogreL (PLAVIX) 75 mg tablet Take 75 mg by mouth once daily.    clotrimazole-betamethasone 1-0.05% (LOTRISONE) cream Apply topically 2 (two) times daily. To groin creases x3-4 weeks max or until resolution    coenzyme Q10 100 mg capsule Take 100 mg by mouth once daily.    fenofibrate micronized (LOFIBRA) 134 MG Cap Take 1 capsule (134 mg total) by mouth daily with breakfast.    finasteride (PROSCAR) 5 mg tablet TAKE 1 TABLET EVERY DAY    fluticasone propionate (FLONASE) 50 mcg/actuation nasal spray 2 sprays (100 mcg total) by Each Nostril route once daily at 6am.    hydrocortisone 1 % lotion Apply topically 2 (two) times daily.    ketorolac 0.5% (ACULAR) 0.5 % Drop INSTILL 1 DROP INTO BOTH EYES 4 TIMES A DAY    labetaloL (NORMODYNE) 200 MG tablet TAKE 1 TABLET EVERY DAY    melatonin 3 mg Cap Take 1 capsule by mouth every evening.    rosuvastatin (CRESTOR) 10 MG tablet TAKE 1 TABLET EVERY EVENING    tamsulosin (FLOMAX) 0.4 mg Cap Take 2 capsules (0.8 mg total) by mouth once daily. Take in evening.    triamcinolone acetonide 0.1% (KENALOG)  0.1 % cream Apply topically 2 (two) times daily.    vit A,C & E-lutein-minerals 1,000-60-2 unit (OCUVITE WITH LUTEIN) Tab Take 1 tablet by mouth once daily.    vitamin D (VITAMIN D3) 1000 units Tab Take by mouth.    LORazepam (ATIVAN) 1 MG tablet Take 1-2 mg by mouth.     No current facility-administered medications for this visit.       This patient has been assessed for stephany-operative cardiac risk evaluation.    There is no major cardiac contraindications at this time for noncardiac procedure.  He is at moderate risk for perioperative cardiac adverse events.  Can hold Plavix prior to his procedure from cardiac standpoint however he is not on it because of cardiac reasons, he is on it for his PAD. Will defer to vascular if further clarification needed in terms of Plavix.    If you have any questions regarding the above, please contact my office at [phone].    Sincerely,

## 2024-03-17 ENCOUNTER — TELEPHONE (OUTPATIENT)
Dept: UROLOGY | Facility: CLINIC | Age: 84
End: 2024-03-17
Payer: MEDICARE

## 2024-03-18 NOTE — TELEPHONE ENCOUNTER
Cards clearance letter received noting  Can hold Plavix prior to his procedure from cardiac standpoint however he is not on it because of cardiac reasons, he is on it for his PAD. Will defer to vascular if further clarification needed in terms of Plavix     Before cards eval, previously noted to have Dr Botello review/clear to hold bloodthinners to proceed.     Per 2/6 op note  Has been followed by Dr. Botello for peripheral vascular disease including bilateral carotid stenosis and CEA, and is chronically anticoagulated with Plavix and will need clearance to hold this one-week prior. Will cc for clearance request     --> pls fu with Dr Villalta office on clearance request to hold plavix perioperatively  - if not receieved can send 2/6 op note, my last clinic note, and recent cards eval AND then pls fu with call to office  (He has PAT 3/25 so goal to resolve prio)

## 2024-03-18 NOTE — TELEPHONE ENCOUNTER
Spoke w Dr Botellos office have not received clearance but will refaed   Pt has appt to see Dr Botello on 3/28

## 2024-03-20 NOTE — TELEPHONE ENCOUNTER
This is 1 week prior and would be adequate time to hold bloodthinners if cleared.   Pls fu with Dr Villalta office on 3/29

## 2024-03-25 ENCOUNTER — HOSPITAL ENCOUNTER (OUTPATIENT)
Dept: PREADMISSION TESTING | Facility: HOSPITAL | Age: 84
Discharge: HOME | End: 2024-03-25
Attending: UROLOGY
Payer: MEDICARE

## 2024-03-25 DIAGNOSIS — Z01.818 PREOP TESTING: Primary | ICD-10-CM

## 2024-03-25 LAB
APTT PPP: 27.6 SEC (ref 21–32)
INR PPP: 1 (ref 0.8–1.2)
PROTHROMBIN TIME: 11 SEC (ref 9–12.5)

## 2024-03-25 PROCEDURE — 85730 THROMBOPLASTIN TIME PARTIAL: CPT | Performed by: ANESTHESIOLOGY

## 2024-03-25 PROCEDURE — 36415 COLL VENOUS BLD VENIPUNCTURE: CPT | Performed by: ANESTHESIOLOGY

## 2024-03-25 PROCEDURE — 85610 PROTHROMBIN TIME: CPT | Performed by: ANESTHESIOLOGY

## 2024-03-25 NOTE — DISCHARGE INSTRUCTIONS
To confirm, Your doctor has instructed you that surgery is scheduled for: 4/4/24    Please report to [facility], Registration the morning of surgery. You must check-in and receive a wristband before going to your procedure.  [address] [place] [address] [place] [address]    Pre-Op will call the afternoon prior to surgery between 1:00 and 6:00 PM with the final arrival time.  Phone number: [phone]    PLEASE NOTE:  The surgery schedule has many variables which may affect the time of your surgery case.  Family members should be available if your surgery time changes.  Plan to be here the day of your procedure between 4-6 hours.    MEDICATIONS:  TAKE ONLY THESE MEDICATIONS WITH A SMALL SIP OF WATER THE MORNING OF YOUR PROCEDURE:    SEE MED LIST          DO NOT TAKE THESE MEDICATIONS 5-7 DAYS PRIOR to your procedure or per your surgeon's request:   ASPIRIN, ALEVE, ADVIL, IBUPROFEN, FISH OIL VITAMIN E, HERBALS  (May take Tylenol)    ONLY if you are prescribed any types of blood thinners such as:  Aspirin, Coumadin, Plavix, Pradaxa, Xarelto, Aggrenox, Effient, Eliquis, Savasya, Brilinta, or any other, ask your surgeon whether you should stop taking them and how long before surgery you should stop.  You may also need to verify with the prescribing physician if it is ok to stop your medication.      INSTRUCTIONS IMPORTANT!!  Do not eat or drink anything between midnight and the time of your procedure- this includes gum, mints, and candy.  Do not smoke or drink alcoholic beverages 24 hours prior to your procedure.  Shower the night before AND the morning of your procedure with a Chlorhexidine wash such as Hibiclens or Dial antibacterial soap from the neck down.  Do not get it on your face or in your eyes.  You may use your own shampoo and face wash. This helps your skin to be as bacteria free as possible.    If you wear contact lenses, dentures, hearing aids or glasses, bring a container to put them in  during surgery and give to a family member for safe keeping.  Please leave all jewelry, piercing's and valuables at home. You must remove your false eyelashes prior to surgery.    DO NOT remove hair from the surgery site.  Do not shave the incision site unless you are given specific instructions to do so.    ONLY if you have been diagnosed with sleep apnea please bring your C-PAP machine.  ONLY if you wear home oxygen please bring your portable oxygen tank the day of your procedure.  ONLY if you have a history of OPEN HEART SURGERY you will need a clearance from your Cardiologist per Anesthesia.      ONLY for patients requiring bowel prep, written instructions will be given by your doctor's office.  ONLY if you have a neuro stimulator, please bring the controller with you the morning of surgery  ONLY if a type and screen test is needed before surgery, please return:  If your doctor has scheduled you for an overnight stay, bring a small overnight bag with any personal items you need.  Make arrangements in advance for transportation home by a responsible adult. You can not go home in an uber or a cab per hospital policy.  It is not safe to drive a vehicle during the 24 hours after anesthesia.          All  facilities and properties are tobacco free.  Smoking is NOT allowed.   If you have any questions about these instructions, call Pre-Op Admit  Nursing at [phone] or the Pre-Op Day Surgery Unit at [phone].

## 2024-04-01 ENCOUNTER — TELEPHONE (OUTPATIENT)
Dept: UROLOGY | Facility: CLINIC | Age: 84
End: 2024-04-01
Payer: MEDICARE

## 2024-04-01 NOTE — TELEPHONE ENCOUNTER
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

## 2024-04-01 NOTE — TELEPHONE ENCOUNTER
Preop reached out directly to me re early case request and advised I would put him in easlry to mid morning hours no problem    Macular degen injection shouldn't interfere but should discuss with optho    Vascular clearance to hold plavix 1 week noted/received, seen by dr paz 1 week ago - so please verify has been holding bloodthinners

## 2024-04-01 NOTE — TELEPHONE ENCOUNTER
Spoke w pts wife she called with concern for upcoming procedure and would like to request for early procedure as pt demetia gets real ba and agitated when he doesn't eat   Pt has macular degeneration inject coming up and would like to know will that interfer?

## 2024-04-01 NOTE — TELEPHONE ENCOUNTER
----- Message from Anton Georgi sent at 4/1/2024  9:12 AM CDT -----  Regarding: Pt Advice  Contact: pt wife  Needs Medical Advice      Who Called: Jorgito Zavaleta         Would the patient rather a call back or a response via [facility]? Call back    Best Call Back Number:  [phone]      Additional Information: Sts is having a procedure this Thursday and is wanting a call from the office to see if he can get in as early as possible that day. Sts the pt has dementia and if he would not eat with get severely agitated etc. Sts the pt will also be getting an injection on Wednesday and wanting to make sure that it will not conflict with the medications the pt will have to take for the procedure.    Please Advise - Thank you

## 2024-04-01 NOTE — TELEPHONE ENCOUNTER
Spoke w pts wife and informed of all md recs   With pts wife questions ansered   Pts wife did confirm pt has been off of blood thinner

## 2024-04-02 ENCOUNTER — EXTERNAL HOME HEALTH (OUTPATIENT)
Dept: HOME HEALTH SERVICES | Facility: HOSPITAL | Age: 84
End: 2024-04-02
Payer: MEDICARE

## 2024-04-03 ENCOUNTER — ANESTHESIA EVENT (OUTPATIENT)
Dept: SURGERY | Facility: HOSPITAL | Age: 84
End: 2024-04-03
Payer: MEDICARE

## 2024-04-04 ENCOUNTER — HOSPITAL ENCOUNTER (OUTPATIENT)
Facility: HOSPITAL | Age: 84
Discharge: HOME | End: 2024-04-04
Attending: UROLOGY | Admitting: UROLOGY
Payer: MEDICARE

## 2024-04-04 ENCOUNTER — ANESTHESIA (OUTPATIENT)
Dept: SURGERY | Facility: HOSPITAL | Age: 84
End: 2024-04-04
Payer: MEDICARE

## 2024-04-04 VITALS
OXYGEN SATURATION: 95 % | SYSTOLIC BLOOD PRESSURE: 163 MMHG | HEART RATE: 65 BPM | BODY MASS INDEX: 27.15 KG/M2 | DIASTOLIC BLOOD PRESSURE: 67 MMHG | WEIGHT: 173 LBS | HEIGHT: 67 IN | RESPIRATION RATE: 20 BRPM | TEMPERATURE: 98 F

## 2024-04-04 DIAGNOSIS — R39.14 BENIGN PROSTATIC HYPERPLASIA WITH INCOMPLETE BLADDER EMPTYING: ICD-10-CM

## 2024-04-04 DIAGNOSIS — N40.1 BENIGN PROSTATIC HYPERPLASIA WITH INCOMPLETE BLADDER EMPTYING: ICD-10-CM

## 2024-04-04 PROCEDURE — L8699 PROSTHETIC IMPLANT NOS: HCPCS | Performed by: UROLOGY

## 2024-04-04 PROCEDURE — 36000706: Performed by: UROLOGY

## 2024-04-04 PROCEDURE — 25000003 PHARM REV CODE 250: Performed by: UROLOGY

## 2024-04-04 PROCEDURE — 25000003 PHARM REV CODE 250: Performed by: NURSE ANESTHETIST, CERTIFIED REGISTERED

## 2024-04-04 PROCEDURE — 37000008 HC ANESTHESIA 1ST 15 MINUTES: Performed by: UROLOGY

## 2024-04-04 PROCEDURE — 36000707: Performed by: UROLOGY

## 2024-04-04 PROCEDURE — 71000015 HC POSTOP RECOV 1ST HR: Performed by: UROLOGY

## 2024-04-04 PROCEDURE — 25000003 PHARM REV CODE 250: Performed by: ANESTHESIOLOGY

## 2024-04-04 PROCEDURE — D9220A PRA ANESTHESIA: Mod: CRNA,,, | Performed by: NURSE ANESTHETIST, CERTIFIED REGISTERED

## 2024-04-04 PROCEDURE — 37000009 HC ANESTHESIA EA ADD 15 MINS: Performed by: UROLOGY

## 2024-04-04 PROCEDURE — 71000016 HC POSTOP RECOV ADDL HR: Performed by: UROLOGY

## 2024-04-04 PROCEDURE — 63600175 PHARM REV CODE 636 W HCPCS: Performed by: UROLOGY

## 2024-04-04 PROCEDURE — D9220A PRA ANESTHESIA: Mod: ANES,,, | Performed by: ANESTHESIOLOGY

## 2024-04-04 PROCEDURE — 27200651 HC AIRWAY, LMA: Performed by: NURSE ANESTHETIST, CERTIFIED REGISTERED

## 2024-04-04 PROCEDURE — 94799 UNLISTED PULMONARY SVC/PX: CPT | Mod: XB

## 2024-04-04 PROCEDURE — 71000039 HC RECOVERY, EACH ADD'L HOUR: Performed by: UROLOGY

## 2024-04-04 PROCEDURE — 63600175 PHARM REV CODE 636 W HCPCS: Performed by: NURSE ANESTHETIST, CERTIFIED REGISTERED

## 2024-04-04 PROCEDURE — 52441 CYSTO INSJ TRNSPRSTC 1 IMPLT: CPT | Mod: ,,, | Performed by: UROLOGY

## 2024-04-04 PROCEDURE — 27000221 HC OXYGEN, UP TO 24 HOURS

## 2024-04-04 PROCEDURE — 71000033 HC RECOVERY, INTIAL HOUR: Performed by: UROLOGY

## 2024-04-04 PROCEDURE — 52442 CYSTO INS TRNSPRSTC IMPLT EA: CPT | Mod: ,,, | Performed by: UROLOGY

## 2024-04-04 DEVICE — CARTRIDGE UROLIFT 2 IMPLANT: Type: IMPLANTABLE DEVICE | Site: PROSTATE | Status: FUNCTIONAL

## 2024-04-04 DEVICE — KIT UROLIFT 2 CARTRIDGE HANDLE: Type: IMPLANTABLE DEVICE | Site: PROSTATE | Status: FUNCTIONAL

## 2024-04-04 RX ORDER — ACETAMINOPHEN 10 MG/ML
INJECTION, SOLUTION INTRAVENOUS
Status: DISCONTINUED | OUTPATIENT
Start: 2024-04-04 | End: 2024-04-04

## 2024-04-04 RX ORDER — SODIUM CHLORIDE, SODIUM LACTATE, POTASSIUM CHLORIDE, CALCIUM CHLORIDE 600; 310; 30; 20 MG/100ML; MG/100ML; MG/100ML; MG/100ML
INJECTION, SOLUTION INTRAVENOUS CONTINUOUS
Status: DISCONTINUED | OUTPATIENT
Start: 2024-04-04 | End: 2024-04-04 | Stop reason: HOSPADM

## 2024-04-04 RX ORDER — MEPERIDINE HYDROCHLORIDE 50 MG/ML
12.5 INJECTION INTRAMUSCULAR; INTRAVENOUS; SUBCUTANEOUS ONCE
Status: DISCONTINUED | OUTPATIENT
Start: 2024-04-04 | End: 2024-04-04 | Stop reason: HOSPADM

## 2024-04-04 RX ORDER — HYDROMORPHONE HYDROCHLORIDE 2 MG/ML
0.2 INJECTION, SOLUTION INTRAMUSCULAR; INTRAVENOUS; SUBCUTANEOUS EVERY 5 MIN PRN
Status: DISCONTINUED | OUTPATIENT
Start: 2024-04-04 | End: 2024-04-04 | Stop reason: HOSPADM

## 2024-04-04 RX ORDER — PROPOFOL 10 MG/ML
VIAL (ML) INTRAVENOUS
Status: DISCONTINUED | OUTPATIENT
Start: 2024-04-04 | End: 2024-04-04

## 2024-04-04 RX ORDER — PHENAZOPYRIDINE HYDROCHLORIDE 200 MG/1
200 TABLET, FILM COATED ORAL 3 TIMES DAILY PRN
Qty: 15 TABLET | Refills: 0 | Status: SHIPPED | OUTPATIENT
Start: 2024-04-04 | End: 2024-05-01

## 2024-04-04 RX ORDER — DIPHENHYDRAMINE HYDROCHLORIDE 50 MG/ML
25 INJECTION INTRAMUSCULAR; INTRAVENOUS EVERY 6 HOURS PRN
Status: DISCONTINUED | OUTPATIENT
Start: 2024-04-04 | End: 2024-04-04 | Stop reason: HOSPADM

## 2024-04-04 RX ORDER — EPHEDRINE SULFATE 50 MG/ML
INJECTION, SOLUTION INTRAVENOUS
Status: DISCONTINUED | OUTPATIENT
Start: 2024-04-04 | End: 2024-04-04

## 2024-04-04 RX ORDER — OXYCODONE HYDROCHLORIDE 5 MG/1
5 TABLET ORAL
Status: DISCONTINUED | OUTPATIENT
Start: 2024-04-04 | End: 2024-04-04 | Stop reason: HOSPADM

## 2024-04-04 RX ORDER — PROCHLORPERAZINE EDISYLATE 5 MG/ML
5 INJECTION INTRAMUSCULAR; INTRAVENOUS EVERY 4 HOURS PRN
Status: DISCONTINUED | OUTPATIENT
Start: 2024-04-04 | End: 2024-04-04 | Stop reason: HOSPADM

## 2024-04-04 RX ORDER — LIDOCAINE HYDROCHLORIDE 20 MG/ML
INJECTION INTRAVENOUS
Status: DISCONTINUED | OUTPATIENT
Start: 2024-04-04 | End: 2024-04-04

## 2024-04-04 RX ORDER — LIDOCAINE HYDROCHLORIDE 10 MG/ML
0.5 INJECTION, SOLUTION EPIDURAL; INFILTRATION; INTRACAUDAL; PERINEURAL ONCE
Status: DISCONTINUED | OUTPATIENT
Start: 2024-04-04 | End: 2024-04-04 | Stop reason: HOSPADM

## 2024-04-04 RX ORDER — DEXAMETHASONE SODIUM PHOSPHATE 4 MG/ML
INJECTION, SOLUTION INTRA-ARTICULAR; INTRALESIONAL; INTRAMUSCULAR; INTRAVENOUS; SOFT TISSUE
Status: DISCONTINUED | OUTPATIENT
Start: 2024-04-04 | End: 2024-04-04

## 2024-04-04 RX ORDER — FENTANYL CITRATE 50 UG/ML
INJECTION, SOLUTION INTRAMUSCULAR; INTRAVENOUS
Status: DISCONTINUED | OUTPATIENT
Start: 2024-04-04 | End: 2024-04-04

## 2024-04-04 RX ORDER — ONDANSETRON HYDROCHLORIDE 2 MG/ML
4 INJECTION, SOLUTION INTRAVENOUS ONCE
Status: DISCONTINUED | OUTPATIENT
Start: 2024-04-04 | End: 2024-04-04 | Stop reason: HOSPADM

## 2024-04-04 RX ORDER — FENTANYL CITRATE 50 UG/ML
25 INJECTION, SOLUTION INTRAMUSCULAR; INTRAVENOUS EVERY 5 MIN PRN
Status: DISCONTINUED | OUTPATIENT
Start: 2024-04-04 | End: 2024-04-04 | Stop reason: HOSPADM

## 2024-04-04 RX ORDER — ONDANSETRON HYDROCHLORIDE 2 MG/ML
INJECTION, SOLUTION INTRAVENOUS
Status: DISCONTINUED | OUTPATIENT
Start: 2024-04-04 | End: 2024-04-04

## 2024-04-04 RX ADMIN — LIDOCAINE HYDROCHLORIDE 100 MG: 20 INJECTION, SOLUTION INTRAVENOUS at 10:04

## 2024-04-04 RX ADMIN — DEXAMETHASONE SODIUM PHOSPHATE 4 MG: 4 INJECTION, SOLUTION INTRA-ARTICULAR; INTRALESIONAL; INTRAMUSCULAR; INTRAVENOUS; SOFT TISSUE at 10:04

## 2024-04-04 RX ADMIN — EPHEDRINE SULFATE 10 MG: 50 INJECTION, SOLUTION INTRAMUSCULAR; INTRAVENOUS; SUBCUTANEOUS at 11:04

## 2024-04-04 RX ADMIN — ACETAMINOPHEN 1000 MG: 10 INJECTION, SOLUTION INTRAVENOUS at 10:04

## 2024-04-04 RX ADMIN — CEFTRIAXONE 2 G: 2 INJECTION, POWDER, FOR SOLUTION INTRAMUSCULAR; INTRAVENOUS at 10:04

## 2024-04-04 RX ADMIN — ONDANSETRON 8 MG: 2 INJECTION INTRAMUSCULAR; INTRAVENOUS at 10:04

## 2024-04-04 RX ADMIN — FENTANYL CITRATE 25 MCG: 50 INJECTION, SOLUTION INTRAMUSCULAR; INTRAVENOUS at 10:04

## 2024-04-04 RX ADMIN — SODIUM CHLORIDE, SODIUM GLUCONATE, SODIUM ACETATE, POTASSIUM CHLORIDE AND MAGNESIUM CHLORIDE: 526; 502; 368; 37; 30 INJECTION, SOLUTION INTRAVENOUS at 09:04

## 2024-04-04 RX ADMIN — EPHEDRINE SULFATE 5 MG: 50 INJECTION, SOLUTION INTRAMUSCULAR; INTRAVENOUS; SUBCUTANEOUS at 11:04

## 2024-04-04 RX ADMIN — PROPOFOL 140 MG: 10 INJECTION, EMULSION INTRAVENOUS at 10:04

## 2024-04-04 NOTE — PLAN OF CARE
Ready for post op urine draining lt red  cranberry in tube no clots noted vss released from pacu per Anesthesia encouraged drinking lots of water  at home

## 2024-04-04 NOTE — ANESTHESIA POSTPROCEDURE EVALUATION
Anesthesia Post Evaluation    Patient: Giovanni Zavaleta     Procedure(s) Performed: Procedure(s) (LRB):  CYSTOSCOPY, WITH INSERTION OF UROLIFT IMPLANT (N/A)    Final Anesthesia Type: general      Patient location during evaluation: PACU  Patient participation: Yes- Able to Participate  Level of consciousness: awake  Post-procedure vital signs: reviewed and stable  Pain management: adequate  Airway patency: patent    PONV status at discharge: No PONV  Anesthetic complications: no      Cardiovascular status: blood pressure returned to baseline  Respiratory status: unassisted  Hydration status: euvolemic  Follow-up not needed.              Vitals Value Taken Time   /69 04/04/24 1250   Temp 36.6 °C (97.9 °F) 04/04/24 1250   Pulse 63 04/04/24 1250   Resp 19 04/04/24 1250   SpO2 95 % 04/04/24 1250         Event Time   Out of Recovery 12:50:00         Pain/Itz Score: Itz Score: 10 (4/4/2024 12:45 PM)  Modified Itz Score: 20 (4/4/2024 12:50 PM)

## 2024-04-04 NOTE — TRANSFER OF CARE
"Anesthesia Transfer of Care Note    Patient: Giovanni Zavaleta .    Procedure(s) Performed: Procedure(s) (LRB):  CYSTOSCOPY, WITH INSERTION OF UROLIFT IMPLANT (N/A)    Patient location: PACU    Anesthesia Type: general    Transport from OR: Transported from OR on 2-3 L/min O2 by NC with adequate spontaneous ventilation    Post pain: adequate analgesia    Post assessment: no apparent anesthetic complications    Post vital signs: stable    Level of consciousness: sedated    Nausea/Vomiting: no nausea/vomiting    Complications: none    Transfer of care protocol was followed      Last vitals: Visit Vitals  BP (!) 181/76 (BP Location: Right arm, Patient Position: Lying)   Pulse 67   Temp 36.7 °C (98 °F) (Skin)   Resp 18   Ht 5' 7" (1.702 m)   Wt 78.5 kg (173 lb)   SpO2 96%   BMI 27.10 kg/m²     "

## 2024-04-04 NOTE — PLAN OF CARE
Meets criteria for discharge. Discharged to home with wife and son. Denies nausea. Tolerating fluids. Denies pain. Steady gait with assist. Voiding without difficulty via cath. Cath care, night/day bag, and  removal teaching complete. All supplies needed for care and removal given.  Discharge instructions reviewed and printed handout given. Verbalized understanding.

## 2024-04-04 NOTE — PLAN OF CARE
Pre-op complete. Wife and son at bedside. Text notifications initiated. Pt denies need for safe. Belongings with .

## 2024-04-04 NOTE — H&P
[facility] Note  Staff: MATTHEW Philippe     PCP: ANAIS Hebert     Chief Complaint: Hospital F/UP-UTI     Subjective:         Subjective    HPI: Giovanni Zavaleta Sr. is a 83 y.o. male presents today in clinic, accompanied by wife for hospital f/up at this time.     Pt recently has been treated for 2 urinary tract infections with 2 different antibiotics and pt cannot seem to get rid of UTIs.  They feel he still has infection as of office visit today.     Patient has history of dementia and is cared for by wife.      Current  meds:  Finasteride 5 mg one tablet daily in the morning.  Flomax 0.4 mg one capsule daily in the evening.     TODAY-12/29/23:  UA performed in office showed ABNORMAL Findings-Small leuks, positive nitrates.  PVR by bladder scan is 212 mL*  Pt has been on 2 different antibiotics for 2 different UTIs within the last month.  Pt's wife can confirm that pt is taking daily  meds without skipping doses at this time.  No gross hematuria noted by pt or family today.      HX:  The pt is an established pt of Dr. Johnson.  Last OV with me was on 3/16/2023 for f/up regarding rechecking urine for previous UTI.     On 02/22/23 the pt was recently evaluated and treated by Dr. Hebert for +UTI of E. Coli >100,000 at that time.  Pt was prescribed Bactrim DS BID for infection and has now completed all the antibiotics as of today's office visit.     No gross hematuria or dysuria complaints today.  Current  meds:  Finasteride 5 mg in am and Tamsulosin 0.4 mg in pm for BPH with LUTS.     Pt's wife is requesting his PSA level to be checked today due to within the last 6 mos to one year the pt has had an large amount of weight loss.  Last Colonoscopy he states was 2 years ago.     UA performed in office today showed normal findings at this time.  Pt today also c/o Right Testicle pain since infection.      HX:   Pt was last evaluated by me on 9/12/22.  Last ov with MD (10/14/2019) he  finds his lower urinary tract symptoms stable on dual medical therapy with Flomax and finasteride.  He does still have some urgency and frequency though these are both better since last evaluation.  He was unable to afford the Myrbetriq and is on oxybutynin.  We did discuss the concerns about oxybutynin at his age with dry eyes, dry mouth, constipation, changes in mentation and risk of dementia.      Cysto/trus 5/22/18  Prostate volume 35.5cc (W 52.2, H 30.2, L 43.1), no median lobe  Cysto: significant lateral lobe obstruction with kissing lobes centrally, no median lobe, short prostatic urethra, grade 1-2 trabeculations  Dual medical therapy flomax+finasteride, with increase of dose of flomax to 0.8mg, and add myrbetriq for urgency. Continue to decrease coffee intake (he is down to 3-4 cups/day)  - return for uroflow/pvr in 3 months, and consider future Urolift vs. TURP     Did not return for UFS.PVR until 7/11/19 and voided only 60cc with PVR 129cc with 2 distinct curves  He returned 7/18/19 for uroflow voiding 196cc (with pvr only 70cc) with Qm 16.2cc/s, Qa 6.8cc/s, 3 intervals - does have good intial bell curve voiding pattern and then 2nd intermittent low peaking curve to empty  He returns today noting:  AUA SS:  16/4 (4:  Weak stream; 3:  Urgency, sleeping; 2:  Frequency, intermittency; 1:  Emptying, straining).  Postvoid residual by bladder scan is 40 cc  He is not taking Myrbetriq due to cost, and is on Ditropan as per Dr. bernard  He has reduced coffee intake some, as he used to drink it up until going to bed, but now has his last cup of coffee between 530 and 6:00 p.m..  He has decreased hot sauce.  His urgency is a little bit better, and he has increased time between urinations  He does have problems with his CPAP and he is awaiting a new mask.  Poor fit.  With mask can get a good 5 hr of sleep.  The is getting injections for macular degeneration.  Has financial stress related to medications medical  care.  Not interested in further intervention at this time.     OV 5/28/21:  PVR by bladder scan: 57 mL  Nocturia 2x nightly  Overall doing well with no complaints voiced.  Current  meds:  Flomax 0.4 mg daily and Finasteride 5 mg daily and tolerating with no problems.     09/2022:  UA in office today--Pt is unable to urinate during ov today.  C/o skin condition on scrotal sac that causes him intermittent pain and irritation.  Has seen a Dermatologist in the past concerning this issue but never improved.     Pt denies gross hematuria or dysuria at this time.  AUA SS:  28/3 Mixed  Feeling of ICBE:  3  Frequency:3  Intermittency:5  Urgency:5  Weak urine stream:4  Straining:3  Nocturia:5  PVR by bladder scan:  148 mL     REVIEW OF SYSTEMS:  A comprehensive 10 system review was performed and is negative except as noted above in HPI     PMHx:          Past Medical History:   Diagnosis Date    CAD (coronary artery disease)      CKD (chronic kidney disease) stage 3, GFR 30-59 ml/min      Diverticulosis      DJD (degenerative joint disease)      HLD (hyperlipidemia)      Hypertension      Macular degeneration      PVD (peripheral vascular disease)        PSHx:            Past Surgical History:   Procedure Laterality Date    ARTERIAL BYPASS SURGRY   2009,2010,2012     Dr Abhishek Lantigua; Peripheral arteries    CAROTID ENDARTERECTOMY Bilateral        5 and 6 years ago.    CHOLECYSTECTOMY        COLON SURGERY   2012     10 inches removed     TONSILLECTOMY         at 19 y/o       Allergies:  Pravastatin     Medications: reviewed   Objective:   There were no vitals filed for this visit.     General:WDWN in NAD  Eyes: PERRLA, normal conjunctiva  Respiratory: no increased work on breathing, clear to auscultation  Cardiovascular: regular rate and rhythm. No obvious extremity edema.  GI: palpation of masses. No tenderness. No hepatosplenomegaly to palpation.  Musculoskeletal: normal range of motion of bilateral upper extremities.  Normal muscle strength and tone.  Skin: no obvious rashes or lesions. No tightening of skin noted.  Neurologic: CN grossly normal. Normal sensation.   Psychiatric: awake, alert and oriented x 3. Mood and affect normal. Cooperative.        Assessment:         Assessment   1. BPH with urinary obstruction    2. UTI symptoms    3. Incomplete bladder emptying             Plan:   UTI, Incomplete bladder emptying:     Urine Culture to be processed at this time.  Continue Finasteride 5 mg daily in the am.  We will try pt on increasing Flomax to 0.8 mg daily in the afternoon at this time.  I have thoroughly reviewed with pt and family regarding bladder habits vs. Bladder training at home.  Pt and wife verbalized understanding at this time.     F/u--We will contact this pt after we receive and review urine culture results, but plan to f/up with pt in 3-4 weeks to monitor UA and PVR scan at that time.        NP visit(s)reviewed noting recent UTIs ans elevated residuals     Last worked up with cysto/trus in 2018 to plan bph intervention which he declined and preferred fu uroflow after med changes and lost to f/u and has had intermittent fu     Should note that obstruction from prostate AND uti (especially recurrent) IS a guideline indication for BPH intervention, so will need cyto/trus again to update size of prostate and level of obstruction.      Will need to clear infection first  Currently on levaquin through 1/12 and advised to take flomax 0.8 nightlly  Agree  Has f/u with NP for reeval on 1/30  --Keep this appt. Will recheck LUTS, Ua/Ucx, and PVR and can discuss cysto/trus again    -->Given utis should have CTU before appt - book after abx complete with Cr on arrival (week of 1/22)  --> I have booked cysto/trus for 2/6 contingent upon repeat culture at his f/u visit being negative.  - if culture at fu is positive will r/s     CT urogram with no significant urologic concerns  Should discuss non urologic findings with pcp      As per previous  Has f/u with NP for reeval on 1/30  --Keep this appt. Will recheck LUTS, Ua/Ucx, and PVR and can review details of cysto/trus again which has been booked 2/6 as long as his culture at NP visit is negative     2/2  UA done in office today showed trace-intact blood, otherwise normal findings.  PVR is 120 mL*  Pt denies gross hematuria and dysuria at this time.     Ucx neg     Here for cysto/trus  Findings:   Volume 25.36 cm3 (W 46.52mm, H 25.15mm, L 41.44mm), no med lobe, moderate PVR  Cysto:  Significant lateral lobe obstruction proximally and distally, asymmetric right > left, without any significant bladder neck elevation, and no intravesical extension.  When bladder over distended, decent separation of lateral lobes centrally.  Significant diffuse trabeculations with early scattered cellules.    Disposition:  Significant BPH with obstruction.  As well as many changes of longterm obstruction seen at bladder level of which we discuss the contributions to his incomplete emptying and irritative symptoms such as urgency frequency.  We also reviewed that recurrent urinary tract infections in the setting of obstruction is an indication for relief of obstruction.  Certainly his incomplete emptying as a result of the obstruction and obstructive changes is contributing to this.  As he has failed maximal medical therapy, has now had to discontinue OAB agent secondary to his high postvoid residuals, we Discussed options for BPH intervention such as urolift, rezum, tuip, turp. He is interested in proceeding with Urolift after discussion of all risks, benefits, and alternatives.  Especially given minimally invasive nature in the setting of his other comorbidities.     Procedure in detail discussed. Discussed risks including but not limited to hematuria, postop retention, pain, infection, injury to bladder or urethra, and worsening urgency, latent pelvic pain, no guarantee of symptomatic relief, prostate  regrowth, need for future procedures. We did discuss the non-incidence of ejaculatory dysfunction, as well as the 13% recurrence risk at 5 years. Also discussed about 20% of people may need a catheter after (due to retention or hematuria) but not required if voiding postop, though plan to leave alfred prophylactically overnight, which he is agreeable to.     Discussed that symptomatic relief may take longer to be fully appreciated, in the setting of longer standing obstruction, and period of symptomatic adjustment postop. Also discussed transient increases in urgency frequency which may yield transient UUI in postop period as discussed above        All questions patient had answered and appropriate informed consent obtained.

## 2024-04-04 NOTE — DISCHARGE INSTRUCTIONS
Discharge Instructions Following UroLift Transprostatic Implant Treatment    General Expectations  Some men may experience discomfort after the procedure. On occasion, some bloody discharge may be apparent from the penis. You may have soreness in the lower abdomen, and it may be uncomfortable to sit. You may experience the need to urinate more frequently and with greater urgency. These are all normal reactions to the procedure. It is important to take care of yourself the next couple of days to facilitate a speedy recovery. The following are some suggestions:    1. Have someone drive you home after the procedure.   2. Drink plenty of water. If you see blood in your urine, drink more water to keep        Urine flowing.   3. Take your medication as prescribed.   4. If you have a catheter placed, do not engage in strenuous activity until your                  Catheter has been removed. You may take a shower but avoid a bath while       You have a catheter.    5 Ok to return to work when urine is light pink or clear. Avoid strenuous activity       Or heavy lifting for 1 week.    Medications  See discharge medication list provided by [facility] for specific medication. But generally you will be given a pain medication, antibiotic and anti-spasmodic.     When taking pain medications, you may experience dizziness or drowsiness. Do not drink alcohol or drive when you are taking these medications. You can try Tylenol or ibuprofen first and take the prescribed pain medicine if not relieved with this.    You can take ibuprofen 600 mg 3x a day for a max of 3 days as needed. Make sure to eat while taking ibuprofen.    You will be given an antibiotic to prevent urinary tract infection to take twice daily for 3 days, it is important to finish all medications as directed.     You will also be given a medication to help with burning and spasms (Uribel or Pyridium). You do not need to fill this medication unless you experience these  symptoms. If it is too expensive you can buy over the counter Azo to take as needed.     *If you have a catheter placed, see instructions for Care of catheter and instructions for Self removal of catheter or you may be asked to come in to have the catheter removed in the Urology clinic by a nurse.    Complications  You should contact your physician , at [phone]  during working hours or [phone] during after hours (to speak to call urologist) if you experience any of the followin.Temperature above 101.5 (taken by mouth).   2.Excessive urinary bleeding or bleeding from the penis.   3.Continous bladder spasms.   4.Painful, swollen and/or inflated testicles or scrotum.   5. Unable to void spontaneously or the indwelling catheter is not draining urine or       Is blocked.  If you need immediate attention, go to the hospital emergency room for treatment. Always call your physician before going to the emergency room. If your doctor suggests that you go to the emergency room or other facility for catheterization for inability to urinate, be sure to tell the facility personnel to use Coude (pronounced -day) tipped catheter.       Discharge Instructions: After Your Surgery/Procedure  Youve just had surgery. During surgery you were given medicine called anesthesia to keep you relaxed and free of pain. After surgery you may have some pain or nausea. This is common. Here are some tips for feeling better and getting well after surgery.     Stay on schedule with your medication.   Going home  Your doctor or nurse will show you how to take care of yourself when you go home. He or she will also answer your questions. Have an adult family member or friend drive you home.      For your safety we recommend these precaution for the first 24 hours after your procedure:  Do not drive or use heavy equipment.  Do not make important decisions or sign legal papers.  Do not drink alcohol.  Have someone stay with you, if  "needed. He or she can watch for problems and help keep you safe.  Your concentration, balance, coordination, and judgement may be impaired for many hours after anesthesia.  Use caution when ambulating or standing up.     You may feel weak and "washed out" after anesthesia and surgery.      Subtle residual effects of general anesthesia or sedation with regional / local anesthesia can last more than 24 hours.  Rest for the remainder of the day or longer if your Doctor/Surgeon has advised you to do so.  Although you may feel normal within the first 24 hours, your reflexes and mental ability may be impaired without you realizing it.  You may feel dizzy, lightheaded or sleepy for 24 hours or longer.      Be sure to go to all follow-up visits with your doctor. And rest after your surgery for as long as your doctor tells you to.  Coping with pain  If you have pain after surgery, pain medicine will help you feel better. Take it as told, before pain becomes severe. Also, ask your doctor or pharmacist about other ways to control pain. This might be with heat, ice, or relaxation. And follow any other instructions your surgeon or nurse gives you.  Tips for taking pain medicine  To get the best relief possible, remember these points:  Pain medicines can upset your stomach. Taking them with a little food may help.  Most pain relievers taken by mouth need at least 20 to 30 minutes to start to work.  Taking medicine on a schedule can help you remember to take it. Try to time your medicine so that you can take it before starting an activity. This might be before you get dressed, go for a walk, or sit down for dinner.  Constipation is a common side effect of pain medicines. Call your doctor before taking any medicines such as laxatives or stool softeners to help ease constipation. Also ask if you should skip any foods. Drinking lots of fluids and eating foods such as fruits and vegetables that are high in fiber can also help. " Remember, do not take laxatives unless your surgeon has prescribed them.  Drinking alcohol and taking pain medicine can cause dizziness and slow your breathing. It can even be deadly. Do not drink alcohol while taking pain medicine.  Pain medicine can make you react more slowly to things. Do not drive or run machinery while taking pain medicine.  Your health care provider may tell you to take acetaminophen to help ease your pain. Ask him or her how much you are supposed to take each day. Acetaminophen or other pain relievers may interact with your prescription medicines or other over-the-counter (OTC) drugs. Some prescription medicines have acetaminophen and other ingredients. Using both prescription and OTC acetaminophen for pain can cause you to overdose. Read the labels on your OTC medicines with care. This will help you to clearly know the list of ingredients, how much to take, and any warnings. It may also help you not take too much acetaminophen. If you have questions or do not understand the information, ask your pharmacist or health care provider to explain it to you before you take the OTC medicine.  Managing nausea  Some people have an upset stomach after surgery. This is often because of anesthesia, pain, or pain medicine, or the stress of surgery. These tips will help you handle nausea and eat healthy foods as you get better. If you were on a special food plan before surgery, ask your doctor if you should follow it while you get better. These tips may help:  Do not push yourself to eat. Your body will tell you when to eat and how much.  Start off with clear liquids and soup. They are easier to digest.  Next try semi-solid foods, such as mashed potatoes, applesauce, and gelatin, as you feel ready.  Slowly move to solid foods. Dont eat fatty, rich, or spicy foods at first.  Do not force yourself to have 3 large meals a day. Instead eat smaller amounts more often.  Take pain medicines with a small amount  of solid food, such as crackers or toast, to avoid nausea.     Call your surgeon if  You still have pain an hour after taking medicine. The medicine may not be strong enough.  You feel too sleepy, dizzy, or groggy. The medicine may be too strong.  You have side effects like nausea, vomiting, or skin changes, such as rash, itching, or hives.       If you have obstructive sleep apnea  You were given anesthesia medicine during surgery to keep you comfortable and free of pain. After surgery, you may have more apnea spells because of this medicine and other medicines you were given. The spells may last longer than usual.   At home:  Keep using the continuous positive airway pressure (CPAP) device when you sleep. Unless your health care provider tells you not to, use it when you sleep, day or night. CPAP is a common device used to treat obstructive sleep apnea.  Talk with your provider before taking any pain medicine, muscle relaxants, or sedatives. Your provider will tell you about the possible dangers of taking these medicines.  © 8063-3067 [employer]. [address] [place], [place] [address]. All rights reserved. This information is not intended as a substitute for professional medical care. Always follow your healthcare professional's instructions.    Post op instructions for prevention of DVT  What is deep vein thrombosis?  Deep vein thrombosis (DVT) is the medical term for blood clots in the deep veins of the leg.  These blood clots can be dangerous.  A DVT can block a blood vessel and keep blood from getting where it needs to go.  Another problem is that the clot can travel to other parts of the body such as the lungs.  A clot that travels to the lungs is called a pulmonary embolus (PE) and can cause serious problems with breathing which can lead to death.  Am I at risk for DVT/PE?  If you are not very active, you are at risk of DVT.  Anyone confined to bed, sitting for long periods of time,  recovering from surgery, etc. increases the risk of DVT.  Other risk factors are cancer diagnosis, certain medications, estrogen replacement in any form,older age, obesity, pregnancy, smoking, history of clotting disorders, and dehydration.  How will I know if I have a DVT?  Swelling in the lower leg  Pain  Warmth, redness, hardness or bulging of the vein  If you have any of these symptoms, call your doctors office right away.  Some people will not have any symptoms until the clot moves to the lungs.  What are the symptoms of a PE?  Panting, shortness of breath, or trouble breathing  Sharp, knife-like chest pain when you breathe  Coughing or coughing up blood  Rapid heartbeat  If you have any of these symptoms or get worse quickly, call 911 for emergency treatment.  How can I prevent a DVT?  Avoid long periods of inactivity and dont cross your legs--get up and walk around every hour or so.  Stay active--walking after surgery is highly encouraged.  This means you should get out of the house and walk in the neighborhood.  Going up and down stairs will not impair healing (unless advised against such activity by your doctor).    Drink plenty of noncaffeinated, nonalcoholic fluids each day to prevent dehydration.  Wear special support stockings, if they have been advised by your doctor.  If you travel, stop at least once an hour and walk around.  Avoid smoking (assistance with stopping is available through your healthcare provider)  Always notify your doctor if you are not able to follow the post operative instructions that are given to you at the time of discharge.  It may be necessary to prescribe one of the medications available to prevent DVT.

## 2024-04-04 NOTE — ANESTHESIA PROCEDURE NOTES
Intubation    Date/Time: 4/4/2024 10:44 AM    Performed by: Janeth Ball CRNA  Authorized by: Lg Segura MD    Intubation:     Induction:  Intravenous    Intubated:  Postinduction    Mask Ventilation:  Not attempted    Attempts:  1    Attempted By:  CRNA    Difficult Airway Encountered?: No      Complications:  None    Airway Device:  Supraglottic airway/LMA    Airway Device Size:  4.0    Style/Cuff Inflation:  Cuffed (inflated to minimal occlusive pressure)    Secured at:  The lips    Placement Verified By:  Capnometry    Complicating Factors:  None    Findings Post-Intubation:  Atraumatic/condition of teeth unchanged and BS equal bilateral

## 2024-04-04 NOTE — ANESTHESIA PREPROCEDURE EVALUATION
04/04/2024  Giovanni Zavaleta  is a 83 y.o., male.      Pre-op Assessment    I have reviewed the Patient Summary Reports.     I have reviewed the Nursing Notes. I have reviewed the NPO Status.   I have reviewed the Medications.     Review of Systems  Anesthesia Hx:  No problems with previous Anesthesia                Social:  Former Smoker       Cardiovascular:     Hypertension          PVD                        Hypertension         Renal/:  Chronic Renal Disease, CKD        Kidney Function/Disease             Musculoskeletal:  Arthritis        Arthritis          Neurological:  Neurology Normal              Arthritis                               Physical Exam  General: Well nourished    Airway:  Mallampati: II   Mouth Opening: Normal  TM Distance: Normal  Neck ROM: Normal ROM        Anesthesia Plan  Type of Anesthesia, risks & benefits discussed:    Anesthesia Type: Gen Supraglottic Airway  Intra-op Monitoring Plan: Standard ASA Monitors  Post Op Pain Control Plan: multimodal analgesia  Induction:  IV  Informed Consent: Informed consent signed with the Patient and all parties understand the risks and agree with anesthesia plan.  All questions answered.   ASA Score: 2    Ready For Surgery From Anesthesia Perspective.     .

## 2024-04-04 NOTE — OP NOTE
[facility] Operative/Brief Discharge Note     Date: 04/4/2024     Staff Surgeon: Itz Toledo MD     Pre-Op Diagnosis:   BPH with obstruction/LUTS                   Post-Op Diagnosis: same     Procedure(s) Performed:   Urolift: cystoscopy with prostatic urethral lift/transprostatic tissue retraction (69349) with placement of 4 total implants (52442 x3)     Specimen(s):  none     Anesthesia: General LMA anesthesia     Findings:    Asymmetric lateral lobe ingrowth right > left in short prostatic urethra of which asymmetric placement of 3 right and 1 left implant (stacked on right) resolved obstruction. Minimal anterior bleeding fulgurated      Estimated Blood Loss: minimal     Drains: 22 fr alfred       Complications: none     Indications for procedure:   83-year-old man with long history BPH/LUTS, managed with dual medical therapy and increasing dose of alpha blocker at flomax 0.8mg, and has had with progressive symptom worsening and interim UTI  and he elected to proceed with UroLift, especially given minimally invasive nature to minimize treatment side effects. All risks and benefits discussed and appropriate informed consent obtained.     Procedure in detail:  After appropriate informed consent was obtained, the patient was taken to the cystoscopy suite in the operating room.  Preoperative antibiotics were given and a WHO proved timeout was performed.      20fr UroLift scope was passed per urethra and confirmed previous cystoscopic findings noted above.  Bilateral ureteral orifices were in their orthotopic position on the trigone bilaterally and the bladder otherwise appeared normal with moderate trabeculations/cellules.     Cystoscopy bridge was then replaced with a urolift delivery device.  The first treatment site was at level of verumontanum in anterior channel given short prostatic urethra The distal tip of the delivery device was then angled laterally, approximately 10° at this position, to compress  the lateral lobe. The trigger was pulled to deploy a needle containing the implant through the capsule of the prostate, and additional 10°. The needle was then retracted allowing the implants to be delivered to the capsular surface of the prostate which was then tensioned to a short capsular tensioning and removal of the slack monofilament.  The device was then angled back towards midline and slowly advanced proximally about 3-4 mm until cystoscopic verification that the monofilament was centered in the delivery bay of the device.  Excess filament was then severed with suture cut maneuver of device. The delivery device was then readvanced into the bladder, and leaving the cystoscopic sheath in place, and implant cartridge removed and replaced with a second Urolift implant cartrdige, for which contralateral implant placed in similar fashion.       After the initial 2 implants were placed, great retraction on left but asymmetric tissue on right had 2 additional implants, stacked anterior and stacked vertically, and then obstruction relieved.  Bladder neck then remained open with flow of irrigation off, and view with visual obturator from the verumontanum noted a smooth continuous channel from verumontanum to bladder neck  There was mild anterior prostatic urethral bleeding and near bladder neck.     The Urolift scope was then exchanged for a rigid cystoscope for which Bugbee monopolar cautery was also used then spot prostatic urethral fulguration of the anterior prostatic urethra bleeding.    A 22 [place] Baca with 10cc in 10 cc balloon was placed and irrigated with 60 cc catheter tip syringe noting urine to be clear, and then placed to statlock.  He tolerated procedure well without complication and was awakened and taken to PACU without incident.       Disposition:   He will be observed in PACU to ensure urine remains reasonably clear and does not require irrigation as he hydrates and meets pacu criteria, then will  be discharged home. Prophylactic postoperative antibiotics, and urinary analgesic/anti-spasmodic to help control any postoperative irritative or inflammatory symptoms.  Will remove alfred at home on Fri 4/5 , and He will return in 1 month for reeval with NP with repeat symptom assessment and measurement of postvoid residual.    Discharge:  Diet - resume home diet  Follow up: 1 month NP  Instructions:  Avoid strenuous activity until 3-5 days after alfred removed  No riding mowers, bicycles, motorcycles, tractors, or sexual activity for 2-3 weeks  Hold plavix 3 days - restart Monday am  No fish oil/aspirin/Althea or supplements x 5 days  OTC meds for discomfort such as ibuprofen, advil, tylenol OK  Pink/clear red (koolaid) urine ok as long as clear/translucent without dark blood or clots, and urinating well/draining well without difficulty - drink lots of water.  WATER WATER WATER!!  May see blood drip around alfred, is ok and normal within first few days  Avoid constipation, pushing/straining with BM. Use stool softener if needed  Will have increased urgency and frequency after removing catheter so avoid/minimize bladder irritants (coffee, soda, tea, alcohol)  *MUST increase water  As well see all recs for urgency ferquency below as urge incontinence (needing to go so bad you start urinating before you get to a toilet) can happen up front  Use pyridium/phenazopyridine once alfred removed if burning persists after alfred removal (will turn urine orange)  Complete antibiotics   ** has ceftin 250 daily at home -- take 500mg (2 capsules) TWICE daily (AM and PM, start tonight) x 5 days.   - any leftover after, continue daily until Rx is complete  Remove Alfred catheter as directed with 10 syringe provided FRIDAY morning 4/5/24 between 8 and 9:00 a.m.  If unable to void by 6 hours later or other concerns, present to clinic for eval  While catheter is in, use leg bag when ambulatory and large bag at night.   Lubricate tip of penis  around catheter as needed with plain KY or plain vaseline to avoid sticking  Catheter will move with you so may appear to be going in/out but it is not and the clip on the leg helps keep it from pulling tension.  Ok to shower with catheter in place. All catheter and bags can get wet. Towel dry. Use soap and water gently at end of penis and around catheter to keep clean. No soaks/baths until alfred removed.   Urolift implants are mri safe - will get safety card - likewise will not set off any metal detectors, including airport  Meds:     Medication List        START taking these medications      phenazopyridine 200 MG tablet  Commonly known as: PYRIDIUM  Take 1 tablet (200 mg total) by mouth 3 (three) times daily as needed (dysuria).            CONTINUE taking these medications      ascorbic acid (vitamin C) 100 MG tablet  Commonly known as: VITAMIN C     busPIRone 7.5 MG tablet  Commonly known as: BUSPAR     cefUROXime 250 MG tablet  Commonly known as: CEFTIN  Take 1 tablet (250 mg total) by mouth once daily.     ciclopirox 8 % Soln  Commonly known as: PENLAC     clopidogreL 75 mg tablet  Commonly known as: PLAVIX     clotrimazole-betamethasone 1-0.05% cream  Commonly known as: LOTRISONE  Apply topically 2 (two) times daily. To groin creases x3-4 weeks max or until resolution     coenzyme Q10 100 mg capsule     fenofibrate micronized 134 MG Cap  Commonly known as: LOFIBRA  Take 1 capsule (134 mg total) by mouth daily with breakfast.     finasteride 5 mg tablet  Commonly known as: PROSCAR  TAKE 1 TABLET EVERY DAY     fluticasone propionate 50 mcg/actuation nasal spray  Commonly known as: FLONASE  2 sprays (100 mcg total) by Each Nostril route once daily at 6am.     hydrocortisone 1 % lotion     ketorolac 0.5% 0.5 % Drop  Commonly known as: ACULAR     labetaloL 200 MG tablet  Commonly known as: NORMODYNE  TAKE 1 TABLET EVERY DAY     LORazepam 1 MG tablet  Commonly known as: ATIVAN     melatonin 3 mg Cap     OCUVITE  WITH LUTEIN Tab  Generic drug: vit A,C & E-lutein-minerals 1,000-60-2 unit     rosuvastatin 10 MG tablet  Commonly known as: CRESTOR  TAKE 1 TABLET EVERY EVENING     tamsulosin 0.4 mg Cap  Commonly known as: FLOMAX  Take 2 capsules (0.8 mg total) by mouth once daily. Take in evening.     triamcinolone acetonide 0.1% 0.1 % cream  Commonly known as: KENALOG     vitamin D 1000 units Tab  Commonly known as: VITAMIN D3               Where to Get Your Medications        These medications were sent to [facility]/pharmacy #[id number] - [place], [place] - [address] AT [facility]  [address], [place] [address]      Phone: [phone]   phenazopyridine 200 MG tablet

## 2024-04-08 ENCOUNTER — TELEPHONE (OUTPATIENT)
Dept: UROLOGY | Facility: CLINIC | Age: 84
End: 2024-04-08
Payer: MEDICARE

## 2024-04-08 NOTE — TELEPHONE ENCOUNTER
Spoke w pts wife who called voicing possible retention and asked that pt come in for bladder scan as she has concern pt does not have a lot of output.  She voiced that pt has only been drinking 16 oz   I did encourage for more hydration as 16 oz is not enough   Pts wife is concerned with low volume   Pt was scheduled for pvr tomm afternoon per wifes recommendation as the only time they have transportation

## 2024-04-08 NOTE — TELEPHONE ENCOUNTER
----- Message from Ayala Chapman sent at 4/8/2024 12:46 PM CDT -----  Type: Needs Medical Advice  Who Called:  pt wife  Best Call Back Number: [phone]    Additional Information: Pt wife is calling the office asking to speak with the nurse pt is having issues after surgery.Please call back and advise.

## 2024-04-09 ENCOUNTER — CLINICAL SUPPORT (OUTPATIENT)
Dept: UROLOGY | Facility: CLINIC | Age: 84
End: 2024-04-09
Payer: MEDICARE

## 2024-04-09 DIAGNOSIS — N40.1 BENIGN PROSTATIC HYPERPLASIA WITH INCOMPLETE BLADDER EMPTYING: Primary | ICD-10-CM

## 2024-04-09 DIAGNOSIS — R39.14 BENIGN PROSTATIC HYPERPLASIA WITH INCOMPLETE BLADDER EMPTYING: Primary | ICD-10-CM

## 2024-04-09 LAB — POC RESIDUAL URINE VOLUME: 133 ML (ref 0–100)

## 2024-04-09 PROCEDURE — 51798 US URINE CAPACITY MEASURE: CPT | Mod: S$GLB,,, | Performed by: UROLOGY

## 2024-04-09 PROCEDURE — 99499 UNLISTED E&M SERVICE: CPT | Mod: S$GLB,,, | Performed by: UROLOGY

## 2024-04-09 NOTE — PROGRESS NOTES
Patient presented to office for pvr after recent procedure of urolift  Pvr showed 133 ml    Reviewed conservative measures for urgency frequency   1. Avoiding/minimizing bladder irritants (see below), especially in afternoon and evening hours  Discussed bladder irritants include coffe (even decaf), tea, alcohol, soda, spicy foods, acidic juices (orange, tomato), vinegar, and artificial sweeteners/sugary beverages.  2. timed voiding - empty on a schedule (approx ~2-3 hours) in spite of need to urinate, to get ahead of urge  3. dont postponing voiding - dont hold it on purpose   4. bowel regimen as distended bowel has extrinsic compressive effect on bladder.   - any or all of the following in any combination, titrate to soft daily bowel movement without pushing or straining  - colace/stool softener capsule - once to twice daily  - miralax - 1 capful daily to start, can increase to 2x daily (or decrease to 1/2 cap daily)  - increase dietary fibery  - fiber supplements, such as metamucil  - prunes, prune juice  5. INCREASE water intake  6. Stop fluids 2 hours before bed, and urinate just before bed      Discussed Medications-pt is still on Flomax and pyridium informed to discontinue  Patients daughter in law was present and voiced understanding to all recs

## 2024-04-10 NOTE — TELEPHONE ENCOUNTER
NV reviewed  Pvr stable  As discussed extensively, and day of surgery, hyrdation is of utmost importance.  Output likeley low bc intake low  Should assist pt in efforts to hydrate and follow all DC recs  As well, should review conservative recs for urgency/frequency (if has mychart can send again)

## 2024-04-13 LAB
OHS QRS DURATION: 116 MS
OHS QTC CALCULATION: 432 MS

## 2024-05-01 ENCOUNTER — OFFICE VISIT (OUTPATIENT)
Dept: UROLOGY | Facility: CLINIC | Age: 84
End: 2024-05-01
Payer: MEDICARE

## 2024-05-01 DIAGNOSIS — N40.1 BENIGN PROSTATIC HYPERPLASIA WITH LOWER URINARY TRACT SYMPTOMS, SYMPTOM DETAILS UNSPECIFIED: Primary | ICD-10-CM

## 2024-05-01 LAB — POC RESIDUAL URINE VOLUME: 54 ML (ref 0–100)

## 2024-05-01 PROCEDURE — 3288F FALL RISK ASSESSMENT DOCD: CPT | Mod: CPTII,S$GLB,, | Performed by: NURSE PRACTITIONER

## 2024-05-01 PROCEDURE — 1160F RVW MEDS BY RX/DR IN RCRD: CPT | Mod: CPTII,S$GLB,, | Performed by: NURSE PRACTITIONER

## 2024-05-01 PROCEDURE — 1101F PT FALLS ASSESS-DOCD LE1/YR: CPT | Mod: CPTII,S$GLB,, | Performed by: NURSE PRACTITIONER

## 2024-05-01 PROCEDURE — 99999 PR PBB SHADOW E&M-EST. PATIENT-LVL III: CPT | Mod: PBBFAC,,, | Performed by: NURSE PRACTITIONER

## 2024-05-01 PROCEDURE — 1159F MED LIST DOCD IN RCRD: CPT | Mod: CPTII,S$GLB,, | Performed by: NURSE PRACTITIONER

## 2024-05-01 PROCEDURE — 99214 OFFICE O/P EST MOD 30 MIN: CPT | Mod: S$GLB,,, | Performed by: NURSE PRACTITIONER

## 2024-05-01 PROCEDURE — 51798 US URINE CAPACITY MEASURE: CPT | Mod: S$GLB,,, | Performed by: NURSE PRACTITIONER

## 2024-05-01 NOTE — PROGRESS NOTES
[facility] Clinic Note  Staff: CADE Philippe-C    PCP: ANAIS Hebert  Urologist:  ANAIS Toledo    Chief Complaint: S/P Urolift    Subjective:        HPI: Giovanni Zavaleta Sr. is a 83 y.o. male presents today for f/up S/P Urolift procedure performed by Dr. Toledo on 24.  He is accompanied by wife during visit today.     HX:  83-year-old man with long history BPH/LUTS, managed with dual medical therapy and increasing dose of alpha blocker at flomax 0.8mg, and has had with progressive symptom worsening and interim UTI  and he elected to proceed with UroLift, especially given minimally invasive nature to minimize treatment side effects. All risks and benefits discussed and appropriate informed consent obtained.     Procedure(s) Performed by Dr. Toledo on 24:  Urolift: cystoscopy with prostatic urethral lift/transprostatic tissue retraction (18877) with placement of 4 total implants (52442 x3)      Findings:    Asymmetric lateral lobe ingrowth right > left in short prostatic urethra of which asymmetric placement of 3 right and 1 left implant (stacked on right) resolved obstruction. Minimal anterior bleeding fulgurated    OV 24:  UA showed--Pt attempted to urinate with no success on arrival  PVR by bladder scan is 54 mL*  Pt denies dysuria and gross hematuria today.  He is now off the Finasteride and Flomax for the past 2-3 weeks.  Pt states there are changes to his LUTS since procedure including:  Nocturia 3-4x nightly, urinary frequency, and weak/weird stream not like before.    AUA SS Today:  26/5 Unhappy  Feeling of ICBE: 2  Frequency:3  Intermittency:4  Urgency:4  Weak urine stream:5  Strainin  Nocturia:4    REVIEW OF SYSTEMS:  A comprehensive 10 system review was performed and is negative except as noted above in HPI    PMHx:  Past Medical History:   Diagnosis Date    CKD (chronic kidney disease) stage 3, GFR 30-59 ml/min     Diverticulosis     DJD (degenerative joint disease)      HLD (hyperlipidemia)     Hypertension     Macular degeneration     PVD (peripheral vascular disease)      PSHx:  Past Surgical History:   Procedure Laterality Date    ARTERIAL BYPASS SURGRY  2009,2010,2012    Dr Abhishek Lantigua; Peripheral arteries    CAROTID ENDARTERECTOMY Bilateral      5 and 6 years ago.    CHOLECYSTECTOMY      COLON SURGERY  2012    10 inches removed     CYSTOSCOPY N/A 2/6/2024    Procedure: CYSTOSCOPY;  Surgeon: Itz Toledo MD;  Location: [facility] OR;  Service: Urology;  Laterality: N/A;    CYSTOSCOPY WITH INSERTION OF MINIMALLY INVASIVE IMPLANT TO ENLARGE PROSTATIC URETHRA N/A 4/4/2024    Procedure: CYSTOSCOPY, WITH INSERTION OF UROLIFT IMPLANT;  Surgeon: Itz Toledo MD;  Location: [facility] OR;  Service: Urology;  Laterality: N/A;    TONSILLECTOMY      at 19 y/o     TRANSRECTAL ULTRASOUND EXAMINATION N/A 2/6/2024    Procedure: ULTRASOUND, RECTAL APPROACH;  Surgeon: Itz Toledo MD;  Location: [facility] OR;  Service: Urology;  Laterality: N/A;     Allergies:  Pravastatin    Medications: reviewed     Objective:   There were no vitals filed for this visit.    General:WDWN in NAD  Eyes: PERRLA, normal conjunctiva  Respiratory: no increased work on breathing, clear to auscultation  Cardiovascular: regular rate and rhythm. No obvious extremity edema.  GI: palpation of masses. No tenderness. No hepatosplenomegaly to palpation.  Musculoskeletal: normal range of motion of bilateral upper extremities. Normal muscle strength and tone.  Skin: no obvious rashes or lesions. No tightening of skin noted.  Neurologic: CN grossly normal. Normal sensation.   Psychiatric: awake, alert and oriented x 3. Mood and affect normal. Cooperative.    Assessment:       1. Benign prostatic hyperplasia with lower urinary tract symptoms, symptom details unspecified          Plan:   S/P Urolift    Overall pt is 4 weeks postop procedure at this time.  He is not on any  meds at this time.  Very bothered  regarding his new change in urine stream since the procedure.  However I did thoroughly review with pt and wife during ov today that we can see changes and improvement in LUTS up to at least 6 mos after procedure.  Pt and family verbalized understanding at this time.     F/u--RTC in 3 months for recheck of LUTS at that time with UA and PVR scan, VANDANA SS.  If new issues arise prior to next ov, then please contact our office.       Amie Olvera, FNP-C

## 2024-09-03 ENCOUNTER — OFFICE VISIT (OUTPATIENT)
Dept: UROLOGY | Facility: CLINIC | Age: 84
End: 2024-09-03
Payer: MEDICARE

## 2024-09-03 VITALS
WEIGHT: 172 LBS | HEIGHT: 67 IN | DIASTOLIC BLOOD PRESSURE: 57 MMHG | HEART RATE: 124 BPM | SYSTOLIC BLOOD PRESSURE: 91 MMHG | BODY MASS INDEX: 27 KG/M2

## 2024-09-03 DIAGNOSIS — N40.1 BPH WITH OBSTRUCTION/LOWER URINARY TRACT SYMPTOMS: ICD-10-CM

## 2024-09-03 DIAGNOSIS — N13.8 BPH WITH OBSTRUCTION/LOWER URINARY TRACT SYMPTOMS: ICD-10-CM

## 2024-09-03 DIAGNOSIS — R39.15 URINARY URGENCY: Primary | ICD-10-CM

## 2024-09-03 PROCEDURE — 3074F SYST BP LT 130 MM HG: CPT | Mod: CPTII,S$GLB,, | Performed by: UROLOGY

## 2024-09-03 PROCEDURE — 1126F AMNT PAIN NOTED NONE PRSNT: CPT | Mod: CPTII,S$GLB,, | Performed by: UROLOGY

## 2024-09-03 PROCEDURE — 3078F DIAST BP <80 MM HG: CPT | Mod: CPTII,S$GLB,, | Performed by: UROLOGY

## 2024-09-03 PROCEDURE — G2211 COMPLEX E/M VISIT ADD ON: HCPCS | Mod: S$GLB,,, | Performed by: UROLOGY

## 2024-09-03 PROCEDURE — 3288F FALL RISK ASSESSMENT DOCD: CPT | Mod: CPTII,S$GLB,, | Performed by: UROLOGY

## 2024-09-03 PROCEDURE — 1101F PT FALLS ASSESS-DOCD LE1/YR: CPT | Mod: CPTII,S$GLB,, | Performed by: UROLOGY

## 2024-09-03 PROCEDURE — 99999 PR PBB SHADOW E&M-EST. PATIENT-LVL III: CPT | Mod: PBBFAC,,, | Performed by: UROLOGY

## 2024-09-03 PROCEDURE — 51798 US URINE CAPACITY MEASURE: CPT | Mod: S$GLB,,, | Performed by: UROLOGY

## 2024-09-03 PROCEDURE — 99214 OFFICE O/P EST MOD 30 MIN: CPT | Mod: S$GLB,,, | Performed by: UROLOGY

## 2024-09-03 RX ORDER — MIRABEGRON 50 MG/1
50 TABLET, EXTENDED RELEASE ORAL DAILY
Qty: 30 TABLET | Refills: 11 | Status: SHIPPED | OUTPATIENT
Start: 2024-09-03 | End: 2025-09-03

## 2024-09-03 NOTE — PROGRESS NOTES
[facility] Urology Progress Note    Giovanni Zavaleta Sr. is a 83 y.o. male who presents for BPH follow up    82yo M with long history of BPH/LUTS my previously worked up in 2018 for refractory symptoms as despite dual medical therapy with Flomax and finasteride still had severe symptoms and urgency, and was found to have a 35 g prostate with short prostatic urethra but significant lateral lobe obstruction, and discussed minimally invasive BPH intervention at that time given the refractory nature of his symptoms and incomplete emptying.  However at that time he elected to increased dose of alpha blocker for maximum medical therapy and decrease his coffee intake.  On follow-up, irritative symptoms such as urgency and frequency progressed and he still preferred adding overactive bladder agent rather than intervention.  Has been seen in follow-up many times with medical management changes and changes in his anticholinergic or OAB medication, however this has to be stopped secondary to elevated postvoid residuals, and recurrent urinary tract infections within the last 1-2 years.  This is correlated with his incomplete emptying, and since discontinuing VESIcare his PVRs are in half but still greater than 100 cc   followed by Dr. Botello for peripheral vascular disease including bilateral carotid stenosis and CEA, and is chronically anticoagulated with Plavix   Recurrent ecoli utis starting 2/23, pvr 212cc 12/23  Lower tract eval 2018 with obst and elected flomax/finsat/mirabegron and return for UFS.PVR and consider urolift vs turp - urolift obstructed    4/4/24 urolift - Asymmetric lateral lobe ingrowth right > left in short prostatic urethra of which asymmetric placement of 3 right and 1 left implant (stacked on right) resolved obstruction     5/1/24: NP fu noted UA showed--Pt attempted to urinate with no success on arrival  PVR by bladder scan is 54 mL*  Pt denies dysuria and gross hematuria today.  He is now off the  "Finasteride and Flomax for the past 2-3 weeks.  Pt states there are changes to his LUTS since procedure including:  Nocturia 3-4x nightly, urinary frequency, and weak/weird stream not like before.  AUA SS Today:  26/5 Unhappy (Feeling of ICBE: 2, Frequency:3, Intermittency:4, Urgency:4, Weak urine stream:5, Strainin, Nocturia:4)     He returns today with wife noting  AUA SS: 23/4 (4:  Intermittency, urgency, nocturia; 3: Emptying, weak stream, straining; 2: Frequency)  Voided prior to appointment.  PVR 0 cc.  No interim UTIs.  Stopped finasteride time of cystoscopic finding of 25 g gland, previously only 35 years prior.  Off Flomax since UroLift.  He is emptying now.  He is no longer urinating all over the place.  He denies any foul-smelling urine or interim infection.  Occasional urge incontinence, but is much better than preop  In wheelchair.  Mobility is the issue.  Getting to the bathroom when he needs to go is more about his mobility.  Memory significantly declining.    Focused Physical Exam:    Vitals:    24 1057   BP: (!) 91/57   Pulse: (!) 124     Body mass index is 26.94 kg/m². Weight: 78 kg (172 lb) Height: 5' 7" (170.2 cm)     Abdomen: Soft, non-tender, nondistended, no CVA tenderness    Recent Results (from the past 336 hour(s))   POCT Bladder Scan    Collection Time: 24 11:00 AM   Result Value Ref Range    POC Residual Urine Volume 0 0 - 100 mL       ASSESSMENT   1. Urinary urgency  mirabegron (MYRBETRIQ) 50 mg Tb24      2. BPH with obstruction/lower urinary tract symptoms  POCT Bladder Scan          Plan    Extensively reviewed interim medical record and follow up evaluation by nurse practitioner since BPH intervention.  Overall much improved despite symptom score with severe symptoms, subjectively much better.  Emptying better, previously with PVR of 200 cc, now emptying completely.  No interim urinary tract infections.  We discuss the primary goal of intervention was for relief of " obstruction given his recurrent urinary tract infections, which have not recurred.  As well, his emptying is improved.  Subjectively his symptoms have improved as well, urgency is much better, he is no longer urinating all over the place.  Does seem to have better urinary quality of life.  Provided urinal secondary to his mobility issues.  Also discussed with patient and wife, primarily the latter, the behavioral component of voiding, and the toll on urinary habits and inconsistencies with worsening dementia.  As there is some urgency and occasional urge incontinence, though primarily this is ability, was present previously, was previously on OAB agent.  These were stopped whenn his PVR wass high, but as he is emptying well at this time, it is reasonable to start Myrbetriq again at this time.  Would not use anticholinergics secondary to age and comorbidities and decline in memory.  No obstructive symptoms so no need to restart alpha blockers.      If any emptying concerns or recurrent UTI when resuming OAB agent, would then discontinue it, and follow him without medication again, as currently on no meds he is doing relatively well, but can work medically to control some of his urgency frequency as long as it was safe to do so ,Rx sent to pharmacy  RTC 6 mos NP    Level of Medical Decision Making  [ _ ]  Low: 2 minor/1 stable chronic/1 acute uncomplicated --- review record/result/order test x2  [ X ]  Mod: 1 chronic exac/2stable chronic/1 acute comp --- review record/result/order test x3 OR independent interp of outside test OR discuss mgmt of outside ordered test --- start drug therapy/plan minor surgery   [ _ ]  High: chronic with exacerbation/progression or trtmnt side effect vs acute/chronic w/ life/body threat ---  (2/3) review record/result/order test x3 OR independent interp of ouutside test OR discuss mgmt of outside ordered test --- drug with monitoring for toxicity, plan major surgery, hospitalize,  deescalate/withdraw care bc of prognosis    *Visit today included increased complexity associated with the current or anticipated ongoing medical longitudinal care and management related to this patient's serious and/or complex managed problem(s) as described above.

## 2024-09-08 LAB — POC RESIDUAL URINE VOLUME: 0 ML (ref 0–100)

## 2025-03-05 ENCOUNTER — TELEPHONE (OUTPATIENT)
Dept: PODIATRY | Facility: CLINIC | Age: 85
End: 2025-03-05
Payer: MEDICARE

## 2025-03-05 NOTE — TELEPHONE ENCOUNTER
----- Message from Melaon sent at 3/5/2025 12:20 PM CST -----  Type:  Appointment RequestCaller is requesting a  appointment.  Name of Caller:PTS WIFE, Dav is the first available appointment?none seenSymptoms:nail care/ new patientWould the patient rather a call back or a response via [facility]? Call [facility] Call Back Number:[phone] Information: pls let her know if you can see her  as a new patient.   Please call back to advise. Thanks!

## 2025-03-06 ENCOUNTER — OFFICE VISIT (OUTPATIENT)
Dept: UROLOGY | Facility: CLINIC | Age: 85
End: 2025-03-06
Payer: MEDICARE

## 2025-03-06 DIAGNOSIS — N40.1 BPH WITH OBSTRUCTION/LOWER URINARY TRACT SYMPTOMS: ICD-10-CM

## 2025-03-06 DIAGNOSIS — N13.8 BPH WITH OBSTRUCTION/LOWER URINARY TRACT SYMPTOMS: ICD-10-CM

## 2025-03-06 DIAGNOSIS — R39.15 URINARY URGENCY: Primary | ICD-10-CM

## 2025-03-06 PROCEDURE — G2211 COMPLEX E/M VISIT ADD ON: HCPCS | Mod: S$GLB,,, | Performed by: NURSE PRACTITIONER

## 2025-03-06 PROCEDURE — 99214 OFFICE O/P EST MOD 30 MIN: CPT | Mod: S$GLB,,, | Performed by: NURSE PRACTITIONER

## 2025-03-06 PROCEDURE — 51798 US URINE CAPACITY MEASURE: CPT | Mod: S$GLB,,, | Performed by: NURSE PRACTITIONER

## 2025-03-06 PROCEDURE — 1126F AMNT PAIN NOTED NONE PRSNT: CPT | Mod: CPTII,S$GLB,, | Performed by: NURSE PRACTITIONER

## 2025-03-06 PROCEDURE — 99999 PR PBB SHADOW E&M-EST. PATIENT-LVL IV: CPT | Mod: PBBFAC,,, | Performed by: NURSE PRACTITIONER

## 2025-03-06 PROCEDURE — 1159F MED LIST DOCD IN RCRD: CPT | Mod: CPTII,S$GLB,, | Performed by: NURSE PRACTITIONER

## 2025-03-06 RX ORDER — SOLIFENACIN SUCCINATE 5 MG/1
5 TABLET, FILM COATED ORAL DAILY
Qty: 30 TABLET | Refills: 12 | Status: SHIPPED | OUTPATIENT
Start: 2025-03-06 | End: 2026-03-06

## 2025-03-06 NOTE — PROGRESS NOTES
[facility] Note  Staff: CADE Philippe-C    PCP: MD Anup  :  MD Paris    Chief Complaint: BPH F/UP    Subjective:        HPI: Giovanni Zavaleta Sr. is a 84 y.o. male presents today for 6-Month BPH F/UP.    Pt is an Established pt of Dr. Toledo.  Last OV with MD was 9/3/2024.  84yo M with long history of BPH/LUTS my previously worked up in 2018 for refractory symptoms as despite dual medical therapy with Flomax and finasteride still had severe symptoms and urgency, and was found to have a 35 g prostate with short prostatic urethra but significant lateral lobe obstruction, and discussed minimally invasive BPH intervention at that time given the refractory nature of his symptoms and incomplete emptying.  However at that time he elected to increased dose of alpha blocker for maximum medical therapy and decrease his coffee intake.  On follow-up, irritative symptoms such as urgency and frequency progressed and he still preferred adding overactive bladder agent rather than intervention.  Has been seen in follow-up many times with medical management changes and changes in his anticholinergic or OAB medication, however this has to be stopped secondary to elevated postvoid residuals, and recurrent urinary tract infections within the last 1-2 years.  This is correlated with his incomplete emptying, and since discontinuing VESIcare his PVRs are in half but still greater than 100 cc   followed by Dr. Botello for peripheral vascular disease including bilateral carotid stenosis and CEA, and is chronically anticoagulated with Plavix   Recurrent ecoli utis starting 2/23, pvr 212cc 12/23  Lower tract eval 2018 with obst and elected flomax/finsat/mirabegron and return for UFS.PVR and consider urolift vs turp - urolift obstructed     4/4/24 urolift - Asymmetric lateral lobe ingrowth right > left in short prostatic urethra of which asymmetric placement of 3 right and 1 left implant (stacked on right)  resolved obstruction      24: NP fu noted UA showed--Pt attempted to urinate with no success on arrival  PVR by bladder scan is 54 mL*  Pt denies dysuria and gross hematuria today.  He is now off the Finasteride and Flomax for the past 2-3 weeks.  Pt states there are changes to his LUTS since procedure including:  Nocturia 3-4x nightly, urinary frequency, and weak/weird stream not like before.  AUA SS Today:  26/5 Unhappy (Feeling of ICBE: 2, Frequency:3, Intermittency:4, Urgency:4, Weak urine stream:5, Strainin, Nocturia:4)    9/3/24 w/ MD:  He returns today with wife noting  AUA SS: 23/4 (4:  Intermittency, urgency, nocturia; 3: Emptying, weak stream, straining; 2: Frequency)  Voided prior to appointment.  PVR 0 cc.  No interim UTIs.  Stopped finasteride time of cystoscopic finding of 25 g gland, previously only 35 years prior.  Off Flomax since UroLift.  He is emptying now.  He is no longer urinating all over the place.  He denies any foul-smelling urine or interim infection.  Occasional urge incontinence, but is much better than preop  In wheelchair.  Mobility is the issue.  Getting to the bathroom when he needs to go is more about his mobility.  Memory significantly declining.    3/6/25-NP O'Anahy:  Pt and spouse RTC for f/up today.  After last OV with Dr. Toledo, pt was started on Myrbetriq 50 mg po daily for his urinary frequency and incontinence.  Pt states today in clinic the medication is no longer affordable with his insurance.  Therefore he is not taking the Myrbetriq at this time.  Remains with intermittent urinary incontinence and frequency.  No dysuria  No gross hematuria noted by pt.  UA upon arrival-unable to urinate, in wc  PVR is 0 mL    REVIEW OF SYSTEMS:  A comprehensive 10 system review was performed and is negative except as noted above in HPI    PMHx:  Past Medical History:   Diagnosis Date    CKD (chronic kidney disease) stage 3, GFR 30-59 ml/min     Diverticulosis     DJD  (degenerative joint disease)     HLD (hyperlipidemia)     Hypertension     Macular degeneration     PVD (peripheral vascular disease)        PSHx:  Past Surgical History:   Procedure Laterality Date    ARTERIAL BYPASS SURGRY  2009,2010,2012    Dr Abhishek Lantigua; Peripheral arteries    CAROTID ENDARTERECTOMY Bilateral      5 and 6 years ago.    CHOLECYSTECTOMY      COLON SURGERY  2012    10 inches removed     CYSTOSCOPY N/A 2/6/2024    Procedure: CYSTOSCOPY;  Surgeon: Itz Toledo MD;  Location: [facility];  Service: Urology;  Laterality: N/A;    CYSTOSCOPY WITH INSERTION OF MINIMALLY INVASIVE IMPLANT TO ENLARGE PROSTATIC URETHRA N/A 4/4/2024    Procedure: CYSTOSCOPY, WITH INSERTION OF UROLIFT IMPLANT;  Surgeon: Itz Toledo MD;  Location: [facility] OR;  Service: Urology;  Laterality: N/A;    TONSILLECTOMY      at 19 y/o     TRANSRECTAL ULTRASOUND EXAMINATION N/A 2/6/2024    Procedure: ULTRASOUND, RECTAL APPROACH;  Surgeon: Itz Toledo MD;  Location: [facility];  Service: Urology;  Laterality: N/A;       Allergies:  Pravastatin    Medications: reviewed     Objective:   There were no vitals filed for this visit.    General:WDWN in NAD  Eyes: PERRLA, normal conjunctiva  Respiratory: no increased work on breathing, clear to auscultation  Cardiovascular: regular rate and rhythm. No obvious extremity edema.  GI: palpation of masses. No tenderness. No hepatosplenomegaly to palpation.  Musculoskeletal: normal range of motion of bilateral upper extremities. Normal muscle strength and tone.  Skin: no obvious rashes or lesions. No tightening of skin noted.  Neurologic: CN grossly normal. Normal sensation.   Psychiatric: awake, alert and oriented x 3. Mood and affect normal. Cooperative.    Assessment:       1. Urinary urgency    2. BPH with obstruction/lower urinary tract symptoms          Plan:     Solifenacin 5 mg po daily prescribed to pt today at conclusion of ov for his existing symptoms at this  time.    Also discussed with patient and wife, primarily the latter, the reminder of symptoms and the behavioral component of voiding, and the toll on urinary habits and inconsistencies with worsening dementia.  As there is some urgency and occasional urge incontinence, though primarily this is ability, was present previously, was previously on OAB agent.    Would not use anticholinergics secondary to age and comorbidities and decline in memory.  No obstructive symptoms so no need to restart alpha blockers.      F/u 4-6 months for routine recheck of luts.      Amie Olvera, CADE-JANETTE  Visit today is associated with current or anticipated ongoing medical care related to this patient's single serious condition/complex condition.

## 2025-03-06 NOTE — PATIENT INSTRUCTIONS
WHETHER YOU FEEL AN URGE TO URINATE OR NOT, YOU SHOULD BE FREQUENTING THE TOILET AND ATTEMPT TO URINATE EVERY 3 HOURS!!  NEVER POSTPONE URINATING OR HOLD YOUR URINE.    MAKE SURE YOU ARE HAVING REGULAR/NORMAL BOWEL MOVEMENTS AS THIS ALSO WILL HAVE AN EFFECT ON HOW YOUR BLADDER FUNCTIONS.    NOTHING TO EAT OR DRINK BY MOUTH (THIS INCLUDES WATER) AT LEAST 1-2 HOURS PRIOR TO YOUR BEDTIME ROUTINE.

## 2025-03-07 ENCOUNTER — TELEPHONE (OUTPATIENT)
Dept: PODIATRY | Facility: CLINIC | Age: 85
End: 2025-03-07
Payer: MEDICARE

## 2025-03-10 LAB — POC RESIDUAL URINE VOLUME: 0 ML (ref 0–100)

## 2025-08-20 ENCOUNTER — PATIENT MESSAGE (OUTPATIENT)
Dept: ADMINISTRATIVE | Facility: HOSPITAL | Age: 85
End: 2025-08-20
Payer: MEDICARE

## (undated) DEVICE — GLOVE SURG ULTRA TOUCH 7

## (undated) DEVICE — SEE L#120831

## (undated) DEVICE — SLEEVE SCD EXPRESS KNEE MEDIUM

## (undated) DEVICE — SCRUB DYNA-HEX LIQ 4% CHG 4OZ

## (undated) DEVICE — COVER TRANSDUCER LATEX N/STERI

## (undated) DEVICE — SPONGE BULKEE II ABSRB 6X6.75

## (undated) DEVICE — SPONGE GAUZE 16PLY 4X4

## (undated) DEVICE — SHEET DRAPE MEDIUM

## (undated) DEVICE — GLOVE SENSICARE PI ALOE 7

## (undated) DEVICE — SET CYSTO IRR DRP CHMBR 84IN

## (undated) DEVICE — LEGGING CLEAR POLY 2/PACK

## (undated) DEVICE — DEVICE ANC SW STAT FOLEY 6-24

## (undated) DEVICE — Device

## (undated) DEVICE — BOWL STERILE LARGE 32OZ

## (undated) DEVICE — SOL NACL IRR 1000ML BTL

## (undated) DEVICE — GOWN POLY REINF BRTH SLV LG

## (undated) DEVICE — LUBRICANT SURGILUBE 2 OZ

## (undated) DEVICE — SYR 10CC LUER LOCK

## (undated) DEVICE — SET IRR URLGY 2LINE UNIV SPIKE

## (undated) DEVICE — DRAPE UINDERBUT GRAD PCH

## (undated) DEVICE — SYR 50ML CATH TIP

## (undated) DEVICE — JELLY LUBRICATING STERILE 5 GR

## (undated) DEVICE — SEE MEDLINE ITEM 152651

## (undated) DEVICE — JELLY SURGILUBE LUBE TUBE 2OZ

## (undated) DEVICE — WATER STERILE INJ 500ML BAG

## (undated) DEVICE — SCRUB 10% POVIDONE IODINE 4OZ

## (undated) DEVICE — DRAPE MINOR PROCEDURE

## (undated) DEVICE — SOL IRR WATER STRL 3000 ML

## (undated) DEVICE — SET CYSTO IRRIGATION UNIV SPIK

## (undated) DEVICE — BAG LINGEMAN DRAIN UROLOGY

## (undated) DEVICE — BAG DRAIN ANTI REFLUX 2000ML

## (undated) DEVICE — DRAPE CYSTOSCOPY LARGE

## (undated) DEVICE — GUIDE BIOPSY BIPLANAR 18G

## (undated) DEVICE — GOWN POLY REINF BRTH SLV XL

## (undated) DEVICE — COVER TABLE 44X90 STERILE

## (undated) DEVICE — SOL NACL IRR 3000ML